# Patient Record
Sex: FEMALE | Race: WHITE | NOT HISPANIC OR LATINO | Employment: UNEMPLOYED | URBAN - METROPOLITAN AREA
[De-identification: names, ages, dates, MRNs, and addresses within clinical notes are randomized per-mention and may not be internally consistent; named-entity substitution may affect disease eponyms.]

---

## 2017-02-02 ENCOUNTER — TRANSCRIBE ORDERS (OUTPATIENT)
Dept: ADMINISTRATIVE | Facility: HOSPITAL | Age: 62
End: 2017-02-02

## 2017-02-02 DIAGNOSIS — Z01.812 ENCOUNTER FOR PRE-OPERATIVE LABORATORY TESTING: ICD-10-CM

## 2017-02-02 DIAGNOSIS — Z85.3 PERSONAL HISTORY OF MALIGNANT NEOPLASM OF BREAST: Primary | ICD-10-CM

## 2017-02-14 ENCOUNTER — APPOINTMENT (OUTPATIENT)
Dept: LAB | Facility: HOSPITAL | Age: 62
End: 2017-02-14
Payer: COMMERCIAL

## 2017-02-14 DIAGNOSIS — Z01.812 ENCOUNTER FOR PRE-OPERATIVE LABORATORY TESTING: ICD-10-CM

## 2017-02-14 LAB — VENIPUNCTURE: NORMAL

## 2017-02-14 PROCEDURE — 36415 COLL VENOUS BLD VENIPUNCTURE: CPT

## 2017-02-17 ENCOUNTER — HOSPITAL ENCOUNTER (OUTPATIENT)
Dept: RADIOLOGY | Facility: HOSPITAL | Age: 62
Discharge: HOME | End: 2017-02-17
Payer: COMMERCIAL

## 2017-02-17 DIAGNOSIS — Z85.3 PERSONAL HISTORY OF MALIGNANT NEOPLASM OF BREAST: ICD-10-CM

## 2017-02-17 PROCEDURE — C8908 MRI W/O FOL W/CONT, BREAST,: HCPCS

## 2017-02-17 PROCEDURE — 0159T HB CAD BREAST MRI: CPT

## 2017-02-17 PROCEDURE — A9585 GADOBUTROL INJECTION: HCPCS

## 2017-02-17 RX ADMIN — GADOBUTROL 9 ML: 604.72 INJECTION INTRAVENOUS at 11:17

## 2017-09-11 ENCOUNTER — TRANSCRIBE ORDERS (OUTPATIENT)
Dept: ADMINISTRATIVE | Facility: HOSPITAL | Age: 62
End: 2017-09-11

## 2017-09-11 DIAGNOSIS — R10.30 PAIN IN THE GROIN, UNSPECIFIED LATERALITY: Primary | ICD-10-CM

## 2017-09-15 ENCOUNTER — HOSPITAL ENCOUNTER (OUTPATIENT)
Dept: RADIOLOGY | Facility: HOSPITAL | Age: 62
Discharge: HOME | End: 2017-09-15
Payer: COMMERCIAL

## 2017-09-15 DIAGNOSIS — R10.30 PAIN IN THE GROIN, UNSPECIFIED LATERALITY: ICD-10-CM

## 2017-09-15 PROCEDURE — 72197 MRI PELVIS W/O & W/DYE: CPT

## 2017-09-15 PROCEDURE — A9585 GADOBUTROL INJECTION: HCPCS

## 2017-09-15 PROCEDURE — 74183 MRI ABD W/O CNTR FLWD CNTR: CPT

## 2017-09-15 RX ADMIN — GADOBUTROL 9 ML: 604.72 INJECTION INTRAVENOUS at 16:22

## 2017-12-15 ENCOUNTER — GENERIC CONVERSION - ENCOUNTER (OUTPATIENT)
Dept: OTHER | Facility: OTHER | Age: 62
End: 2017-12-15

## 2017-12-15 ENCOUNTER — HOSPITAL ENCOUNTER (OUTPATIENT)
Dept: RADIOLOGY | Facility: HOSPITAL | Age: 62
Discharge: HOME | End: 2017-12-15
Payer: COMMERCIAL

## 2017-12-15 ENCOUNTER — TRANSCRIBE ORDERS (OUTPATIENT)
Dept: ADMINISTRATIVE | Facility: HOSPITAL | Age: 62
End: 2017-12-15

## 2017-12-15 DIAGNOSIS — M47.817 FACET ARTHRITIS OF LUMBOSACRAL REGION: Primary | ICD-10-CM

## 2017-12-15 DIAGNOSIS — M47.817 FACET ARTHRITIS OF LUMBOSACRAL REGION: ICD-10-CM

## 2017-12-15 PROCEDURE — 72148 MRI LUMBAR SPINE W/O DYE: CPT

## 2018-01-11 ENCOUNTER — ALLSCRIPTS OFFICE VISIT (OUTPATIENT)
Dept: OTHER | Facility: OTHER | Age: 63
End: 2018-01-11

## 2018-01-11 DIAGNOSIS — M54.50 LOW BACK PAIN: ICD-10-CM

## 2018-01-12 NOTE — PROGRESS NOTES
Assessment    1  Patellar instability of right knee (741 09) (E83 110)    Plan   Chandra Rowe has a right knee with some arthritic change but more importantly patellar instability  I wrote her for more physical therapy as she is doing well with conservative management of this problem  I will see her back in 6 weeks  At this point, the patella appears to be stable but she needs to continue work on strengthening  She does have history of plantar fasciitis which is likely connected with the knee issue  Discussion/Summary  The patient was counseled regarding diagnostic results, instructions for management, prognosis, patient and family education, impressions, risks and benefits of treatment options, importance of compliance with treatment  Chief Complaint    1  Knee Injury    History of Present Illness   Chandra Rowe is a 72-year-old female who returns to see me today for her right knee pain  She unfortunately suffered a right knee patella dislocation about 6 weeks ago just prior to starting physical therapy  She was able to have her reduced although she was in pain overnight  Her pain was sharp and severe and constant but now is much more mild and dull and intermittent and she is certainly doing better with physical therapy  She would like to continue with physical therapy as well and she is having some weakness in the right lower extremity  She has had chemotherapy  An MRI demonstrated no signs of meniscal tear although there was an MCL sprain noted and some patellofemoral chondromalacia  Review of Systems    Constitutional: No fever, no chills, feels well, no tiredness, no recent weight gain or loss  Eyes: No complaints of eyesight problems, no red eyes  ENT: no loss of hearing, no nosebleeds, no sore throat  Cardiovascular: No complaints of chest pain, no palpitations, no leg claudication or lower extremity edema  Respiratory: no compliants of shortness of breath, no wheezing, no cough     Gastrointestinal: no complaints of abdominal pain, no constipation, no nausea or diarrhea, no vomiting, no bloody stools  Genitourinary: no complaints of dysuria, no incontinence  Musculoskeletal: as noted in HPI  Integumentary: no complaints of skin rash or lesion, no itching or dry skin, no skin wounds  Neurological: headache  Endocrine: No complaints of muscle weakness, no feelings of weakness, no frequent urination, no excessive thirst    Psychiatric: No suicidal thoughts, no anxiety, no feelings of depression  Active Problems    1  Abnormal findings on diagnostic imaging of urinary organs (793 5) (R93 4)   2  Arthralgia of right hip (719 45) (M25 551)   3  Bone Bruise (924 9) (T14 8)   4  History of Breast Cancer (V10 3)   5  Knee sprain (844 9) (S83 90XA)   6  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)   7  Pelvic mass (789 30) (R19 00)   8  Postoperative examination (V67 00) (Z09)   9  Right knee pain (719 46) (M25 561)   10  Urinary tract infection (599 0) (N39 0)    Past Medical History    · History of Breast Cancer (V10 3)   · History of Oral contraceptive prescribed (V25 01) (Z30 011)   · History of Summary Of Previous Pregnancies  1  (Total No )   · History of Summary Of Previous Pregnancies Para 0  (Deliveries)   · Denied: History of Taking Female Hormones For Postmenopausal HRT    The active problems and past medical history were reviewed and updated today  Surgical History    · History of Breast Surgery Mastectomy   · History of Chemotherapeutics (V87 41)   · History of Dilation And Curettage   · History of Surgically Induced  - By Dilation And Evacuation    The surgical history was reviewed and updated today         Family History    · Family history of Brain Cancer (V16 8)    · Family history of Lung Cancer (V16 1)   · Family history of Prostate Cancer (V16 42)    · Family history of Carcinoma Of The Uterus (V16 49)    · Family history of Skin Cancer (V16 8)    · Family history of Breast Cancer (V16 3)    · Family history of Lung Cancer (V16 1)    The family history was reviewed and updated today  Social History    · Denied: History of Alcohol Use (History)   · Denied: History of Drug Use   · Never A Smoker  The social history was reviewed and updated today  Current Meds   1  Acidophilus Oral Capsule; Therapy: (Recorded:13Nov2013) to Recorded   2  Calcium + D TABS; Therapy: (Recorded:13Nov2013) to Recorded   3  CoQ-10 CAPS; Therapy: (Recorded:13Nov2013) to Recorded   4  Flax OIL; Therapy: (Recorded:13Nov2013) to Recorded   5  Mag-Caps CAPS; Therapy: (Recorded:13Nov2013) to Recorded   6  Selenimin-200 TABS; Therapy: (Recorded:13Nov2013) to Recorded   7  Vitamin B-6 TABS; 275 mg; Therapy: (Recorded:13Nov2013) to Recorded   8  Vitamin C 500 MG Oral Capsule; 1500 mg; Therapy: (Recorded:13Nov2013) to Recorded   9  Vitamin D CAPS; 400iu with Vitamin A 500iu; Therapy: (Recorded:13Nov2013) to Recorded   10  Vitamin E TABS; Therapy: (Recorded:13Nov2013) to Recorded    The medication list was reviewed and updated today  Allergies    1  Shellfish-derived Products   2  Codeine Derivatives   3  Levaquin TABS   4  Percocet TABS    Physical Exam    Right Knee: Appearance: Normal  Tenderness: undersurface of the patella  ROM: Full  Motor: Normal  Special Test: positive patellar grind, but negative patellofemoral apprehension test, negative Anterior Drawer sign, no laxity on Valgus Stress and no laxity on Varus Stress  Constitutional - General appearance: Normal    Musculoskeletal - Gait and station: Normal  Digits and nails: Normal  Muscle strength/tone: Normal  Lower extremity compartments: Normal    Cardiovascular - Pulses: Normal  Examination of extremities for edema and/or varicosities: Normal    Lymphatic - Palpation of lymph nodes in other areas: Normal  no right femoral node enlargement     Skin - Skin and subcutaneous tissue: Normal    Neurologic - Sensation: Normal  Lower extremity peripheral neuro exam: Normal    Psychiatric - Orientation to person, place, and time: Normal  Mood and affect: Normal    Eyes   Conjunctiva and lids: Normal     Pupils and irises: Normal        Results/Data  I personally reviewed the films/images/results in the office today  My interpretation follows  MRI Review MRI of the right knee demonstrates patellofemoral chondromalacia and a mild MCL sprain but no meniscal tear or ligamentous tear        Signatures   Electronically signed by : KATELYN Lua ; Jan 20 2016  1:47PM EST                       (Author)

## 2018-01-24 VITALS — BODY MASS INDEX: 31.34 KG/M2 | WEIGHT: 195 LBS | HEIGHT: 66 IN

## 2018-02-15 ENCOUNTER — OFFICE VISIT (OUTPATIENT)
Dept: OBGYN CLINIC | Facility: CLINIC | Age: 63
End: 2018-02-15
Payer: COMMERCIAL

## 2018-02-15 DIAGNOSIS — G89.29 CHRONIC BILATERAL LOW BACK PAIN WITH RIGHT-SIDED SCIATICA: Primary | ICD-10-CM

## 2018-02-15 DIAGNOSIS — M54.41 CHRONIC BILATERAL LOW BACK PAIN WITH RIGHT-SIDED SCIATICA: Primary | ICD-10-CM

## 2018-02-15 DIAGNOSIS — M54.6 ACUTE MIDLINE THORACIC BACK PAIN: ICD-10-CM

## 2018-02-15 PROCEDURE — 99213 OFFICE O/P EST LOW 20 MIN: CPT | Performed by: ORTHOPAEDIC SURGERY

## 2018-02-15 RX ORDER — ERGOCALCIFEROL 1.25 MG/1
CAPSULE ORAL
COMMUNITY
End: 2021-10-27 | Stop reason: ALTCHOICE

## 2018-02-15 RX ORDER — MULTIVITAMIN WITH IRON
TABLET ORAL
COMMUNITY

## 2018-02-15 RX ORDER — SELENIUM 50 MCG
TABLET ORAL
COMMUNITY

## 2018-02-15 RX ORDER — CYANOCOBALAMIN (VITAMIN B-12) 500 MCG
LOZENGE ORAL
COMMUNITY

## 2018-02-15 RX ORDER — MULTIVIT-MIN/IRON/FOLIC ACID/K 18-600-40
CAPSULE ORAL
COMMUNITY

## 2018-02-15 RX ORDER — LANOLIN ALCOHOL/MO/W.PET/CERES
CREAM (GRAM) TOPICAL
COMMUNITY
End: 2021-10-27 | Stop reason: SDDI

## 2018-02-15 NOTE — PROGRESS NOTES
Assessment/Plan:  1  Chronic bilateral low back pain with right-sided sciatica  Ambulatory referral to Physical Therapy   2  Acute midline thoracic back pain  Ambulatory referral to Physical Therapy        Kadie Reed has continued low back pain and new development of thoracic back pain  I did offer her again to discuss with pain management possible steroid injection going forward  She does not want to proceed with this and does enjoy a the progression with physical therapy  She thinks she can continue to progress and strengthen her back as it seems to be working  We did give her an additional physical therapy script today  She will follow up as needed  Subjective:   Aneta Pascual is a 58 y o  female who presents   For follow-up for low back pain  She has been doing physical therapy for the last 12  Weeks and has been experiencing significant improvement in her low back pain  She has developed some midline thoracic back pain recently with increased activities and exercises but denies any recent trauma  She does have occasional right-sided radiculopathy but denies any worsening of this symptom  She denies any weakness or numbness in her right lower extremity  At last visit we did discuss referral to pain management for possible epidural an injection  She does not want to proceed with this and would like to continue physical therapy since it is helping so much  Review of Systems      No past medical history on file  No past surgical history on file  No family history on file  Social History     Occupational History    Not on file  Social History Main Topics    Smoking status: Not on file    Smokeless tobacco: Not on file    Alcohol use Not on file    Drug use: Unknown    Sexual activity: Not on file       No current outpatient prescriptions on file      Allergies   Allergen Reactions    Shellfish-Derived Products Anaphylaxis    Hydrocodone-Acetaminophen Vomiting    Levofloxacin Nausea Only and Vomiting       Objective: There were no vitals filed for this visit  Back Exam     Tenderness   The patient is experiencing tenderness in the thoracic and lumbar  Range of Motion   The patient has normal back ROM  Tests   Straight leg raise right: negative  Straight leg raise left: negative    Reflexes   Patellar: normal    Other   Toe Walk: normal  Heel Walk: normal  Sensation: normal  Gait: normal   Erythema: no back redness            Physical Exam   Constitutional: She is oriented to person, place, and time  She appears well-developed and well-nourished  HENT:   Head: Normocephalic and atraumatic  Eyes: Conjunctivae are normal    Neck: Neck supple  Cardiovascular: Intact distal pulses  Pulmonary/Chest: Effort normal    Neurological: She is alert and oriented to person, place, and time  Skin: Skin is warm and dry  Psychiatric: She has a normal mood and affect  Her behavior is normal    Vitals reviewed

## 2018-02-15 NOTE — PROGRESS NOTES
Assessment/Plan:  1  Chronic bilateral low back pain with right-sided sciatica  Ambulatory referral to Physical Therapy   2  Acute midline thoracic back pain  Ambulatory referral to Physical Therapy       ***    Subjective:   Patrick Bailey is a 58 y o  female who presents ***      Review of Systems      History reviewed  No pertinent past medical history  History reviewed  No pertinent surgical history  History reviewed  No pertinent family history  Social History     Occupational History    Not on file  Social History Main Topics    Smoking status: Not on file    Smokeless tobacco: Not on file    Alcohol use Not on file    Drug use: Unknown    Sexual activity: Not on file         Current Outpatient Prescriptions:     Ascorbic Acid (VITAMIN C) 500 MG CAPS, Take by mouth, Disp: , Rfl:     calcium citrate-vitamin D (CITRACAL+D) 315-200 MG-UNIT per tablet, Take by mouth, Disp: , Rfl:     Coenzyme Q10 (COQ-10) 10 MG CAPS, Take by mouth, Disp: , Rfl:     ergocalciferol (VITAMIN D2) 50,000 units, Take by mouth, Disp: , Rfl:     Flax OIL, Take by mouth, Disp: , Rfl:     lactobacillus acidophilus, Take by mouth, Disp: , Rfl:     Magnesium Oxide 140 MG CAPS, Take by mouth, Disp: , Rfl:     pyridoxine (VITAMIN B6) 100 mg tablet, Take by mouth, Disp: , Rfl:     selenium (SELENIMIN-200) 200 mcg, Take by mouth, Disp: , Rfl:     Vitamin E 400 units TABS, Take by mouth, Disp: , Rfl:     Allergies   Allergen Reactions    Shellfish-Derived Products Anaphylaxis    Hydrocodone-Acetaminophen Vomiting    Levofloxacin Nausea Only and Vomiting       Objective: There were no vitals filed for this visit      Ortho Exam    Physical Exam    I have personally reviewed pertinent films in PACS and my interpretation is as follows:  ***

## 2018-09-05 ENCOUNTER — TRANSCRIBE ORDERS (OUTPATIENT)
Dept: ADMINISTRATIVE | Facility: HOSPITAL | Age: 63
End: 2018-09-05

## 2018-09-12 ENCOUNTER — TRANSCRIBE ORDERS (OUTPATIENT)
Dept: ADMINISTRATIVE | Facility: HOSPITAL | Age: 63
End: 2018-09-12

## 2018-09-12 DIAGNOSIS — Z12.39 SCREENING BREAST EXAMINATION: Primary | ICD-10-CM

## 2018-09-21 ENCOUNTER — HOSPITAL ENCOUNTER (OUTPATIENT)
Dept: RADIOLOGY | Facility: HOSPITAL | Age: 63
Discharge: HOME | End: 2018-09-21
Payer: COMMERCIAL

## 2018-09-21 DIAGNOSIS — Z12.39 SCREENING BREAST EXAMINATION: ICD-10-CM

## 2018-09-21 PROCEDURE — 77063 BREAST TOMOSYNTHESIS BI: CPT

## 2018-09-21 PROCEDURE — 77067 SCR MAMMO BI INCL CAD: CPT

## 2018-12-18 ENCOUNTER — TRANSCRIBE ORDERS (OUTPATIENT)
Dept: ADMINISTRATIVE | Facility: HOSPITAL | Age: 63
End: 2018-12-18

## 2018-12-18 DIAGNOSIS — M53.82 WEAKNESS OF NECK: Primary | ICD-10-CM

## 2018-12-30 ENCOUNTER — HOSPITAL ENCOUNTER (OUTPATIENT)
Dept: RADIOLOGY | Facility: HOSPITAL | Age: 63
Discharge: HOME | End: 2018-12-30
Payer: COMMERCIAL

## 2018-12-30 DIAGNOSIS — M53.82 WEAKNESS OF NECK: ICD-10-CM

## 2018-12-30 PROCEDURE — 72146 MRI CHEST SPINE W/O DYE: CPT

## 2018-12-30 PROCEDURE — 72141 MRI NECK SPINE W/O DYE: CPT

## 2019-01-14 ENCOUNTER — APPOINTMENT (OUTPATIENT)
Dept: RADIOLOGY | Facility: CLINIC | Age: 64
End: 2019-01-14
Payer: COMMERCIAL

## 2019-01-14 ENCOUNTER — OFFICE VISIT (OUTPATIENT)
Dept: OBGYN CLINIC | Facility: CLINIC | Age: 64
End: 2019-01-14
Payer: COMMERCIAL

## 2019-01-14 VITALS
HEART RATE: 62 BPM | DIASTOLIC BLOOD PRESSURE: 97 MMHG | BODY MASS INDEX: 31.34 KG/M2 | SYSTOLIC BLOOD PRESSURE: 164 MMHG | HEIGHT: 66 IN | WEIGHT: 195 LBS

## 2019-01-14 DIAGNOSIS — M75.82 ROTATOR CUFF TENDINITIS, LEFT: Primary | ICD-10-CM

## 2019-01-14 DIAGNOSIS — M25.512 LEFT SHOULDER PAIN, UNSPECIFIED CHRONICITY: ICD-10-CM

## 2019-01-14 DIAGNOSIS — M75.52 SUBACROMIAL BURSITIS OF LEFT SHOULDER JOINT: ICD-10-CM

## 2019-01-14 PROCEDURE — 99214 OFFICE O/P EST MOD 30 MIN: CPT | Performed by: ORTHOPAEDIC SURGERY

## 2019-01-14 PROCEDURE — 73030 X-RAY EXAM OF SHOULDER: CPT

## 2019-01-14 NOTE — PROGRESS NOTES
Assessment/Plan:  1  Rotator cuff tendinitis, left  XR shoulder 2+ vw left    Ambulatory referral to Physical Therapy   2  Subacromial bursitis of left shoulder joint  Ambulatory referral to Physical Therapy       Elizabeth Blanco has symptoms consistent with rotator cuff tendinitis and subacromial bursitis  She does not have obvious gross weakness on exam to suggest rotator cuff tear  I would like for her to start with conservative measures of physical therapy at this time  She will decrease any overhead exercises at this time as well  I will see her back after physical therapy for repeat evaluation in 6 weeks  Subjective:   Dutch Beasley is a 61 y o  female who presents for evaluation for left-sided shoulder pain  She denies any specific injury or trauma  She has had discomfort in her left shoulder for about 1 month  She describes it as a intermittent sharp stabbing sensation over the anterior and lateral aspect of her left shoulder  Sometimes the pain radiates posteriorly  She is feeling some discomfort around the scapula as well in some intermittent numbness into that area  She recently had cervical and thoracic MRIs which did not show any significant abnormalities in those regions  She feels like this pain is more localized to her shoulder  It does significantly worsen with any overhead movement or any reaching behind her back  She denies any history of shoulder pain  She denies any numbness down her left arm  Review of Systems   Constitutional: Negative for chills, fever and unexpected weight change  HENT: Negative for hearing loss, nosebleeds and sore throat  Eyes: Negative for pain, redness and visual disturbance  Respiratory: Negative for cough, shortness of breath and wheezing  Cardiovascular: Negative for chest pain, palpitations and leg swelling  Gastrointestinal: Negative for abdominal pain, nausea and vomiting  Endocrine: Negative for polydipsia and polyuria     Genitourinary: Negative for dysuria and hematuria  Musculoskeletal:        See HPI   Skin: Negative for rash and wound  Neurological: Negative for dizziness, numbness and headaches  Psychiatric/Behavioral: Negative for decreased concentration and suicidal ideas  The patient is not nervous/anxious  No past medical history on file  Past Surgical History:   Procedure Laterality Date    HYSTERECTOMY      MASTECTOMY Right     TONSILLECTOMY      TUMOR REMOVAL      mult tumor removals from age of 15 on       Family History   Problem Relation Age of Onset   [employer] Brain cancer Mother    [employer] Lung cancer Father     Prostate cancer Father     Lung cancer Sister     Ovarian cancer Sister     No Known Problems Brother     No Known Problems Maternal Aunt     No Known Problems Maternal Uncle     No Known Problems Paternal Aunt     No Known Problems Paternal Uncle     No Known Problems Maternal Grandmother     No Known Problems Maternal Grandfather     No Known Problems Paternal Grandmother     No Known Problems Paternal Grandfather     ADD / ADHD Neg Hx     Anesthesia problems Neg Hx     Cancer Neg Hx     Clotting disorder Neg Hx     Collagen disease Neg Hx     Diabetes Neg Hx     Dislocations Neg Hx     Learning disabilities Neg Hx     Neurological problems Neg Hx     Osteoporosis Neg Hx     Rheumatologic disease Neg Hx     Scoliosis Neg Hx     Vascular Disease Neg Hx        Social History     Occupational History    Not on file       Social History Main Topics    Smoking status: Never Smoker    Smokeless tobacco: Never Used    Alcohol use No    Drug use: No    Sexual activity: Not on file         Current Outpatient Prescriptions:     Ascorbic Acid (VITAMIN C) 500 MG CAPS, Take by mouth, Disp: , Rfl:     calcium citrate-vitamin D (CITRACAL+D) 315-200 MG-UNIT per tablet, Take by mouth, Disp: , Rfl:     Coenzyme Q10 (COQ-10) 10 MG CAPS, Take by mouth, Disp: , Rfl:     ergocalciferol (VITAMIN D2) 50,000 units, Take by mouth, Disp: , Rfl:     Flax OIL, Take by mouth, Disp: , Rfl:     lactobacillus acidophilus, Take by mouth, Disp: , Rfl:     Magnesium Oxide 140 MG CAPS, Take by mouth, Disp: , Rfl:     pyridoxine (VITAMIN B6) 100 mg tablet, Take by mouth, Disp: , Rfl:     selenium (SELENIMIN-200) 200 mcg, Take by mouth, Disp: , Rfl:     Vitamin E 400 units TABS, Take by mouth, Disp: , Rfl:     Allergies   Allergen Reactions    Shellfish-Derived Products Anaphylaxis    Augmentin [Amoxicillin-Pot Clavulanate]     Hydrocodone-Acetaminophen Vomiting    Levofloxacin Nausea Only and Vomiting       Objective:  Vitals:    01/14/19 1318   BP: 164/97   Pulse: 62       Left Shoulder Exam     Tenderness   Left shoulder tenderness location: Tenderness to palpation over anterior shoulder along biceps and subacromial bursa  Significant tenderness to palpation over lateral left shoulder and humerus  Physical Exam   Constitutional: She is oriented to person, place, and time  She appears well-developed and well-nourished  HENT:   Head: Normocephalic and atraumatic  Eyes: Pupils are equal, round, and reactive to light  Conjunctivae are normal    Neck: Normal range of motion  Neck supple  Cardiovascular: Normal rate and intact distal pulses  Pulmonary/Chest: Effort normal  No respiratory distress  Musculoskeletal:   As noted in HPI   Neurological: She is alert and oriented to person, place, and time  Skin: Skin is warm and dry  Psychiatric: She has a normal mood and affect  Her behavior is normal    Vitals reviewed  I have personally reviewed pertinent films in PACS and my interpretation is as follows: Three-view x-rays of the left shoulder demonstrate no evidence of acute fracture or other significant abnormality

## 2019-02-25 ENCOUNTER — OFFICE VISIT (OUTPATIENT)
Dept: OBGYN CLINIC | Facility: CLINIC | Age: 64
End: 2019-02-25
Payer: COMMERCIAL

## 2019-02-25 VITALS
HEIGHT: 66 IN | WEIGHT: 195 LBS | HEART RATE: 69 BPM | BODY MASS INDEX: 31.34 KG/M2 | SYSTOLIC BLOOD PRESSURE: 154 MMHG | DIASTOLIC BLOOD PRESSURE: 88 MMHG

## 2019-02-25 DIAGNOSIS — M75.82 ROTATOR CUFF TENDINITIS, LEFT: ICD-10-CM

## 2019-02-25 DIAGNOSIS — M75.52 SUBACROMIAL BURSITIS OF LEFT SHOULDER JOINT: Primary | ICD-10-CM

## 2019-02-25 PROCEDURE — 99213 OFFICE O/P EST LOW 20 MIN: CPT | Performed by: ORTHOPAEDIC SURGERY

## 2019-02-25 NOTE — PROGRESS NOTES
Assessment/Plan:  1  Subacromial bursitis of left shoulder joint     2  Rotator cuff tendinitis, left         Ezequiel Arias is doing well regarding her left shoulder and subacromial bursitis  I did offer her cortisone injection today however she feels that she does not needed and would like to continue physical therapy  I do not think she has any weakness on examination and she can certainly continue with conservative measures  If she would have persistent discomfort we could consider an MRI or cortisone injection if needed  She will follow up as needed  Subjective:   Zack Partida is a 61 y o  female who presents for follow-up for left-sided subacromial bursitis and shoulder pain  She has been doing physical therapy for the last 6 weeks and states that the shoulder pain is improving  She does have several weeks left of therapy and feels that she is making adequate recovery and denies any new pain  She still has an intermittent discomfort over the anterior aspect of the shoulder with certain activities but overall feels much improved  She denies any weakness  Review of Systems   Constitutional: Negative for chills, fever and unexpected weight change  HENT: Negative for hearing loss, nosebleeds and sore throat  Eyes: Negative for pain, redness and visual disturbance  Respiratory: Negative for cough, shortness of breath and wheezing  Cardiovascular: Negative for chest pain, palpitations and leg swelling  Gastrointestinal: Negative for abdominal pain, nausea and vomiting  Endocrine: Negative for polydipsia and polyuria  Genitourinary: Negative for dysuria and hematuria  Musculoskeletal:        See HPI   Skin: Negative for rash and wound  Neurological: Negative for dizziness, numbness and headaches  Psychiatric/Behavioral: Negative for decreased concentration and suicidal ideas  The patient is not nervous/anxious  History reviewed  No pertinent past medical history      Past Surgical History:   Procedure Laterality Date    HYSTERECTOMY      MASTECTOMY Right     TONSILLECTOMY      TUMOR REMOVAL      mult tumor removals from age of 15 on       Family History   Problem Relation Age of Onset   [employer] Landry Brain cancer Mother    Regino Landry Lung cancer Father     Prostate cancer Father     Lung cancer Sister     Ovarian cancer Sister     No Known Problems Brother     No Known Problems Maternal Aunt     No Known Problems Maternal Uncle     No Known Problems Paternal Aunt     No Known Problems Paternal Uncle     No Known Problems Maternal Grandmother     No Known Problems Maternal Grandfather     No Known Problems Paternal Grandmother     No Known Problems Paternal Grandfather     ADD / ADHD Neg Hx     Anesthesia problems Neg Hx     Cancer Neg Hx     Clotting disorder Neg Hx     Collagen disease Neg Hx     Diabetes Neg Hx     Dislocations Neg Hx     Learning disabilities Neg Hx     Neurological problems Neg Hx     Osteoporosis Neg Hx     Rheumatologic disease Neg Hx     Scoliosis Neg Hx     Vascular Disease Neg Hx        Social History     Occupational History    Not on file   Tobacco Use    Smoking status: Never Smoker    Smokeless tobacco: Never Used   Substance and Sexual Activity    Alcohol use: No    Drug use: No    Sexual activity: Not on file         Current Outpatient Medications:     Ascorbic Acid (VITAMIN C) 500 MG CAPS, Take by mouth, Disp: , Rfl:     calcium citrate-vitamin D (CITRACAL+D) 315-200 MG-UNIT per tablet, Take by mouth, Disp: , Rfl:     Coenzyme Q10 (COQ-10) 10 MG CAPS, Take by mouth, Disp: , Rfl:     ergocalciferol (VITAMIN D2) 50,000 units, Take by mouth, Disp: , Rfl:     Flax OIL, Take by mouth, Disp: , Rfl:     lactobacillus acidophilus, Take by mouth, Disp: , Rfl:     Magnesium Oxide 140 MG CAPS, Take by mouth, Disp: , Rfl:     pyridoxine (VITAMIN B6) 100 mg tablet, Take by mouth, Disp: , Rfl:     selenium (SELENIMIN-200) 200 mcg, Take by mouth, Disp: , Rfl:     Vitamin E 400 units TABS, Take by mouth, Disp: , Rfl:     Allergies   Allergen Reactions    Shellfish-Derived Products Anaphylaxis    Augmentin [Amoxicillin-Pot Clavulanate]     Hydrocodone-Acetaminophen Vomiting    Levofloxacin Nausea Only and Vomiting       Objective:  Vitals:    02/25/19 1302   BP: 154/88   Pulse: 69       Left Shoulder Exam     Tenderness   Left shoulder tenderness location: Mild subacromial bursal tenderness posteriorly  Range of Motion   Active abduction: normal   Passive abduction: normal   Extension: normal   External rotation: normal   Forward flexion: normal   Internal rotation 0 degrees: normal     Muscle Strength   Abduction: 4/5   Internal rotation: 5/5   External rotation: 5/5   Supraspinatus: 5/5   Subscapularis: 5/5   Biceps: 5/5     Tests   Cerda test: negative  Impingement: negative  Drop arm: negative    Other   Erythema: absent  Sensation: normal  Pulse: present             Physical Exam   Constitutional: She is oriented to person, place, and time  She appears well-developed and well-nourished  HENT:   Head: Normocephalic and atraumatic  Eyes: Conjunctivae are normal    Cardiovascular: Intact distal pulses  Pulmonary/Chest: Effort normal  No respiratory distress  Neurological: She is alert and oriented to person, place, and time  Skin: Skin is warm and dry  Psychiatric: She has a normal mood and affect   Her behavior is normal

## 2019-08-26 ENCOUNTER — OFFICE VISIT (OUTPATIENT)
Dept: OBGYN CLINIC | Facility: CLINIC | Age: 64
End: 2019-08-26
Payer: COMMERCIAL

## 2019-08-26 ENCOUNTER — APPOINTMENT (OUTPATIENT)
Dept: RADIOLOGY | Facility: CLINIC | Age: 64
End: 2019-08-26
Payer: COMMERCIAL

## 2019-08-26 VITALS
WEIGHT: 195 LBS | DIASTOLIC BLOOD PRESSURE: 87 MMHG | BODY MASS INDEX: 31.34 KG/M2 | HEART RATE: 63 BPM | HEIGHT: 66 IN | SYSTOLIC BLOOD PRESSURE: 162 MMHG

## 2019-08-26 DIAGNOSIS — M79.671 RIGHT FOOT PAIN: Primary | ICD-10-CM

## 2019-08-26 DIAGNOSIS — M79.671 RIGHT FOOT PAIN: ICD-10-CM

## 2019-08-26 PROCEDURE — 99214 OFFICE O/P EST MOD 30 MIN: CPT | Performed by: ORTHOPAEDIC SURGERY

## 2019-08-26 PROCEDURE — 73630 X-RAY EXAM OF FOOT: CPT

## 2019-08-27 NOTE — PROGRESS NOTES
Assessment/Plan:  1  Right foot pain  XR foot 3+ vw right       Jolene Fiore does not appear to have a fracture on her x-ray today  She likely has a foot strain and can continue with all activities as tolerated  She will follow up if the pain persists  Subjective:   Marek Chino is a 59 y o  female who presents to the office for evaluation for right-sided foot pain  She states over the weekend she had increased pain over the midfoot in her right foot  She states that it started after she went up a ladder with very small platform  She may have strained something in her foot and is concerned that she possibly suffered a fracture at that time  She reports some swelling but denies any bruising, numbness or tingling  She denies any history of fracture in this area  Her pain is aching and moderate and worsens with movement and walking improves with rest       Review of Systems   Constitutional: Negative for chills, fever and unexpected weight change  HENT: Negative for hearing loss, nosebleeds and sore throat  Eyes: Negative for pain, redness and visual disturbance  Respiratory: Negative for cough, shortness of breath and wheezing  Cardiovascular: Negative for chest pain, palpitations and leg swelling  Gastrointestinal: Negative for abdominal pain, nausea and vomiting  Endocrine: Negative for polydipsia and polyuria  Genitourinary: Negative for dysuria and hematuria  Musculoskeletal:        See HPI   Skin: Negative for rash and wound  Neurological: Negative for dizziness, numbness and headaches  Psychiatric/Behavioral: Negative for decreased concentration and suicidal ideas  The patient is not nervous/anxious  History reviewed  No pertinent past medical history      Past Surgical History:   Procedure Laterality Date    HYSTERECTOMY      MASTECTOMY Right     TONSILLECTOMY      TUMOR REMOVAL      mult tumor removals from age of 15 on       Family History   Problem Relation Age of Onset  Brain cancer Mother     Lung cancer Father     Prostate cancer Father     Lung cancer Sister     Ovarian cancer Sister     No Known Problems Brother     No Known Problems Maternal Aunt     No Known Problems Maternal Uncle     No Known Problems Paternal Aunt     No Known Problems Paternal Uncle     No Known Problems Maternal Grandmother     No Known Problems Maternal Grandfather     No Known Problems Paternal Grandmother     No Known Problems Paternal Grandfather     ADD / ADHD Neg Hx     Anesthesia problems Neg Hx     Cancer Neg Hx     Clotting disorder Neg Hx     Collagen disease Neg Hx     Diabetes Neg Hx     Dislocations Neg Hx     Learning disabilities Neg Hx     Neurological problems Neg Hx     Osteoporosis Neg Hx     Rheumatologic disease Neg Hx     Scoliosis Neg Hx     Vascular Disease Neg Hx        Social History     Occupational History    Not on file   Tobacco Use    Smoking status: Never Smoker    Smokeless tobacco: Never Used   Substance and Sexual Activity    Alcohol use: No    Drug use: No    Sexual activity: Not on file         Current Outpatient Medications:     Ascorbic Acid (VITAMIN C) 500 MG CAPS, Take by mouth, Disp: , Rfl:     calcium citrate-vitamin D (CITRACAL+D) 315-200 MG-UNIT per tablet, Take by mouth, Disp: , Rfl:     Coenzyme Q10 (COQ-10) 10 MG CAPS, Take by mouth, Disp: , Rfl:     ergocalciferol (VITAMIN D2) 50,000 units, Take by mouth, Disp: , Rfl:     Flax OIL, Take by mouth, Disp: , Rfl:     lactobacillus acidophilus, Take by mouth, Disp: , Rfl:     Magnesium Oxide 140 MG CAPS, Take by mouth, Disp: , Rfl:     pyridoxine (VITAMIN B6) 100 mg tablet, Take by mouth, Disp: , Rfl:     selenium (SELENIMIN-200) 200 mcg, Take by mouth, Disp: , Rfl:     Vitamin E 400 units TABS, Take by mouth, Disp: , Rfl:     Allergies   Allergen Reactions    Shellfish-Derived Products Anaphylaxis    Augmentin [Amoxicillin-Pot Clavulanate]     Hydrocodone-Acetaminophen Vomiting    Levofloxacin Nausea Only and Vomiting       Objective:  Vitals:    08/26/19 1259   BP: 162/87   Pulse: 63       Ortho Exam    Physical Exam   Constitutional: She is oriented to person, place, and time  She appears well-developed and well-nourished  HENT:   Head: Normocephalic and atraumatic  Eyes: Pupils are equal, round, and reactive to light  Conjunctivae are normal    Neck: Normal range of motion  Neck supple  Cardiovascular: Normal rate and intact distal pulses  Pulmonary/Chest: Effort normal  No respiratory distress  Musculoskeletal:        Right foot: There is tenderness  Feet:    As noted in HPI   Neurological: She is alert and oriented to person, place, and time  Skin: Skin is warm and dry  Psychiatric: She has a normal mood and affect  Her behavior is normal    Vitals reviewed  I have personally reviewed pertinent films in PACS and my interpretation is as follows: Three-view x-rays of the right foot in the office today demonstrates no evidence of acute fracture

## 2019-10-10 ENCOUNTER — TRANSCRIBE ORDERS (OUTPATIENT)
Dept: ADMINISTRATIVE | Facility: HOSPITAL | Age: 64
End: 2019-10-10

## 2019-10-10 DIAGNOSIS — Z12.31 SCREENING MAMMOGRAM FOR HIGH-RISK PATIENT: Primary | ICD-10-CM

## 2019-11-07 ENCOUNTER — HOSPITAL ENCOUNTER (OUTPATIENT)
Dept: RADIOLOGY | Facility: HOSPITAL | Age: 64
Discharge: HOME | End: 2019-11-07
Attending: FAMILY MEDICINE
Payer: COMMERCIAL

## 2019-11-07 VITALS — BODY MASS INDEX: 31.34 KG/M2 | WEIGHT: 195 LBS | HEIGHT: 66 IN

## 2019-11-07 DIAGNOSIS — Z12.31 SCREENING MAMMOGRAM FOR HIGH-RISK PATIENT: ICD-10-CM

## 2019-11-07 PROCEDURE — 77063 BREAST TOMOSYNTHESIS BI: CPT

## 2019-11-07 PROCEDURE — 77067 SCR MAMMO BI INCL CAD: CPT

## 2019-11-18 ENCOUNTER — TRANSCRIBE ORDERS (OUTPATIENT)
Dept: ADMINISTRATIVE | Facility: HOSPITAL | Age: 64
End: 2019-11-18

## 2020-05-27 ENCOUNTER — TELEPHONE (OUTPATIENT)
Dept: OBGYN CLINIC | Facility: CLINIC | Age: 65
End: 2020-05-27

## 2020-05-28 ENCOUNTER — TELEMEDICINE (OUTPATIENT)
Dept: OBGYN CLINIC | Facility: CLINIC | Age: 65
End: 2020-05-28
Payer: COMMERCIAL

## 2020-05-28 DIAGNOSIS — M25.562 ACUTE PAIN OF LEFT KNEE: Primary | ICD-10-CM

## 2020-05-28 PROCEDURE — 99213 OFFICE O/P EST LOW 20 MIN: CPT | Performed by: ORTHOPAEDIC SURGERY

## 2020-06-07 ENCOUNTER — TELEPHONE (OUTPATIENT)
Dept: OTHER | Facility: OTHER | Age: 65
End: 2020-06-07

## 2020-06-08 ENCOUNTER — TELEPHONE (OUTPATIENT)
Dept: OBGYN CLINIC | Facility: CLINIC | Age: 65
End: 2020-06-08

## 2020-06-08 NOTE — TELEPHONE ENCOUNTER
Patient sees Dr Tino Medeiros    Patient called because she wants to talk to you about the pain in her bad leg after re-injuring it    She has a physical therapy appointment today at 6 and wants to know if the therapist could call to Dr Tino Medeiros to come up with a treatment plan       [phone]

## 2020-06-08 NOTE — TELEPHONE ENCOUNTER
Dr Granger Prom called because she had called the service over the weekend and wants to talk to you about the pain in her bad leg after re-injuring it  She has a physical therapy appointment today at 6 and wants to know if the therapist could call you while she is there to review what she finds to determine if she would need an mri  Jonathan Suazo also needs to limit her exposure due to her  being ill  Please call her [phone] if this would be possible

## 2020-06-09 ENCOUNTER — OFFICE VISIT (OUTPATIENT)
Dept: OBGYN CLINIC | Facility: CLINIC | Age: 65
End: 2020-06-09
Payer: COMMERCIAL

## 2020-06-09 ENCOUNTER — APPOINTMENT (OUTPATIENT)
Dept: RADIOLOGY | Facility: CLINIC | Age: 65
End: 2020-06-09
Payer: COMMERCIAL

## 2020-06-09 VITALS — BODY MASS INDEX: 31.34 KG/M2 | HEIGHT: 66 IN | TEMPERATURE: 97.9 F | WEIGHT: 195 LBS

## 2020-06-09 DIAGNOSIS — M25.562 ACUTE PAIN OF LEFT KNEE: Primary | ICD-10-CM

## 2020-06-09 DIAGNOSIS — M25.562 LEFT KNEE PAIN, UNSPECIFIED CHRONICITY: ICD-10-CM

## 2020-06-09 PROCEDURE — 73562 X-RAY EXAM OF KNEE 3: CPT

## 2020-06-09 PROCEDURE — 99213 OFFICE O/P EST LOW 20 MIN: CPT | Performed by: ORTHOPAEDIC SURGERY

## 2020-06-10 ENCOUNTER — TELEPHONE (OUTPATIENT)
Dept: OBGYN CLINIC | Facility: HOSPITAL | Age: 65
End: 2020-06-10

## 2020-06-10 NOTE — TELEPHONE ENCOUNTER
Patient sees Dr Rivas Ours    Patient called regarding her MRI date, she wants to see if she can come in sooner  I transferred her to [employer], she will cb to set up new follow up appt  Original appt left as is until she finds out if she can change the date  Patient said her insurance does not require pre-auth

## 2020-06-16 ENCOUNTER — OFFICE VISIT (OUTPATIENT)
Dept: OBGYN CLINIC | Facility: CLINIC | Age: 65
End: 2020-06-16
Payer: COMMERCIAL

## 2020-06-16 VITALS
WEIGHT: 195 LBS | HEIGHT: 66 IN | HEART RATE: 68 BPM | TEMPERATURE: 98.1 F | BODY MASS INDEX: 31.34 KG/M2 | DIASTOLIC BLOOD PRESSURE: 92 MMHG | SYSTOLIC BLOOD PRESSURE: 155 MMHG

## 2020-06-16 DIAGNOSIS — M22.42 CHONDROMALACIA, PATELLA, LEFT: ICD-10-CM

## 2020-06-16 DIAGNOSIS — M66.0 BAKER'S CYST, RUPTURED: Primary | ICD-10-CM

## 2020-06-16 PROCEDURE — 99213 OFFICE O/P EST LOW 20 MIN: CPT | Performed by: ORTHOPAEDIC SURGERY

## 2020-10-26 ENCOUNTER — TRANSCRIBE ORDERS (OUTPATIENT)
Dept: ADMINISTRATIVE | Facility: HOSPITAL | Age: 65
End: 2020-10-26

## 2020-10-26 DIAGNOSIS — I80.9 PHLEBITIS: Primary | ICD-10-CM

## 2020-10-26 DIAGNOSIS — I80.9 PHLEBITIS AND THROMBOPHLEBITIS OF UNSPECIFIED SITE: ICD-10-CM

## 2020-10-28 ENCOUNTER — HOSPITAL ENCOUNTER (OUTPATIENT)
Dept: RADIOLOGY | Facility: HOSPITAL | Age: 65
Discharge: HOME | End: 2020-10-28
Attending: FAMILY MEDICINE
Payer: MEDICARE

## 2020-10-28 DIAGNOSIS — I80.9 PHLEBITIS AND THROMBOPHLEBITIS OF UNSPECIFIED SITE: ICD-10-CM

## 2020-10-28 PROCEDURE — 93970 EXTREMITY STUDY: CPT

## 2020-10-29 ENCOUNTER — TELEPHONE (OUTPATIENT)
Dept: VASCULAR SURGERY | Facility: CLINIC | Age: 65
End: 2020-10-29

## 2020-10-29 PROCEDURE — 93970 EXTREMITY STUDY: CPT | Performed by: SURGERY

## 2020-10-30 ENCOUNTER — CONSULT (OUTPATIENT)
Dept: VASCULAR SURGERY | Facility: CLINIC | Age: 65
End: 2020-10-30
Payer: MEDICARE

## 2020-10-30 VITALS
SYSTOLIC BLOOD PRESSURE: 148 MMHG | RESPIRATION RATE: 18 BRPM | HEIGHT: 66 IN | BODY MASS INDEX: 32.62 KG/M2 | WEIGHT: 203 LBS | DIASTOLIC BLOOD PRESSURE: 88 MMHG | HEART RATE: 69 BPM | TEMPERATURE: 97.8 F

## 2020-10-30 DIAGNOSIS — I83.893 VARICOSE VEINS OF BOTH LEGS WITH EDEMA: Primary | ICD-10-CM

## 2020-10-30 PROCEDURE — 99204 OFFICE O/P NEW MOD 45 MIN: CPT | Performed by: PHYSICIAN ASSISTANT

## 2020-10-30 RX ORDER — ACETAMINOPHEN 325 MG/1
650 TABLET ORAL EVERY 6 HOURS PRN
COMMUNITY

## 2020-11-12 ENCOUNTER — HOSPITAL ENCOUNTER (OUTPATIENT)
Dept: RADIOLOGY | Facility: HOSPITAL | Age: 65
Discharge: HOME | End: 2020-11-12
Payer: MEDICARE

## 2020-11-12 DIAGNOSIS — I83.893 VARICOSE VEINS OF BOTH LEGS WITH EDEMA: ICD-10-CM

## 2020-11-12 PROCEDURE — 93970 EXTREMITY STUDY: CPT

## 2020-11-12 PROCEDURE — 93970 EXTREMITY STUDY: CPT | Performed by: SURGERY

## 2021-01-31 ENCOUNTER — HOSPITAL ENCOUNTER (EMERGENCY)
Facility: HOSPITAL | Age: 66
Discharge: HOME | End: 2021-01-31
Attending: EMERGENCY MEDICINE
Payer: MEDICARE

## 2021-01-31 ENCOUNTER — APPOINTMENT (EMERGENCY)
Dept: RADIOLOGY | Facility: HOSPITAL | Age: 66
End: 2021-01-31
Payer: MEDICARE

## 2021-01-31 VITALS
DIASTOLIC BLOOD PRESSURE: 104 MMHG | WEIGHT: 198 LBS | BODY MASS INDEX: 31.96 KG/M2 | TEMPERATURE: 97.2 F | SYSTOLIC BLOOD PRESSURE: 205 MMHG | OXYGEN SATURATION: 99 % | HEART RATE: 68 BPM | RESPIRATION RATE: 22 BRPM

## 2021-01-31 DIAGNOSIS — I49.3 PVC (PREMATURE VENTRICULAR CONTRACTION): ICD-10-CM

## 2021-01-31 DIAGNOSIS — R00.2 HEART PALPITATIONS: ICD-10-CM

## 2021-01-31 DIAGNOSIS — R07.9 CHEST PAIN: Primary | ICD-10-CM

## 2021-01-31 LAB
ALBUMIN SERPL BCP-MCNC: 3.4 G/DL (ref 3.5–5)
ALP SERPL-CCNC: 79 U/L (ref 46–116)
ALT SERPL W P-5'-P-CCNC: 24 U/L (ref 12–78)
ANION GAP SERPL CALCULATED.3IONS-SCNC: 7 MMOL/L (ref 4–13)
APTT PPP: 31 SECONDS (ref 23–37)
AST SERPL W P-5'-P-CCNC: 16 U/L (ref 5–45)
BASOPHILS # BLD AUTO: 0.02 THOUSANDS/ΜL (ref 0–0.1)
BASOPHILS NFR BLD AUTO: 0 % (ref 0–1)
BILIRUB SERPL-MCNC: 0.4 MG/DL (ref 0.2–1)
BUN SERPL-MCNC: 13 MG/DL (ref 5–25)
CALCIUM ALBUM COR SERPL-MCNC: 9.2 MG/DL (ref 8.3–10.1)
CALCIUM SERPL-MCNC: 8.7 MG/DL (ref 8.3–10.1)
CHLORIDE SERPL-SCNC: 104 MMOL/L (ref 100–108)
CO2 SERPL-SCNC: 29 MMOL/L (ref 21–32)
CREAT SERPL-MCNC: 0.79 MG/DL (ref 0.6–1.3)
D DIMER PPP FEU-MCNC: 1.2 UG/ML FEU
EOSINOPHIL # BLD AUTO: 0.04 THOUSAND/ΜL (ref 0–0.61)
EOSINOPHIL NFR BLD AUTO: 1 % (ref 0–6)
ERYTHROCYTE [DISTWIDTH] IN BLOOD BY AUTOMATED COUNT: 12.9 % (ref 11.6–15.1)
GFR SERPL CREATININE-BSD FRML MDRD: 79 ML/MIN/1.73SQ M
GLUCOSE SERPL-MCNC: 89 MG/DL (ref 65–140)
HCT VFR BLD AUTO: 42.8 % (ref 34.8–46.1)
HGB BLD-MCNC: 13.4 G/DL (ref 11.5–15.4)
IMM GRANULOCYTES # BLD AUTO: 0.02 THOUSAND/UL (ref 0–0.2)
IMM GRANULOCYTES NFR BLD AUTO: 0 % (ref 0–2)
INR PPP: 0.91 (ref 0.84–1.19)
LIPASE SERPL-CCNC: 47 U/L (ref 73–393)
LYMPHOCYTES # BLD AUTO: 1.56 THOUSANDS/ΜL (ref 0.6–4.47)
LYMPHOCYTES NFR BLD AUTO: 25 % (ref 14–44)
MAGNESIUM SERPL-MCNC: 2 MG/DL (ref 1.6–2.6)
MCH RBC QN AUTO: 29.5 PG (ref 26.8–34.3)
MCHC RBC AUTO-ENTMCNC: 31.3 G/DL (ref 31.4–37.4)
MCV RBC AUTO: 94 FL (ref 82–98)
MONOCYTES # BLD AUTO: 0.4 THOUSAND/ΜL (ref 0.17–1.22)
MONOCYTES NFR BLD AUTO: 6 % (ref 4–12)
NEUTROPHILS # BLD AUTO: 4.18 THOUSANDS/ΜL (ref 1.85–7.62)
NEUTS SEG NFR BLD AUTO: 68 % (ref 43–75)
NRBC BLD AUTO-RTO: 0 /100 WBCS
PLATELET # BLD AUTO: 204 THOUSANDS/UL (ref 149–390)
PMV BLD AUTO: 10.6 FL (ref 8.9–12.7)
POTASSIUM SERPL-SCNC: 3.9 MMOL/L (ref 3.5–5.3)
PROT SERPL-MCNC: 7.6 G/DL (ref 6.4–8.2)
PROTHROMBIN TIME: 12.2 SECONDS (ref 11.6–14.5)
RBC # BLD AUTO: 4.55 MILLION/UL (ref 3.81–5.12)
SODIUM SERPL-SCNC: 140 MMOL/L (ref 136–145)
TROPONIN I SERPL-MCNC: <0.02 NG/ML
TROPONIN I SERPL-MCNC: <0.02 NG/ML
TSH SERPL DL<=0.05 MIU/L-ACNC: 6.41 UIU/ML (ref 0.36–3.74)
WBC # BLD AUTO: 6.22 THOUSAND/UL (ref 4.31–10.16)

## 2021-01-31 PROCEDURE — 85379 FIBRIN DEGRADATION QUANT: CPT | Performed by: EMERGENCY MEDICINE

## 2021-01-31 PROCEDURE — 36415 COLL VENOUS BLD VENIPUNCTURE: CPT | Performed by: EMERGENCY MEDICINE

## 2021-01-31 PROCEDURE — 85025 COMPLETE CBC W/AUTO DIFF WBC: CPT | Performed by: EMERGENCY MEDICINE

## 2021-01-31 PROCEDURE — 71045 X-RAY EXAM CHEST 1 VIEW: CPT

## 2021-01-31 PROCEDURE — 83735 ASSAY OF MAGNESIUM: CPT | Performed by: EMERGENCY MEDICINE

## 2021-01-31 PROCEDURE — 99285 EMERGENCY DEPT VISIT HI MDM: CPT | Performed by: EMERGENCY MEDICINE

## 2021-01-31 PROCEDURE — 71275 CT ANGIOGRAPHY CHEST: CPT

## 2021-01-31 PROCEDURE — 84484 ASSAY OF TROPONIN QUANT: CPT | Performed by: EMERGENCY MEDICINE

## 2021-01-31 PROCEDURE — 96360 HYDRATION IV INFUSION INIT: CPT

## 2021-01-31 PROCEDURE — 99285 EMERGENCY DEPT VISIT HI MDM: CPT

## 2021-01-31 PROCEDURE — 84443 ASSAY THYROID STIM HORMONE: CPT | Performed by: EMERGENCY MEDICINE

## 2021-01-31 PROCEDURE — 93005 ELECTROCARDIOGRAM TRACING: CPT

## 2021-01-31 PROCEDURE — 85730 THROMBOPLASTIN TIME PARTIAL: CPT | Performed by: EMERGENCY MEDICINE

## 2021-01-31 PROCEDURE — 83690 ASSAY OF LIPASE: CPT | Performed by: EMERGENCY MEDICINE

## 2021-01-31 PROCEDURE — G1004 CDSM NDSC: HCPCS

## 2021-01-31 PROCEDURE — 85610 PROTHROMBIN TIME: CPT | Performed by: EMERGENCY MEDICINE

## 2021-01-31 PROCEDURE — 80053 COMPREHEN METABOLIC PANEL: CPT | Performed by: EMERGENCY MEDICINE

## 2021-01-31 RX ADMIN — SODIUM CHLORIDE 1000 ML: 0.9 INJECTION, SOLUTION INTRAVENOUS at 15:04

## 2021-01-31 RX ADMIN — IOHEXOL 100 ML: 350 INJECTION, SOLUTION INTRAVENOUS at 14:55

## 2021-01-31 NOTE — ED PROVIDER NOTES
History  Chief Complaint   Patient presents with    Palpitations     States started 4 hours ago with palpitations and chest pressure   Denies SOB, nausea felt " woozy "    Chest Pain     Patient presents for evaluation of palpitations and substernal chest pressure  Patient states she initially felt that when she laid down the go bed last night but resolved  Started again this morning  Has never had these symptoms before  Denies any shortness of breath  Did states she fell nausea at 1 point felt a little woozy symptom resolved  Patient states she has been exercising regularly for last 6 months actually did 30 minutes on the treadmill yesterday without any chest pain palpitations or shortness of breath  Patient denies any medical history other and breast cancer she also has some macular degeneration states vitamins but no other medications  She did start taking Zyrtec last few days but without the D  States her brother has a history PVCs  History provided by:  Patient   used: No    Palpitations  Associated symptoms: chest pain and nausea    Associated symptoms: no back pain, no cough, no dizziness, no numbness, no shortness of breath, no vomiting and no weakness    Chest Pain  Associated symptoms: nausea and palpitations    Associated symptoms: no abdominal pain, no back pain, no cough, no dizziness, no fever, no headache, no numbness, no shortness of breath, not vomiting and no weakness        Prior to Admission Medications   Prescriptions Last Dose Informant Patient Reported? Taking?    Ascorbic Acid (VITAMIN C) 500 MG CAPS  Self Yes No   Sig: Take by mouth   Coenzyme Q10 (COQ-10) 10 MG CAPS  Self Yes No   Sig: Take by mouth   Cyanocobalamin (VITAMIN B 12 PO)  Self Yes No   Sig: Take by mouth   Flax OIL  Self Yes No   Sig: Take by mouth   Iron Combinations (IRON COMPLEX PO)  Self Yes No   Sig: Take by mouth   Magnesium Oxide 140 MG CAPS  Self Yes No   Sig: Take by mouth   Vitamin E 400 units TABS  Self Yes No   Sig: Take by mouth   acetaminophen (TYLENOL) 325 mg tablet  Self Yes No   Sig: Take 650 mg by mouth every 6 (six) hours as needed for mild pain   calcium citrate-vitamin D (CITRACAL+D) 315-200 MG-UNIT per tablet  Self Yes No   Sig: Take by mouth   ergocalciferol (VITAMIN D2) 50,000 units  Self Yes No   Sig: Take by mouth   lactobacillus acidophilus  Self Yes No   Sig: Take by mouth   pyridoxine (VITAMIN B6) 100 mg tablet  Self Yes No   Sig: Take by mouth   selenium (SELENIMIN-200) 200 mcg  Self Yes No   Sig: Take by mouth      Facility-Administered Medications: None       Past Medical History:   Diagnosis Date    BRCA1 negative     BRCA2 negative     Breast cancer (HCC)     History of chemotherapy        Past Surgical History:   Procedure Laterality Date    HYSTERECTOMY      MASTECTOMY Right     1995    MASTECTOMY Right     OOPHORECTOMY      TONSILLECTOMY      TUMOR REMOVAL      mult tumor removals from age of 15 on       Family History   Problem Relation Age of Onset    Brain cancer Mother     Lung cancer Father     Prostate cancer Father     Lung cancer Sister     Ovarian cancer Sister     No Known Problems Brother     No Known Problems Maternal Aunt     No Known Problems Maternal Uncle     No Known Problems Paternal Aunt     No Known Problems Paternal Uncle     No Known Problems Maternal Grandmother     No Known Problems Maternal Grandfather     No Known Problems Paternal Grandmother     No Known Problems Paternal Grandfather     ADD / ADHD Neg Hx     Anesthesia problems Neg Hx     Cancer Neg Hx     Clotting disorder Neg Hx     Collagen disease Neg Hx     Diabetes Neg Hx     Dislocations Neg Hx     Learning disabilities Neg Hx     Neurological problems Neg Hx     Osteoporosis Neg Hx     Rheumatologic disease Neg Hx     Scoliosis Neg Hx     Vascular Disease Neg Hx      I have reviewed and agree with the history as documented      E-Cigarette/Vaping  E-Cigarette Use Never User      E-Cigarette/Vaping Substances     Social History     Tobacco Use    Smoking status: Never Smoker    Smokeless tobacco: Never Used   Substance Use Topics    Alcohol use: No    Drug use: No       Review of Systems   Constitutional: Negative for chills and fever  HENT: Negative for congestion and sore throat  Respiratory: Negative for cough and shortness of breath  Cardiovascular: Positive for chest pain and palpitations  Negative for leg swelling  Gastrointestinal: Positive for nausea  Negative for abdominal pain and vomiting  Genitourinary: Negative for difficulty urinating and dysuria  Musculoskeletal: Negative for back pain and neck pain  Skin: Negative for rash  Neurological: Positive for light-headedness  Negative for dizziness, weakness, numbness and headaches  All other systems reviewed and are negative  Physical Exam  Physical Exam  Vitals signs and nursing note reviewed  Constitutional:       General: She is not in acute distress  HENT:      Head: Atraumatic  Mouth/Throat:      Mouth: Mucous membranes are moist       Pharynx: Oropharynx is clear  No oropharyngeal exudate  Eyes:      Extraocular Movements: Extraocular movements intact  Pupils: Pupils are equal, round, and reactive to light  Neck:      Musculoskeletal: Normal range of motion and neck supple  Cardiovascular:      Rate and Rhythm: Normal rate  Rhythm irregular  Pulses: Normal pulses  Comments: Slightly irregular heartbeat with some extra beats correlates with PVC seen on cardiac monitor  Pulmonary:      Effort: Pulmonary effort is normal  No respiratory distress  Breath sounds: Normal breath sounds  No wheezing, rhonchi or rales  Abdominal:      General: Abdomen is flat  Bowel sounds are normal  There is no distension  Palpations: Abdomen is soft  Tenderness: There is no abdominal tenderness  There is no guarding or rebound  Musculoskeletal: Normal range of motion  Right lower leg: No edema  Left lower leg: No edema  Skin:     Capillary Refill: Capillary refill takes less than 2 seconds  Findings: No rash  Neurological:      General: No focal deficit present  Mental Status: She is alert and oriented to person, place, and time  Cranial Nerves: No cranial nerve deficit  Sensory: No sensory deficit  Motor: No weakness  Vital Signs  ED Triage Vitals [01/31/21 1217]   Temperature Pulse Respirations Blood Pressure SpO2   (!) 97 2 °F (36 2 °C) 67 20 (!) 172/104 99 %      Temp Source Heart Rate Source Patient Position - Orthostatic VS BP Location FiO2 (%)   Tympanic Monitor Lying Left arm --      Pain Score       5           Vitals:    01/31/21 1515 01/31/21 1530 01/31/21 1545 01/31/21 1600   BP: (!) 200/94  (!) 205/104    Pulse: 74 74 68 68   Patient Position - Orthostatic VS:             Visual Acuity      ED Medications  Medications   sodium chloride 0 9 % bolus 1,000 mL (0 mL Intravenous Stopped 1/31/21 1602)   iohexol (OMNIPAQUE) 350 MG/ML injection (MULTI-DOSE) 100 mL (100 mL Intravenous Given 1/31/21 1455)       Diagnostic Studies  Results Reviewed     Procedure Component Value Units Date/Time    Troponin I [[id number]]  (Normal) Collected: 01/31/21 1513    Lab Status: Final result Specimen: Blood from Arm, Left Updated: 01/31/21 1538     Troponin I <0 02 ng/mL     TSH [[id number]]  (Abnormal) Collected: 01/31/21 1252    Lab Status: Final result Specimen: Blood from Arm, Left Updated: 01/31/21 1325     TSH 3RD GENERATON 6 410 uIU/mL     Narrative:      Patients undergoing fluorescein dye angiography may retain small amounts of fluorescein in the body for 48-72 hours post procedure  Samples containing fluorescein can produce falsely depressed TSH values  If the patient had this procedure,a specimen should be resubmitted post fluorescein clearance        Magnesium [[id number]]  (Normal) Collected: 01/31/21 1252    Lab Status: Final result Specimen: Blood from Arm, Left Updated: 01/31/21 1325     Magnesium 2 0 mg/dL     Lipase [[id number]]  (Abnormal) Collected: 01/31/21 1252    Lab Status: Final result Specimen: Blood from Arm, Left Updated: 01/31/21 1325     Lipase 47 u/L     D-Dimer [[id number]]  (Abnormal) Collected: 01/31/21 1252    Lab Status: Final result Specimen: Blood from Arm, Left Updated: 01/31/21 1325     D-Dimer, Quant 1 20 ug/ml FEU     Troponin I [[id number]]  (Normal) Collected: 01/31/21 1252    Lab Status: Final result Specimen: Blood from Arm, Left Updated: 01/31/21 1321     Troponin I <0 02 ng/mL     Comprehensive metabolic panel [[id number]]  (Abnormal) Collected: 01/31/21 1252    Lab Status: Final result Specimen: Blood from Arm, Left Updated: 01/31/21 1316     Sodium 140 mmol/L      Potassium 3 9 mmol/L      Chloride 104 mmol/L      CO2 29 mmol/L      ANION GAP 7 mmol/L      BUN 13 mg/dL      Creatinine 0 79 mg/dL      Glucose 89 mg/dL      Calcium 8 7 mg/dL      Corrected Calcium 9 2 mg/dL      AST 16 U/L      ALT 24 U/L      Alkaline Phosphatase 79 U/L      Total Protein 7 6 g/dL      Albumin 3 4 g/dL      Total Bilirubin 0 40 mg/dL      eGFR 79 ml/min/1 73sq m     Narrative:      [place] guidelines for Chronic Kidney Disease (CKD):     Stage 1 with normal or high GFR (GFR > 90 mL/min/1 73 square meters)    Stage 2 Mild CKD (GFR = 60-89 mL/min/1 73 square meters)    Stage 3A Moderate CKD (GFR = 45-59 mL/min/1 73 square meters)    Stage 3B Moderate CKD (GFR = 30-44 mL/min/1 73 square meters)    Stage 4 Severe CKD (GFR = 15-29 mL/min/1 73 square meters)    Stage 5 End Stage CKD (GFR <15 mL/min/1 73 square meters)  Note: GFR calculation is accurate only with a steady state creatinine    Protime-INR [[id number]]  (Normal) Collected: 01/31/21 1252    Lab Status: Final result Specimen: Blood from Arm, Left Updated: 01/31/21 1313     Protime 12 2 seconds      INR 0 91    APTT [[id number]]  (Normal) Collected: 01/31/21 1252    Lab Status: Final result Specimen: Blood from Arm, Left Updated: 01/31/21 1313     PTT 31 seconds     CBC and differential [[id number]]  (Abnormal) Collected: 01/31/21 1252    Lab Status: Final result Specimen: Blood from Arm, Left Updated: 01/31/21 1300     WBC 6 22 Thousand/uL      RBC 4 55 Million/uL      Hemoglobin 13 4 g/dL      Hematocrit 42 8 %      MCV 94 fL      MCH 29 5 pg      MCHC 31 3 g/dL      RDW 12 9 %      MPV 10 6 fL      Platelets 694 Thousands/uL      nRBC 0 /100 WBCs      Neutrophils Relative 68 %      Immat GRANS % 0 %      Lymphocytes Relative 25 %      Monocytes Relative 6 %      Eosinophils Relative 1 %      Basophils Relative 0 %      Neutrophils Absolute 4 18 Thousands/µL      Immature Grans Absolute 0 02 Thousand/uL      Lymphocytes Absolute 1 56 Thousands/µL      Monocytes Absolute 0 40 Thousand/µL      Eosinophils Absolute 0 04 Thousand/µL      Basophils Absolute 0 02 Thousands/µL                  CTA ED chest PE study   Final Result by Darron Chang DO (01/31 1506)      Normal CT pulmonary angiogram                   Workstation performed: [id number]         XR chest 1 view portable    (Results Pending)              Procedures  Procedures         ED Course  ED Course as of Jan 31 1603   Sun Jan 31, 2021   1339 Discussed lab results with patient  Advised elevated D-dimer so at the proceed with the CT a this time  1340 Discussed TSH results which showed a high TSH indicating an underactive thyroid  Advised the patient that this needs to be followed up with further thyroid studies including T3-T4  This should be followed up with her primary doctor to have these tests as an outpatient                  HEART Risk Score      Most Recent Value   Heart Score Risk Calculator   History  0 Filed at: 01/31/2021 1522   ECG  0 Filed at: 01/31/2021 1522   Age  2 Filed at: 01/31/2021 1522   Risk Factors  1 Filed at: 01/31/2021 1522   Troponin  0 Filed at: 01/31/2021 1522   HEART Score  3 Filed at: 01/31/2021 1522                          Wells' Criteria for PE      Most Recent Value   Wells' Criteria for PE   Clinical signs and symptoms of DVT  0 Filed at: 01/31/2021 1335   PE is primary diagnosis or equally likely  3 Filed at: 01/31/2021 1335   HR >100  0 Filed at: 01/31/2021 1335   Immobilization at least 3 days or Surgery in the previous 4 weeks  0 Filed at: 01/31/2021 1335   Previous, objectively diagnosed PE or DVT  0 Filed at: 01/31/2021 1335   Hemoptysis  0 Filed at: 01/31/2021 1335   Malignancy with treatment within 6 months or palliative  0 Filed at: 01/31/2021 1335   Wells' Criteria Total  3 Filed at: 01/31/2021 1335                MDM  Number of Diagnoses or Management Options  Chest pain:   Heart palpitations:   PVC (premature ventricular contraction):   Diagnosis management comments: Pulse ox 99% room air indicating adequate oxygenation  CXR: NAD as read by me      Discussed test results with patient  CTA negative for PE  Second troponin is negative  PVCs are becoming less frequent in the ER  Blood pressure is elevated  She will need to follow this up as an outpatient determine if she needs to be on medication long-term  Would like to follow-up with Dr Shiloh Grant for Cardiology  Will give the office a call this week to make an appointment  Patient will return if symptoms worsen or change  She will also follow-up with the primary doctor for evaluation of the elevated TSH           Amount and/or Complexity of Data Reviewed  Clinical lab tests: ordered and reviewed  Tests in the radiology section of CPT®: ordered and reviewed  Decide to obtain previous medical records or to obtain history from someone other than the patient: yes  Review and summarize past medical records: yes    Patient Progress  Patient progress: stable      Disposition  Final diagnoses:   Chest pain   Heart palpitations   PVC (premature ventricular contraction)     Time reflects when diagnosis was documented in both MDM as applicable and the Disposition within this note     Time User Action Codes Description Comment    1/31/2021  3:48 PM Katrina MILLER Add [R07 9] Chest pain     1/31/2021  3:48 PM Lily Musa Add [R00 2] Heart palpitations     1/31/2021  3:48 PM Katrin Morris Add [I49 3] PVC (premature ventricular contraction)       ED Disposition     ED Disposition Condition Date/Time Comment    Discharge Stable Sun Jan 31, 2021  3:48 PM Reema Dilling discharge to home/self care  Follow-up Information     Follow up With Specialties Details Why Contact Info    Kirstin Garza MD Cardiology In 1 week  [facility] [address]  [phone]            Discharge Medication List as of 1/31/2021  3:48 PM      CONTINUE these medications which have NOT CHANGED    Details   acetaminophen (TYLENOL) 325 mg tablet Take 650 mg by mouth every 6 (six) hours as needed for mild pain, Historical Med      Ascorbic Acid (VITAMIN C) 500 MG CAPS Take by mouth, Historical Med      calcium citrate-vitamin D (CITRACAL+D) 315-200 MG-UNIT per tablet Take by mouth, Historical Med      Coenzyme Q10 (COQ-10) 10 MG CAPS Take by mouth, Historical Med      Cyanocobalamin (VITAMIN B 12 PO) Take by mouth, Historical Med      ergocalciferol (VITAMIN D2) 50,000 units Take by mouth, Historical Med      Flax OIL Take by mouth, Historical Med      Iron Combinations (IRON COMPLEX PO) Take by mouth, Historical Med      lactobacillus acidophilus Take by mouth, Historical Med      Magnesium Oxide 140 MG CAPS Take by mouth, Historical Med      pyridoxine (VITAMIN B6) 100 mg tablet Take by mouth, Historical Med      selenium (SELENIMIN-200) 200 mcg Take by mouth, Historical Med      Vitamin E 400 units TABS Take by mouth, Historical Med           No discharge procedures on file      PDMP Review     None          ED Provider  Electronically Signed by           Gerda Thomas,   01/31/21 2946

## 2021-01-31 NOTE — ED PROCEDURE NOTE
PROCEDURE  ECG 12 Lead Documentation Only    Date/Time: 1/31/2021 12:22 PM  Performed by: Jonny Waddell DO  Authorized by: Jonny Waddell DO     ECG reviewed by me, the ED Provider: yes    Patient location:  ED  Interpretation:     Interpretation: abnormal    Rate:     ECG rate:  68    ECG rate assessment: normal    Rhythm:     Rhythm: sinus rhythm    Ectopy:     Ectopy: PVCs    ST segments:     ST segments:  Normal  T waves:     T waves: normal    Other findings:     Other findings: [address]  01/31/21 1222

## 2021-02-01 LAB
ATRIAL RATE: 68 BPM
P AXIS: 39 DEGREES
PR INTERVAL: 180 MS
QRS AXIS: -6 DEGREES
QRSD INTERVAL: 90 MS
QT INTERVAL: 394 MS
QTC INTERVAL: 418 MS
T WAVE AXIS: 21 DEGREES
VENTRICULAR RATE: 68 BPM

## 2021-02-01 PROCEDURE — 93010 ELECTROCARDIOGRAM REPORT: CPT | Performed by: INTERNAL MEDICINE

## 2021-02-04 ENCOUNTER — TELEPHONE (OUTPATIENT)
Dept: CARDIOLOGY CLINIC | Facility: CLINIC | Age: 66
End: 2021-02-04

## 2021-02-04 ENCOUNTER — CONSULT (OUTPATIENT)
Dept: CARDIOLOGY CLINIC | Facility: CLINIC | Age: 66
End: 2021-02-04
Payer: MEDICARE

## 2021-02-04 VITALS
SYSTOLIC BLOOD PRESSURE: 160 MMHG | DIASTOLIC BLOOD PRESSURE: 82 MMHG | WEIGHT: 205 LBS | HEART RATE: 69 BPM | RESPIRATION RATE: 18 BRPM | OXYGEN SATURATION: 100 % | BODY MASS INDEX: 32.95 KG/M2 | TEMPERATURE: 99.2 F | HEIGHT: 66 IN

## 2021-02-04 DIAGNOSIS — R00.2 INTERMITTENT PALPITATIONS: ICD-10-CM

## 2021-02-04 DIAGNOSIS — I49.3 PVCS (PREMATURE VENTRICULAR CONTRACTIONS): ICD-10-CM

## 2021-02-04 DIAGNOSIS — E03.9 ACQUIRED HYPOTHYROIDISM: ICD-10-CM

## 2021-02-04 DIAGNOSIS — R07.89 ATYPICAL CHEST PAIN: ICD-10-CM

## 2021-02-04 DIAGNOSIS — E66.9 CLASS 1 OBESITY: ICD-10-CM

## 2021-02-04 DIAGNOSIS — I10 UNCONTROLLED HYPERTENSION: Primary | ICD-10-CM

## 2021-02-04 PROBLEM — E66.811 CLASS 1 OBESITY: Status: ACTIVE | Noted: 2021-02-04

## 2021-02-04 PROCEDURE — 99204 OFFICE O/P NEW MOD 45 MIN: CPT | Performed by: INTERNAL MEDICINE

## 2021-02-04 PROCEDURE — 93000 ELECTROCARDIOGRAM COMPLETE: CPT | Performed by: INTERNAL MEDICINE

## 2021-02-04 RX ORDER — CETIRIZINE HYDROCHLORIDE 10 MG/1
10 TABLET ORAL AS NEEDED
COMMUNITY
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
## 2021-02-04 NOTE — PATIENT INSTRUCTIONS
1    As soon as possible, do home blood pressures for four consecutive days  2   Take blood pressures after resting for at least 15 minutes while seated in a chair or at a table with arm supported on arm rest or table  Do 3 readings, one after the other, both morning and evening for 4 consecutive days  3   Write down each and every reading with date and time and get list of readings on the blood pressure data sheet given to you by Dr Michael Hannon to the office of Dr Michael Hannon for his review  You may drop-off, fax, e-mail, or send by regular mail  4   Our office will contact you with further instructions and the results of this review  5   We have ordered a treadmill stress test, echocardiogram, 24 hour Holter monitor, repeat TSH level, free T4, free T3, lipid panel tibia done on the day of your testing  6  We will contact you as soon as we have the bulk of the tests available

## 2021-02-08 ENCOUNTER — HOSPITAL ENCOUNTER (OUTPATIENT)
Dept: NON INVASIVE DIAGNOSTICS | Facility: HOSPITAL | Age: 66
Discharge: HOME | End: 2021-02-08
Attending: INTERNAL MEDICINE
Payer: MEDICARE

## 2021-02-08 DIAGNOSIS — R07.89 ATYPICAL CHEST PAIN: ICD-10-CM

## 2021-02-08 DIAGNOSIS — I49.3 PVCS (PREMATURE VENTRICULAR CONTRACTIONS): ICD-10-CM

## 2021-02-08 DIAGNOSIS — I10 UNCONTROLLED HYPERTENSION: ICD-10-CM

## 2021-02-08 DIAGNOSIS — R00.2 INTERMITTENT PALPITATIONS: ICD-10-CM

## 2021-02-08 PROCEDURE — 93226 XTRNL ECG REC<48 HR SCAN A/R: CPT

## 2021-02-08 PROCEDURE — 93306 TTE W/DOPPLER COMPLETE: CPT

## 2021-02-08 PROCEDURE — 93306 TTE W/DOPPLER COMPLETE: CPT | Performed by: INTERNAL MEDICINE

## 2021-02-08 PROCEDURE — 93225 XTRNL ECG REC<48 HRS REC: CPT

## 2021-02-10 ENCOUNTER — APPOINTMENT (OUTPATIENT)
Dept: NON INVASIVE DIAGNOSTICS | Facility: HOSPITAL | Age: 66
End: 2021-02-10
Attending: INTERNAL MEDICINE
Payer: MEDICARE

## 2021-02-10 ENCOUNTER — HOSPITAL ENCOUNTER (OUTPATIENT)
Dept: NON INVASIVE DIAGNOSTICS | Facility: HOSPITAL | Age: 66
Discharge: HOME | End: 2021-02-10
Attending: INTERNAL MEDICINE
Payer: MEDICARE

## 2021-02-10 DIAGNOSIS — R00.2 INTERMITTENT PALPITATIONS: ICD-10-CM

## 2021-02-10 DIAGNOSIS — R07.89 ATYPICAL CHEST PAIN: ICD-10-CM

## 2021-02-10 DIAGNOSIS — I49.3 PVCS (PREMATURE VENTRICULAR CONTRACTIONS): ICD-10-CM

## 2021-02-10 LAB
CHEST PAIN STATEMENT: NORMAL
MAX DIASTOLIC BP: 84 MMHG
MAX HEART RATE: 137 BPM
MAX PREDICTED HEART RATE: 155 BPM
MAX. SYSTOLIC BP: 190 MMHG
PROTOCOL NAME: NORMAL
REASON FOR TERMINATION: NORMAL
TARGET HR FORMULA: NORMAL
TEST INDICATION: NORMAL
TIME IN EXERCISE PHASE: NORMAL

## 2021-02-10 PROCEDURE — 93018 CV STRESS TEST I&R ONLY: CPT | Performed by: INTERNAL MEDICINE

## 2021-02-10 PROCEDURE — 93016 CV STRESS TEST SUPVJ ONLY: CPT | Performed by: INTERNAL MEDICINE

## 2021-02-10 PROCEDURE — 93017 CV STRESS TEST TRACING ONLY: CPT

## 2021-02-11 ENCOUNTER — TELEPHONE (OUTPATIENT)
Dept: CARDIOLOGY CLINIC | Facility: CLINIC | Age: 66
End: 2021-02-11

## 2021-02-11 NOTE — TELEPHONE ENCOUNTER
----- Message from Rohit Bernabe MD sent at 2/10/2021  4:35 PM EST -----  Notify patient that stress test from 02/10/2021 was normal except for the extra heartbeats that we had otherwise noticed before and mildly elevated blood pressure  Her recent echocardiogram did not show anything serious  We are still waiting for the heart monitor report blood test results and home blood pressure readings  Encourage her to get her blood work done and to do her home blood pressures as I instructed
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
## 2021-02-12 ENCOUNTER — TRANSCRIBE ORDERS (OUTPATIENT)
Dept: ADMINISTRATIVE | Facility: HOSPITAL | Age: 66
End: 2021-02-12

## 2021-02-12 ENCOUNTER — LAB (OUTPATIENT)
Dept: LAB | Facility: HOSPITAL | Age: 66
End: 2021-02-12
Attending: INTERNAL MEDICINE
Payer: MEDICARE

## 2021-02-12 DIAGNOSIS — E66.9 CLASS 1 OBESITY: ICD-10-CM

## 2021-02-12 DIAGNOSIS — I49.3 PVCS (PREMATURE VENTRICULAR CONTRACTIONS): ICD-10-CM

## 2021-02-12 DIAGNOSIS — E03.9 ACQUIRED HYPOTHYROIDISM: ICD-10-CM

## 2021-02-12 DIAGNOSIS — R00.2 INTERMITTENT PALPITATIONS: ICD-10-CM

## 2021-02-12 DIAGNOSIS — I10 UNCONTROLLED HYPERTENSION: ICD-10-CM

## 2021-02-12 LAB
CHOLEST SERPL-MCNC: 203 MG/DL (ref 50–200)
HDLC SERPL-MCNC: 75 MG/DL
LDLC SERPL CALC-MCNC: 120 MG/DL (ref 0–100)
NONHDLC SERPL-MCNC: 128 MG/DL
T3FREE SERPL-MCNC: 2.76 PG/ML (ref 2.3–4.2)
T4 FREE SERPL-MCNC: 1 NG/DL (ref 0.76–1.46)
TRIGL SERPL-MCNC: 38 MG/DL
TSH SERPL DL<=0.05 MIU/L-ACNC: 4.06 UIU/ML (ref 0.36–3.74)

## 2021-02-12 PROCEDURE — 36415 COLL VENOUS BLD VENIPUNCTURE: CPT

## 2021-02-12 PROCEDURE — 84443 ASSAY THYROID STIM HORMONE: CPT

## 2021-02-12 PROCEDURE — 84481 FREE ASSAY (FT-3): CPT

## 2021-02-12 PROCEDURE — 80061 LIPID PANEL: CPT

## 2021-02-12 PROCEDURE — 84439 ASSAY OF FREE THYROXINE: CPT

## 2021-02-17 ENCOUNTER — TELEPHONE (OUTPATIENT)
Dept: CARDIOLOGY CLINIC | Facility: CLINIC | Age: 66
End: 2021-02-17

## 2021-02-17 NOTE — TELEPHONE ENCOUNTER
S/W Francoise Jha at [facility] cath lab who states she will track down the report and get it taken care of

## 2021-02-17 NOTE — TELEPHONE ENCOUNTER
----- Message from Otto Rdz MD sent at 2/17/2021 12:35 PM EST -----  We have everything back on this patient except for her Holter monitor  Please call cath lab and ask them to have one of the doctors read it as soon as possible  and scan the actual report into epic as soon as possible so I can look  at it    I need to call this patient back with her results and need the Holter monitor as the only outstanding test

## 2021-02-19 ENCOUNTER — DOCUMENTATION (OUTPATIENT)
Dept: CARDIOLOGY CLINIC | Facility: CLINIC | Age: 66
End: 2021-02-19

## 2021-02-19 ENCOUNTER — TELEPHONE (OUTPATIENT)
Dept: CARDIOLOGY CLINIC | Facility: CLINIC | Age: 66
End: 2021-02-19

## 2021-02-19 NOTE — PROGRESS NOTES
Average blood pressure between 2/5/21 and 02/08/2021 was calculated by Dr Roby Kelly to be 139/85, mildly elevated  We are still awaiting the formal report of the Holter monitor before arranging for further follow-up

## 2021-02-19 NOTE — TELEPHONE ENCOUNTER
I spoke with Sasha Encarnacion @ [facility] lab who states that the holdup is due to an issue at the [address]  She is trying to rectify the situation and will send the report ASAP

## 2021-02-19 NOTE — TELEPHONE ENCOUNTER
----- Message from Javier Krueger MD sent at 2/19/2021  1:31 PM EST -----  Regarding: Still awaiting Holter report  I need the results of the 02/08/2021 Holter monitor report on this patient as soon as possible  At a minimum, the cath lab needs to scan in the report pages to epic, so I can review these prior to contacting the patient  Please call the cath lab at [facility] and let them know this  This is the 2nd time I have requested this in the past week  Please express to the cath Lab folks that I am getting quite frustrated regarding the long delays in these Holter monitor report reports being read or made available to review in epic

## 2021-02-20 PROCEDURE — 93227 XTRNL ECG REC<48 HR R&I: CPT | Performed by: INTERNAL MEDICINE

## 2021-02-23 ENCOUNTER — TELEPHONE (OUTPATIENT)
Dept: CARDIOLOGY CLINIC | Facility: CLINIC | Age: 66
End: 2021-02-23

## 2021-02-23 NOTE — TELEPHONE ENCOUNTER
----- Message from Rashad Saini MD sent at 2/23/2021 10:06 AM EST -----  Please contact the patient to schedule a follow-up visit with me to discuss the results of all of her studies  This would be done much better in person than over the phone  If we can get her in sometime in March, that would be ideal   Let her know that we have everything back now

## 2021-03-03 ENCOUNTER — IMMUNIZATIONS (OUTPATIENT)
Dept: FAMILY MEDICINE CLINIC | Facility: HOSPITAL | Age: 66
End: 2021-03-03

## 2021-03-03 DIAGNOSIS — Z23 ENCOUNTER FOR IMMUNIZATION: Primary | ICD-10-CM

## 2021-03-03 PROCEDURE — 0011A SARS-COV-2 / COVID-19 MRNA VACCINE (MODERNA) 100 MCG: CPT

## 2021-03-03 PROCEDURE — 91301 SARS-COV-2 / COVID-19 MRNA VACCINE (MODERNA) 100 MCG: CPT

## 2021-03-19 ENCOUNTER — OFFICE VISIT (OUTPATIENT)
Dept: VASCULAR SURGERY | Facility: CLINIC | Age: 66
End: 2021-03-19
Payer: MEDICARE

## 2021-03-19 VITALS
TEMPERATURE: 97.1 F | HEIGHT: 66 IN | HEART RATE: 60 BPM | BODY MASS INDEX: 31.82 KG/M2 | WEIGHT: 198 LBS | DIASTOLIC BLOOD PRESSURE: 94 MMHG | SYSTOLIC BLOOD PRESSURE: 140 MMHG

## 2021-03-19 DIAGNOSIS — I83.893 VARICOSE VEINS OF BOTH LEGS WITH EDEMA: Primary | ICD-10-CM

## 2021-03-19 PROCEDURE — 99213 OFFICE O/P EST LOW 20 MIN: CPT | Performed by: SURGERY

## 2021-03-19 NOTE — PROGRESS NOTES
Assessment/Plan:    No problem-specific Assessment & Plan notes found for this encounter  {Assess/PlanSmartLinks:46256}      Subjective:      Patient ID: Mago Hunt is a 72 y o  female  Patient w/ longstanding symptomatic BLE VV w/ pain and swelling that has been progressively worsening  Presents today to review reflux study done 11/12  HPI    {Common ambulatory SmartLinks:21361}    Review of Systems   Constitutional: Negative  HENT: Negative  Eyes: Negative  Respiratory: Negative  Cardiovascular: Positive for leg swelling  Painful veins   Gastrointestinal: Negative  Endocrine: Negative  Genitourinary: Negative  Musculoskeletal: Negative  Skin: Negative  Allergic/Immunologic: Negative  Neurological: Negative  Hematological: Negative  Psychiatric/Behavioral: Negative  Objective: There were no vitals taken for this visit           Physical Exam

## 2021-03-19 NOTE — PROGRESS NOTES
[facility]  03/19/21     Assessment/Plan:    Varicose veins of both legs with edema  Symptoms predominate at the left ankle  These varicosities would be best served with sclerotherapy  I have given her the name and contact information for providers that perform sclerotherapy  Patient instructed to continue with compression stocking use, moderate exercise and leg elevation while at rest        Diagnoses and all orders for this visit:    Varicose veins of both legs with edema          Subjective:      Patient ID: Charles Macario is a 72 y o  female  Patient with bilateral lower extremity venous insufficiency marked by multiple varicosities at the lateral thighs and ankles bilaterally  Patient has been wearing compression stocking therapy since October of 2020  Venous valvular insufficiency study performed 11/12/2020 reveals incompetence of the great saphenous veins bilaterally  Patient tells me the compression stocking therapy has provided benefit  I encouraged her to continue with stocking use, moderate exercise and leg elevation while at rest   Patient mostly complains of symptoms of the varicosities at her ankle which I explained her would require sclerotherapy  I have informed her Dr Black Gaitan and our Leylay[facility] Julien 3 perform sclerotherapy the [facility]  She was also given the contact info for Dr Jeromy Morillo here in [place]  Imaging:  Venous valvular insufficiency study performed 11/12/2020 reveals bilateral great saphenous vein incompetency      The following portions of the patient's history were reviewed and updated as appropriate: allergies, current medications, past family history, past medical history, past social history, past surgical history and problem list     Review of Systems      Objective:    /94 (BP Location: Right arm, Patient Position: Sitting)   Pulse 60   Temp (!) 97 1 °F (36 2 °C) (Tympanic)   Ht 5' 6" (1 676 m)   Wt 89 8 kg (198 lb)   BMI 31 96 kg/m² Physical Exam  Vitals signs and nursing note reviewed  Constitutional:       Appearance: She is well-developed  HENT:      Head: Normocephalic and atraumatic  Eyes:      Conjunctiva/sclera: Conjunctivae normal       Pupils: Pupils are equal, round, and reactive to light  Neck:      Musculoskeletal: Normal range of motion and neck supple  Vascular: No JVD  Pulmonary:      Effort: No respiratory distress  Breath sounds: No stridor  No wheezing or rales  Chest:      Chest wall: No tenderness  Abdominal:      General: There is no distension  Palpations: There is no mass  Tenderness: There is no abdominal tenderness  There is no guarding or rebound  Musculoskeletal: Normal range of motion  General: No tenderness or deformity  Skin:     General: Skin is warm and dry  Findings: No erythema or rash  Comments: Multiple varicosities at the ankles bilaterally left greater than right  Some varicosities present at the thighs  No lipodermatosclerosis or stasis dermatitis   Neurological:      Mental Status: She is alert and oriented to person, place, and time  Psychiatric:         Behavior: Behavior normal          Thought Content:  Thought content normal            Pulse Exam  L brachial  R brachial    L radial  R radial    L inguinal  R inguinal    L popliteal  R popliteal    L posterior tibial  R posterior tibial    L peroneal  R peroneal    L dorsalis pedis  R dorsalis pedis

## 2021-03-24 ENCOUNTER — TRANSCRIBE ORDERS (OUTPATIENT)
Dept: ADMINISTRATIVE | Facility: HOSPITAL | Age: 66
End: 2021-03-24

## 2021-03-24 DIAGNOSIS — Z12.31 ENCOUNTER FOR SCREENING MAMMOGRAM FOR MALIGNANT NEOPLASM OF BREAST: Primary | ICD-10-CM

## 2021-03-25 ENCOUNTER — OFFICE VISIT (OUTPATIENT)
Dept: CARDIOLOGY CLINIC | Facility: CLINIC | Age: 66
End: 2021-03-25
Payer: MEDICARE

## 2021-03-25 VITALS
TEMPERATURE: 98.4 F | BODY MASS INDEX: 32.3 KG/M2 | SYSTOLIC BLOOD PRESSURE: 128 MMHG | HEART RATE: 68 BPM | HEIGHT: 66 IN | DIASTOLIC BLOOD PRESSURE: 76 MMHG | OXYGEN SATURATION: 99 % | WEIGHT: 201 LBS

## 2021-03-25 DIAGNOSIS — I49.3 PVCS (PREMATURE VENTRICULAR CONTRACTIONS): ICD-10-CM

## 2021-03-25 DIAGNOSIS — I10 UNCONTROLLED HYPERTENSION: Primary | ICD-10-CM

## 2021-03-25 DIAGNOSIS — E66.9 CLASS 1 OBESITY: ICD-10-CM

## 2021-03-25 DIAGNOSIS — E78.5 DYSLIPIDEMIA: ICD-10-CM

## 2021-03-25 DIAGNOSIS — E03.9 ACQUIRED HYPOTHYROIDISM: ICD-10-CM

## 2021-03-25 PROCEDURE — 99215 OFFICE O/P EST HI 40 MIN: CPT | Performed by: INTERNAL MEDICINE

## 2021-03-25 NOTE — PROGRESS NOTES
Office Cardiology Progress Note  Soila Rodriguez 72 y o  female MRN: [id number]  03/25/21  12:51 PM      ASSESSMENT:    1  Symptomatic frequent PVCs with palpitations, mainly at rest, likely present for 3-4 months  Recent Holter monitor on 02/08/2021 showed 10 8% PVC density, no complex ventricular dysrhythmias, and rare atrial ectopy  2  Recently uncontrolled essential hypertension, diagnosed on 01/31/2021 and reconfirmed on office visit today with average home blood pressure reading of 139/85 between 2/5 and 02/08/2021   3  Mild acquired hypothyroidism with mildly elevated TSH level as of 01/31/2021, which could be a contributing factor to her obesity and dyslipidemia  4  Chronic obesity, class 1 , with 4 pound weight loss in approximately two months  5   Suppressed atypical chest discomfort described as substernal pressure associated with palpitation, nonexertional with CAD  likely excluded with normal stress test on  02/10/2021   6  History of treated remote right breast cancer, treated with combination of radical right mastectomy and lymphadenectomy plus chemotherapy  7  Chronic bilateral varicose veins and superficial venous insufficiency of both lower extremities  Plan       Patient Instructions     1  Recommend repeating home blood pressures in approximately one month for four consecutive days, three readings at a time, morning and evening and recording all of those readings on the blood pressure data sheet we have previously provided  Get those back to the office of Dr Sindy Cardona for his review  2  We will assist you in scheduling a coronary artery calcium score CT scan at [facility] in order to better determine whether you would benefit from cholesterol-lowering medication  3  Recommend discussing advisability of low-dose thyroid supplementation with Dr Magdalena James, who will receive a copy of my notes  4  Continue current home regimen of supplements and exercise        HPI     Patient's last name is pronounced "Donovan begum"  This 72 y o  female  denies new cardiopulmonary and medical symptoms  She still experiences occasional left lateral decubitus position palpitations when trying to get to sleep at night but has had no severe episodes similar to that prompting her original emergency department visit  The patient started to increase the intensity of her treadmill exercise about two months ago and is currently walking on the treadmill for 25-30 minutes at a pace of 2 6 miles/hour with a slight incline  The patient's home blood pressure between 2/5 and 02/08/2021 averaged 139/85, which is mildly elevated  The patient believes that her increased exercise should help lower her blood pressure and prefers to try that modality before considering any medication  The patient is treadmill stress test was negative except for resting and recovery phase PVCs  Her 24 hour Holter monitor showed frequent PVCs, 10 8% of all beats with no runs or malignant forms and very infrequent atrial ectopy  No symptoms were recorded  Lab work is detailed below, and she has a borderline increase in 10 year ASCVD risk of 5 8%, which is approximately 50% greater than optimum  The patient was seen by Dr Anabell Sheffield regarding her lower extremity venous insufficiency and varicose veins  Continuation of compression stocking therapy was recommended, along with moderate exercise in leg elevation at rest   Consideration of future sclerotherapy for her ankle varicose veins was mentioned but is not currently being pursued by the patient  I advised the patient to have coronary artery calcium scoring to better define the need for statin therapy  We also discussed management of her mild hypertension, and agreed to a plan to recheck her blood pressures for four consecutive days in approximately one more month, after she has had additional time to increase her exercise program and lose some additional weight      Because of her mild hypothyroidism, and its possible role in worsening her dyslipidemia, I recommended she discuss with Dr Dileep Sánchez whether it might be advisable to try her on low-dose levothyroxine  I will leave that to his discretion  The patient is also being seen in follow-up of the below listed diagnoses  Encounter Diagnoses   Name Primary?     Uncontrolled hypertension Yes    Dyslipidemia     PVCs (premature ventricular contractions)     Class 1 obesity     Acquired hypothyroidism         Review of Systems    All other systems negative, except as noted in history of present illness    Historical Information   Past Medical History:   Diagnosis Date    BRCA1 negative     BRCA2 negative     Breast cancer ([facility] [address] )     History of chemotherapy      Past Surgical History:   Procedure Laterality Date    HYSTERECTOMY      MASTECTOMY Right     1995    MASTECTOMY Right     OOPHORECTOMY      TONSILLECTOMY      TUMOR REMOVAL      mult tumor removals from age of 15 on     Social History     Substance and Sexual Activity   Alcohol Use No     Social History     Substance and Sexual Activity   Drug Use No     Social History     Tobacco Use   Smoking Status Never Smoker   Smokeless Tobacco Never Used       Family History:  Family History   Problem Relation Age of Onset    Brain cancer Mother    Osker Ok Lung cancer Father     Prostate cancer Father     Lung cancer Sister     Ovarian cancer Sister     No Known Problems Brother     No Known Problems Maternal Aunt     No Known Problems Maternal Uncle     No Known Problems Paternal Aunt     No Known Problems Paternal Uncle     No Known Problems Maternal Grandmother     No Known Problems Maternal Grandfather     No Known Problems Paternal Grandmother     No Known Problems Paternal Grandfather     ADD / ADHD Neg Hx     Anesthesia problems Neg Hx     Cancer Neg Hx     Clotting disorder Neg Hx     Collagen disease Neg Hx     Diabetes Neg Hx     Dislocations Neg Hx     Learning disabilities Neg Hx     Neurological problems Neg Hx     Osteoporosis Neg Hx     Rheumatologic disease Neg Hx     Scoliosis Neg Hx     Vascular Disease Neg Hx          Meds/Allergies     Prior to Admission medications    Medication Sig Start Date End Date Taking?  Authorizing Provider   acetaminophen (TYLENOL) 325 mg tablet Take 650 mg by mouth every 6 (six) hours as needed for mild pain   Yes Historical Provider, MD   Ascorbic Acid (VITAMIN C) 500 MG CAPS Take by mouth   Yes Historical Provider, MD   calcium citrate-vitamin D (CITRACAL+D) 315-200 MG-UNIT per tablet Take by mouth   Yes Historical Provider, MD   cetirizine (ZyrTEC) 10 mg tablet Take 10 mg by mouth as needed   Yes Historical Provider, MD   Cholecalciferol (Vitamin D3) 125 MCG (5000 UT) TABS Take 5,000 Units by mouth daily   Yes Historical Provider, MD   Coenzyme Q10 (COQ-10) 10 MG CAPS Take by mouth   Yes Historical Provider, MD   Cyanocobalamin (VITAMIN B 12 PO) Take by mouth   Yes Historical Provider, MD   ergocalciferol (VITAMIN D2) 50,000 units Take by mouth   Yes Historical Provider, MD   Flax OIL Take by mouth   Yes Historical Provider, MD   Iron Combinations (IRON COMPLEX PO) Take by mouth   Yes Historical Provider, MD   lactobacillus acidophilus Take by mouth   Yes Historical Provider, MD   Magnesium Oxide 140 MG CAPS Take by mouth   Yes Historical Provider, MD   patient supplied medication neprinol 2x daily   Yes Historical Provider, MD   pyridoxine (VITAMIN B6) 100 mg tablet Take by mouth   Yes Historical Provider, MD   selenium (SELENIMIN-200) 200 mcg Take by mouth   Yes Historical Provider, MD   Vitamin E 400 units TABS Take by mouth   Yes Historical Provider, MD       Allergies   Allergen Reactions    Shellfish-Derived Products Anaphylaxis    Augmentin [Amoxicillin-Pot Clavulanate]     Codeine Other (See Comments)     " I can't function "    Hydrocodone-Acetaminophen Vomiting    Levofloxacin Nausea Only and Vomiting Vitals:    03/25/21 0921 03/25/21 0933   BP: 124/76 128/76   BP Location: Left arm Left arm   Patient Position: Sitting Sitting   Cuff Size: Large Large   Pulse: 68    Temp: 98 4 °F (36 9 °C)    SpO2: 99%    Weight: 91 2 kg (201 lb)    Height: 5' 6" (1 676 m)        Body mass index is 32 44 kg/m²  4 pound weight loss in approximately two months    Physical Exam:    General Appearance:  Alert, cooperative, no distress, appears stated age and is mildly obese  Head:  Normocephalic, without obvious abnormality, atraumatic   Eyes:  PERRL, conjunctiva/corneas clear, EOM's intact,   both eyes   Ears:  Normal TM's and external ear canals, both ears   Nose: Nares normal, septum midline, mucosa normal, no drainage or sinus tenderness   Throat: Lips, mucosa, and tongue normal; teeth and gums normal   Neck: Supple, symmetrical, trachea midline, no adenopathy, thyroid: not enlarged, symmetric, no tenderness/mass/nodules, no carotid bruit or JVD   Back:   Symmetric, no curvature, ROM normal, no CVA tenderness   Lungs:   Clear to auscultation bilaterally, respirations unlabored   Chest Wall:  No tenderness or deformity   Heart:  Regular rate and rhythm, S1, S2 normal, no murmur, rub or gallop   Abdomen:   Soft, non-tender, bowel sounds active all four quadrants,  no masses, no organomegaly and with mild to moderate obesity noted     Extremities: Extremities normal, atraumatic, no cyanosis or edema   Pulses: 2+ and symmetric   Skin: Skin showed normal color, texture, turgor and no rashes or lesions   Lymph nodes: Cervical, supraclavicular, and axillary nodes normal   Neurologic: Normal         Cardiographics    ECG  02/10/2021:    Normal sinus rhythm at 73 bpm   Frequent unifocal RV extrasystoles  Nonspecific T abnormality in V3 with otherwise normal ECG pattern    24 hour Holter monitor 02/08/2021:    Frequent PVCs, 10 8% of total beats, including ventricular bigeminy and trigeminy with some couplets  Infrequent episodes of supraventricular ectopy  No symptoms    Treadmill stress test 02/10/2021:    Normal exercise stress test except for resting hypertension and isolated PVCs pre and post exercise    IMAGING:    No Chest XR results available for this patient  Transthoracic echocardiogram 02/08/2021:    LVEF of 50-55% with borderline LVH and normal diastolic filling  Normal RV  Mild LAE   Mild mitral leaflet thickening with 1+ MR  1+ AI/PI  1-2+TR with normal RV systolic pressure      LAB REVIEW:      Lab Results   Component Value Date    SODIUM 140 01/31/2021    K 3 9 01/31/2021     01/31/2021    CO2 29 01/31/2021    ANIONGAP 12 12/05/2013    BUN 13 01/31/2021    CREATININE 0 79 01/31/2021    GLUCOSE 101 12/05/2013    CALCIUM 8 7 01/31/2021    CORRECTEDCA 9 2 01/31/2021    AST 16 01/31/2021    ALT 24 01/31/2021    ALKPHOS 79 01/31/2021    PROT 7 5 11/13/2013    BILITOT 0 14 (L) 11/13/2013    EGFR 79 01/31/2021     Lab Results   Component Value Date    CHOLESTEROL 203 (H) 02/12/2021     Lab Results   Component Value Date    HDL 75 02/12/2021       Lab Results   Component Value Date    LDLCALC 120 (H) 02/12/2021     No components found for: [facility]  Lab Results   Component Value Date    TRIG 38 02/12/2021     Additional laboratory data 01/31/2021:    Normal CBC, PT/ PTT, troponin x 2, magnesium, lipase  TSH 02/12/2021:  4 062 with normal free T4 and T3   D-dimer:  1 20    5 8% 10 year ASCVD risk    This entire visit was restricted to face-to-face counseling to the patient regarding recent test results, diagnosis, probable causes, further workup and testing and treatment  A total of 47 minutes was spent in face-to-face discussion with the patient          Deysi Nickerson MD

## 2021-03-25 NOTE — PATIENT INSTRUCTIONS
1  Recommend repeating home blood pressures in approximately one month for four consecutive days, three readings at a time, morning and evening and recording all of those readings on the blood pressure data sheet we have previously provided  Get those back to the office of Dr Jacqueline Simons for his review  2  We will assist you in scheduling a coronary artery calcium score CT scan at [facility] in order to better determine whether you would benefit from cholesterol-lowering medication  3  Recommend discussing advisability of low-dose thyroid supplementation with Dr Xiomara Umaña, who will receive a copy of my notes  4  Continue current home regimen of supplements and exercise

## 2021-03-31 ENCOUNTER — TELEPHONE (OUTPATIENT)
Dept: CARDIOLOGY CLINIC | Facility: CLINIC | Age: 66
End: 2021-03-31

## 2021-03-31 NOTE — TELEPHONE ENCOUNTER
Patient called asking if it is ok for her to get her cardiac MRI after having her 2nd vaccine shot on 4/1  Does she need to wait 30-90 days after her vaccine to get the cardiac MRI? Please call patient on her cell  To notify her [phone] to leave a message if she is not avail to  the call

## 2021-03-31 NOTE — TELEPHONE ENCOUNTER
The patient is scheduled for a coronary artery calcium score CT scan, not a cardiac MRI  She does not have to wait at all after her vaccine to get the coronary calcium score test   The vaccine has no effect on that at all  Notify her on her cell phone

## 2021-04-01 ENCOUNTER — IMMUNIZATIONS (OUTPATIENT)
Dept: FAMILY MEDICINE CLINIC | Facility: HOSPITAL | Age: 66
End: 2021-04-01

## 2021-04-01 DIAGNOSIS — Z23 ENCOUNTER FOR IMMUNIZATION: Primary | ICD-10-CM

## 2021-04-01 PROCEDURE — 91301 SARS-COV-2 / COVID-19 MRNA VACCINE (MODERNA) 100 MCG: CPT

## 2021-04-01 PROCEDURE — 0012A SARS-COV-2 / COVID-19 MRNA VACCINE (MODERNA) 100 MCG: CPT

## 2021-04-14 ENCOUNTER — APPOINTMENT (OUTPATIENT)
Dept: RADIOLOGY | Facility: CLINIC | Age: 66
End: 2021-04-14
Payer: MEDICARE

## 2021-04-14 ENCOUNTER — OFFICE VISIT (OUTPATIENT)
Dept: OBGYN CLINIC | Facility: CLINIC | Age: 66
End: 2021-04-14
Payer: MEDICARE

## 2021-04-14 VITALS
WEIGHT: 200 LBS | DIASTOLIC BLOOD PRESSURE: 88 MMHG | HEIGHT: 66 IN | BODY MASS INDEX: 32.14 KG/M2 | SYSTOLIC BLOOD PRESSURE: 157 MMHG | HEART RATE: 71 BPM

## 2021-04-14 DIAGNOSIS — M25.552 PAIN IN LEFT HIP: ICD-10-CM

## 2021-04-14 DIAGNOSIS — M53.3 PAIN OF LEFT SACROILIAC JOINT: Primary | ICD-10-CM

## 2021-04-14 DIAGNOSIS — M54.50 LOW BACK PAIN, UNSPECIFIED BACK PAIN LATERALITY, UNSPECIFIED CHRONICITY, UNSPECIFIED WHETHER SCIATICA PRESENT: ICD-10-CM

## 2021-04-14 PROCEDURE — 99214 OFFICE O/P EST MOD 30 MIN: CPT | Performed by: ORTHOPAEDIC SURGERY

## 2021-04-14 PROCEDURE — 72100 X-RAY EXAM L-S SPINE 2/3 VWS: CPT

## 2021-04-14 PROCEDURE — 73502 X-RAY EXAM HIP UNI 2-3 VIEWS: CPT

## 2021-04-14 NOTE — PROGRESS NOTES
Assessment/Plan:  1  Pain of left sacroiliac joint  XR spine lumbar 2 or 3 views injury    Ambulatory referral to Physical Therapy   2  Pain in left hip  XR hip/pelv 2-3 vws left if performed         Valdez Swift has left hip pain which is localized to her left SI joint  I discussed with her that physical therapy is the most appropriate treatment at this time  I also recommended possibility of  A fluoroscopic guided SI joint injection  She does not want to proceed with the injection at this time will try therapy first   We could consider the injection the future if the pain persists  She can take Tylenol but should not take any NSAIDs  We will avoid oral steroids as well at this time  Follow-up after therapy if the pain persists  Subjective:   Aura Stanley is a 72 y o  female who presents  To the office for the acute onset of left-sided low back and hip pain  She denies any injury or trauma  The pain began around 2 weeks ago  She is unsure if related to her second COVID vaccine or not  She has been going to physical therapy for 2 sessions and was advised to come to our office for evaluation  She does have a history low back pain with disc bulging several years ago  She has been compliant with her home directed exercise program   She states that she has been having sharp pinpoint discomfort in the left lumbar and hip region  Worsens when she is trying to cross her leg put on shoes  Does not radiate down her leg or cause any numbness  She denies any traumatic injury in this area  Review of Systems   Constitutional: Negative for chills, fever and unexpected weight change  HENT: Negative for hearing loss, nosebleeds and sore throat  Eyes: Negative for pain, redness and visual disturbance  Respiratory: Negative for cough, shortness of breath and wheezing  Cardiovascular: Negative for chest pain, palpitations and leg swelling  Gastrointestinal: Negative for abdominal pain, nausea and vomiting  Endocrine: Negative for polydipsia and polyuria  Genitourinary: Negative for dysuria and hematuria  Musculoskeletal:        See HPI   Skin: Negative for rash and wound  Neurological: Negative for dizziness, numbness and headaches  Psychiatric/Behavioral: Negative for decreased concentration and suicidal ideas  The patient is not nervous/anxious            Past Medical History:   Diagnosis Date    BRCA1 negative     BRCA2 negative     Breast cancer ([facility] [address]     History of chemotherapy        Past Surgical History:   Procedure Laterality Date    HYSTERECTOMY      MASTECTOMY Right     1995    MASTECTOMY Right     OOPHORECTOMY      TONSILLECTOMY      TUMOR REMOVAL      mult tumor removals from age of 15 on       Family History   Problem Relation Age of Onset   Hazel Daniels Brain cancer Mother    Hazel Daniels Lung cancer Father     Prostate cancer Father     Lung cancer Sister     Ovarian cancer Sister     No Known Problems Brother     No Known Problems Maternal Aunt     No Known Problems Maternal Uncle     No Known Problems Paternal Aunt     No Known Problems Paternal Uncle     No Known Problems Maternal Grandmother     No Known Problems Maternal Grandfather     No Known Problems Paternal Grandmother     No Known Problems Paternal Grandfather     ADD / ADHD Neg Hx     Anesthesia problems Neg Hx     Cancer Neg Hx     Clotting disorder Neg Hx     Collagen disease Neg Hx     Diabetes Neg Hx     Dislocations Neg Hx     Learning disabilities Neg Hx     Neurological problems Neg Hx     Osteoporosis Neg Hx     Rheumatologic disease Neg Hx     Scoliosis Neg Hx     Vascular Disease Neg Hx        Social History     Occupational History    Not on file   Tobacco Use    Smoking status: Never Smoker    Smokeless tobacco: Never Used   Substance and Sexual Activity    Alcohol use: No    Drug use: No    Sexual activity: Not on file         Current Outpatient Medications:     acetaminophen (TYLENOL) 325 mg tablet, Take 650 mg by mouth every 6 (six) hours as needed for mild pain, Disp: , Rfl:     Ascorbic Acid (VITAMIN C) 500 MG CAPS, Take by mouth, Disp: , Rfl:     calcium citrate-vitamin D (CITRACAL+D) 315-200 MG-UNIT per tablet, Take by mouth, Disp: , Rfl:     cetirizine (ZyrTEC) 10 mg tablet, Take 10 mg by mouth as needed, Disp: , Rfl:     Cholecalciferol (Vitamin D3) 125 MCG (5000 UT) TABS, Take 5,000 Units by mouth daily, Disp: , Rfl:     Coenzyme Q10 (COQ-10) 10 MG CAPS, Take by mouth, Disp: , Rfl:     Cyanocobalamin (VITAMIN B 12 PO), Take by mouth, Disp: , Rfl:     ergocalciferol (VITAMIN D2) 50,000 units, Take by mouth, Disp: , Rfl:     Flax OIL, Take by mouth, Disp: , Rfl:     Iron Combinations (IRON COMPLEX PO), Take by mouth, Disp: , Rfl:     lactobacillus acidophilus, Take by mouth, Disp: , Rfl:     Magnesium Oxide 140 MG CAPS, Take by mouth, Disp: , Rfl:     patient supplied medication, neprinol 2x daily, Disp: , Rfl:     pyridoxine (VITAMIN B6) 100 mg tablet, Take by mouth, Disp: , Rfl:     selenium (SELENIMIN-200) 200 mcg, Take by mouth, Disp: , Rfl:     Vitamin E 400 units TABS, Take by mouth, Disp: , Rfl:     Allergies   Allergen Reactions    Shellfish-Derived Products - Food Allergy Anaphylaxis    Augmentin [Amoxicillin-Pot Clavulanate]     Codeine Other (See Comments)     " I can't function "    Hydrocodone-Acetaminophen Vomiting    Levofloxacin Nausea Only and Vomiting       Objective:  Vitals:    04/14/21 1305   BP: 157/88   Pulse: 71       Left Hip Exam     Tenderness   The patient is experiencing tenderness in the posterior  Range of Motion   External rotation: normal   Internal rotation: normal     Tests   RADHA: positive    Other   Erythema: absent  Sensation: normal  Pulse: present      Back Exam     Tenderness   Back tenderness location:   Pinpoint tenderness to the left SI joint      Range of Motion   Extension: normal   Flexion: normal     Muscle Strength   Right Quadriceps:  5/5   Left Quadriceps:  5/5   Right Hamstrings:  5/5   Left Hamstrings:  5/5     Tests   Straight leg raise right: negative  Straight leg raise left: negative    Other   Sensation: normal  Gait: normal             Physical Exam  Vitals signs reviewed  Constitutional:       Appearance: She is well-developed  HENT:      Head: Normocephalic and atraumatic  Eyes:      Conjunctiva/sclera: Conjunctivae normal       Pupils: Pupils are equal, round, and reactive to light  Neck:      Musculoskeletal: Normal range of motion and neck supple  Cardiovascular:      Rate and Rhythm: Normal rate  Pulmonary:      Effort: Pulmonary effort is normal  No respiratory distress  Skin:     General: Skin is warm and dry  Neurological:      Mental Status: She is alert and oriented to person, place, and time  Psychiatric:         Behavior: Behavior normal          I have personally reviewed pertinent films in PACS and my interpretation is as follows: Three-view x-rays of the lumbar spine demonstrate degenerative changes without significant change previous imaging  Three-view x-rays left hip demonstrate no acute abnormality

## 2021-05-06 ENCOUNTER — HOSPITAL ENCOUNTER (OUTPATIENT)
Dept: RADIOLOGY | Facility: HOSPITAL | Age: 66
Discharge: HOME | End: 2021-05-06
Attending: FAMILY MEDICINE
Payer: MEDICARE

## 2021-05-06 VITALS — HEIGHT: 66 IN | WEIGHT: 198 LBS | BODY MASS INDEX: 31.82 KG/M2

## 2021-05-06 DIAGNOSIS — Z12.31 ENCOUNTER FOR SCREENING MAMMOGRAM FOR MALIGNANT NEOPLASM OF BREAST: ICD-10-CM

## 2021-05-06 PROCEDURE — 77067 SCR MAMMO BI INCL CAD: CPT

## 2021-05-06 PROCEDURE — 77063 BREAST TOMOSYNTHESIS BI: CPT

## 2021-06-24 ENCOUNTER — OFFICE VISIT (OUTPATIENT)
Dept: OTOLARYNGOLOGY | Facility: CLINIC | Age: 66
End: 2021-06-24
Payer: MEDICARE

## 2021-06-24 VITALS — TEMPERATURE: 98.3 F | HEIGHT: 66 IN | BODY MASS INDEX: 31.82 KG/M2 | WEIGHT: 198 LBS

## 2021-06-24 DIAGNOSIS — H61.23 BILATERAL IMPACTED CERUMEN: ICD-10-CM

## 2021-06-24 DIAGNOSIS — K11.20 SIALADENITIS: Primary | ICD-10-CM

## 2021-06-24 PROCEDURE — 69210 REMOVE IMPACTED EAR WAX UNI: CPT | Performed by: OTOLARYNGOLOGY

## 2021-06-24 PROCEDURE — 99204 OFFICE O/P NEW MOD 45 MIN: CPT | Performed by: OTOLARYNGOLOGY

## 2021-06-24 RX ORDER — EPINEPHRINE 0.3 MG/.3ML
INJECTION SUBCUTANEOUS AS NEEDED
COMMUNITY
Start: 2021-03-30 | End: 2021-10-27 | Stop reason: SDUPTHER

## 2021-06-24 NOTE — PROGRESS NOTES
[facility]  [facility]  [facility] Otolaryngology      Otolaryngology -- Head and Neck Surgery New Patient Visit    Lazaro Garrido is a 72 y o  who presents with a chief complaint of neck problem    Pertinent elements of the history include:  She has seen many physicians for her current problem  Symptoms began in 2019 with neck discomfort, treated with 2 rounds of abx  Concern for "blocked salivary gland" at the time  Saw ENT -- recommended sialogogues  Went to [facility], had an ultrasound that was negative  She describes right neck swelling and irritation along the mandiublar symphysis  Dental eval and panorex was negative  Had an MR neck in [address] [place] [address] in 2019 -- normal study  A repeat MR was performed -- also negative  Review of systems: Pertinent review of systems documented in the HPI  10 point ROS documented in a separate note, as necessary  Results reviewed; images from any scan have been personally reviewed: The past medical, surgical, social and family history have been reviewed as documented in today's record      Past Medical History:   Diagnosis Date    BRCA1 negative     BRCA2 negative     Breast cancer ([facility] [address] )     History of chemotherapy        Past Surgical History:   Procedure Laterality Date    HYSTERECTOMY      MASTECTOMY Right     1995    MASTECTOMY Right     OOPHORECTOMY      TONSILLECTOMY      TUMOR REMOVAL      mult tumor removals from age of 15 on       Family History   Problem Relation Age of Onset   Arcelia Roualvina Brain cancer Mother     Lung cancer Father     Prostate cancer Father     Lung cancer Sister     Ovarian cancer Sister     No Known Problems Brother     No Known Problems Maternal Aunt     No Known Problems Maternal Uncle     No Known Problems Paternal Aunt     No Known Problems Paternal Uncle     No Known Problems Maternal Grandmother     No Known Problems Maternal Grandfather     Breast cancer Paternal Grandmother         unkown age   Clifm Alexys No Known Problems Paternal Grandfather     ADD / ADHD Neg Hx     Anesthesia problems Neg Hx     Cancer Neg Hx     Clotting disorder Neg Hx     Collagen disease Neg Hx     Diabetes Neg Hx     Dislocations Neg Hx     Learning disabilities Neg Hx     Neurological problems Neg Hx     Osteoporosis Neg Hx     Rheumatologic disease Neg Hx     Scoliosis Neg Hx     Vascular Disease Neg Hx        Social History     Socioeconomic History    Marital status: /Civil Union     Spouse name: Not on file    Number of children: Not on file    Years of education: Not on file    Highest education level: Not on file   Occupational History    Not on file   Tobacco Use    Smoking status: Never Smoker    Smokeless tobacco: Never Used   Vaping Use    Vaping Use: Never used   Substance and Sexual Activity    Alcohol use: No    Drug use: No    Sexual activity: Not on file   Other Topics Concern    Not on file   Social History Narrative    Not on file     Social Determinants of Health     Financial Resource Strain:     Difficulty of Paying Living Expenses:    Food Insecurity:     Worried About Running Out of Food in the Last Year:     Ran Out of Food in the Last Year:    Transportation Needs:     Lack of Transportation (Medical):      Lack of Transportation (Non-Medical):    Physical Activity:     Days of Exercise per Week:     Minutes of Exercise per Session:    Stress:     Feeling of Stress :    Social Connections:     Frequency of Communication with Friends and Family:     Frequency of Social Gatherings with Friends and Family:     Attends [employer] Services:     Active Member of Clubs or Organizations:     Attends Club or Organization Meetings:     Marital Status:    Intimate Partner Violence:     Fear of Current or Ex-Partner:     Emotionally Abused:     Physically Abused:     Sexually Abused:        Current Outpatient Medications on File Prior to Visit   Medication Sig Dispense Refill    acetaminophen (TYLENOL) 325 mg tablet Take 650 mg by mouth every 6 (six) hours as needed for mild pain      Ascorbic Acid (VITAMIN C) 500 MG CAPS Take by mouth      cetirizine (ZyrTEC) 10 mg tablet Take 10 mg by mouth as needed      Cholecalciferol (Vitamin D3) 125 MCG (5000 UT) TABS Take 5,000 Units by mouth daily      Coenzyme Q10 (COQ-10) 10 MG CAPS Take by mouth      Cyanocobalamin (VITAMIN B 12 PO) Take by mouth      EPINEPHrine (EPIPEN) 0 3 mg/0 3 mL SOAJ as needed      Flax OIL Take by mouth      Iron Combinations (IRON COMPLEX PO) Take by mouth      lactobacillus acidophilus Take by mouth      Magnesium Oxide 140 MG CAPS Take by mouth      patient supplied medication neprinol 2x daily      pyridoxine (VITAMIN B6) 100 mg tablet Take by mouth      selenium (SELENIMIN-200) 200 mcg Take by mouth      Vitamin E 400 units TABS Take by mouth      calcium citrate-vitamin D (CITRACAL+D) 315-200 MG-UNIT per tablet Take by mouth (Patient not taking: Reported on 6/24/2021)      ergocalciferol (VITAMIN D2) 50,000 units Take by mouth (Patient not taking: Reported on 6/24/2021)       No current facility-administered medications on file prior to visit  Physical exam:   Temp 98 3 °F (36 8 °C) (Temporal)   Ht 5' 6" (1 676 m)   Wt 89 8 kg (198 lb)   BMI 31 96 kg/m²     Constitutional:  Well developed, well nourished and groomed, in no acute distress  Eyes:  Extra-ocular movements intact, pupils equally round and reactive to light and accommodation, the lids and conjunctivae are normal in appearance  Head: Atraumatic, normocephalic, no visible scalp lesions, bony palpation unremarkable without stepoffs, parotid and submandibular salivary glands non-tender to palpation and without masses bilaterally  Ears:  Auricles normal in appearance bilaterally, mastoid prominence non-tender, external auditory canals clear bilaterally  Tympanic membranes intact  Normal appearing ossicles  Bilateral cerumen impaction cleared     Nose/Sinuses:  External appearance unremarkable, no maxillary or frontal sinus tenderness to palpation bilaterally  Anterior rhinoscopy reveals: normal mucosa     Oral Cavity:  Moist mucus membranes, gums and dentition unremarkable, no oral mucosal masses or lesions, floor of mouth soft, tongue mobile without masses or lesions  Oropharynx:  Base of tongue soft and without masses, tonsils bilaterally unremarkable, soft palate mucosa unremarkable  Neck:  No visible or palpable cervical lesions or lymphadenopathy, thyroid gland is normal in size and symmetry and without masses, normal laryngeal elevation with swallowing  Tender right submandibular gland     Cardiovascular:  Normal rate and rhythm, no palpable thrills, no jugulovenous distension observed  Respiratory:  Normal respiratory effort without evidence of retractions or use of accessory muscles  Integument:  Normal appearing without observed masses or lesions  Neurologic:  Cranial nerves II-XII intact bilaterally  Psychiatric:  Alert and oriented to time, place and person, normal affect  Procedures  Bilateral cerumen impaction cleared with the operating microscope and right angled pick and suction  Assessment:   1  Sialadenitis  CT soft tissue neck with contrast   2  Bilateral impacted cerumen         Orders  Orders Placed This Encounter   Procedures    CT soft tissue neck with contrast     Standing Status:   Future     Standing Expiration Date:   6/24/2025     Scheduling Instructions:      Nothing to eat 3 hours prior to this test   We encourage you to drink clear liquid up until the time of your study  Please bring your insurance cards, a form of photo ID and a list of your medications with you  Arrive 15 minutes prior to your appointment time to register  On the day of your test, please bring any prior CT or MRI studies of this area with you that were not performed at a [facility]  To schedule this appointment, please contact Central Scheduling at [phone]  Order Specific Question:   What is the patient's sedation requirement? Answer:   No Sedation     Order Specific Question:   Contrast information:     Answer:   IV     Order Specific Question:   Did the patient ever have a reaction to x-ray dye? If yes, please verify the type of allergy and order the contrast allergy prep  Answer:   No     Order Specific Question:   Release to patient through Mychart     Answer:   Immediate     Order Specific Question:   Reason for Exam (FREE TEXT)     Answer:   right submandibular sialadenitis         Discussion/Plan:    1  Status post bilateral cerumen disimpaction  2  Additionally she has chronic years long right-sided recurrent submandibular salivary swelling and long-term tenderness to palpation  No mass was appreciated  The gland was tender to palpation and also slightly firmer than the opposite submandibular gland  To rule out a stone or other stricture I ordered a CT neck with contrast   She will follow-up after the study

## 2021-06-25 ENCOUNTER — TELEPHONE (OUTPATIENT)
Dept: OTOLARYNGOLOGY | Facility: CLINIC | Age: 66
End: 2021-06-25

## 2021-06-25 NOTE — TELEPHONE ENCOUNTER
Patient has scheduled her CT for 7/12, however patient needs to have her BUN/Creatnine done prior to that  Patient would like to go "later on this afternoon" to have this done (if possible)  Secondly, the patient asks if Dr Rishabh Palomares could please prescribe 1 pill of "valium or something of that nature" for the patient to have the CT done  Patient complained of slight claustrophobia  Please send to the [employer] on file  Please advise

## 2021-07-05 ENCOUNTER — APPOINTMENT (OUTPATIENT)
Dept: LAB | Facility: HOSPITAL | Age: 66
End: 2021-07-05
Attending: OTOLARYNGOLOGY
Payer: MEDICARE

## 2021-07-05 DIAGNOSIS — Z92.89 HX OF CT SCAN: ICD-10-CM

## 2021-07-05 LAB
BUN SERPL-MCNC: 18 MG/DL (ref 5–25)
CREAT SERPL-MCNC: 0.7 MG/DL (ref 0.6–1.3)
GFR SERPL CREATININE-BSD FRML MDRD: 91 ML/MIN/1.73SQ M

## 2021-07-05 PROCEDURE — 36415 COLL VENOUS BLD VENIPUNCTURE: CPT

## 2021-07-05 PROCEDURE — 82565 ASSAY OF CREATININE: CPT

## 2021-07-05 PROCEDURE — 84520 ASSAY OF UREA NITROGEN: CPT

## 2021-07-12 ENCOUNTER — HOSPITAL ENCOUNTER (OUTPATIENT)
Dept: RADIOLOGY | Facility: HOSPITAL | Age: 66
Discharge: HOME | End: 2021-07-12
Attending: OTOLARYNGOLOGY
Payer: MEDICARE

## 2021-07-12 DIAGNOSIS — K11.20 SIALADENITIS: ICD-10-CM

## 2021-07-12 PROCEDURE — 70491 CT SOFT TISSUE NECK W/DYE: CPT

## 2021-07-12 RX ADMIN — IOHEXOL 85 ML: 350 INJECTION, SOLUTION INTRAVENOUS at 09:57

## 2021-07-23 ENCOUNTER — TELEPHONE (OUTPATIENT)
Dept: CARDIOLOGY CLINIC | Facility: CLINIC | Age: 66
End: 2021-07-23

## 2021-07-23 NOTE — TELEPHONE ENCOUNTER
Requesting an updated order for CT coronary calcium score  Original order expires on 7/25/2021  I made patient aware that I was sending request to Dr Candy Bullock and would make aware when order was in chart

## 2021-07-26 DIAGNOSIS — E78.5 DYSLIPIDEMIA: Primary | ICD-10-CM

## 2021-07-26 NOTE — TELEPHONE ENCOUNTER
I called patient made aware of order availability as well as expiration date   I will mail to patient per patient request

## 2021-07-26 NOTE — TELEPHONE ENCOUNTER
Placed new  order on 7/26 with 3 month expiration  Patient can  requisition slip if she wishes or we can mail it to her

## 2021-08-05 ENCOUNTER — OFFICE VISIT (OUTPATIENT)
Dept: OTOLARYNGOLOGY | Facility: CLINIC | Age: 66
End: 2021-08-05
Payer: MEDICARE

## 2021-08-05 VITALS — WEIGHT: 198 LBS | HEIGHT: 66 IN | TEMPERATURE: 98.5 F | BODY MASS INDEX: 31.82 KG/M2

## 2021-08-05 DIAGNOSIS — K11.20 SIALADENITIS: Primary | ICD-10-CM

## 2021-08-05 PROCEDURE — 99213 OFFICE O/P EST LOW 20 MIN: CPT | Performed by: OTOLARYNGOLOGY

## 2021-08-05 RX ORDER — PREDNISONE 20 MG/1
TABLET ORAL
COMMUNITY
Start: 2021-07-30 | End: 2021-10-27 | Stop reason: ALTCHOICE

## 2021-08-05 NOTE — PROGRESS NOTES
Otolaryngology-- Head and Neck Surgery Follow up visit      CC: CT neck review        Pertinent interval elements of the history:  She returns to review her CT neck  Review of any relevant imaging:  CT NECK WITH CONTRAST     INDICATION:   K11 20: Sialoadenitis, unspecified  Right submandibular sialadenitis  Right-sided neck pain      COMPARISON:  None      TECHNIQUE:  Axial, sagittal, and coronal 2D reformatted images were created from the axial source data and submitted for interpretation      Radiation dose length product (DLP) for this visit:  602 mGy-cm   This examination, like all CT scans performed in the [facility], was performed utilizing techniques to minimize radiation dose exposure, including the use of iterative   reconstruction and automated exposure control      IV Contrast:  85 mL of iohexol (OMNIPAQUE)     IMAGE QUALITY:  Diagnostic      FINDINGS:     VISUALIZED BRAIN PARENCHYMA:  No acute intracranial pathology of the visualized brain parenchyma      VISUALIZED ORBITS AND PARANASAL SINUSES:  Normal      NASAL CAVITY AND NASOPHARYNX:  Normal      SUPRAHYOID NECK:  Normal oral cavity, tongue base, tonsillar fossa and epiglottis      INFRAHYOID NECK:  Aryepiglottic folds and piriform sinuses are normal   Normal glottis and subglottic airway      THYROID GLAND:  Unremarkable      PAROTID AND SUBMANDIBULAR GLANDS:  Asymmetric fatty atrophy of the left parotid gland is noted  There is no parotid or submandibular mass or stone identified  No evidence of ductal dilatation      LYMPH NODES:  No pathologic or enlarged adenopathy      VASCULAR STRUCTURES:  Normal enhancement of the cervical vasculature      THORACIC INLET:  Lung apices and upper mediastinum are unremarkable      BONY STRUCTURES: No acute fracture or destructive osseous lesion      IMPRESSION:     No evidence of sialoadenitis  No evidence of sialolith  Left-sided parotid fatty atrophy noted    No evidence of mass, recurrent submandibular swelling  We discussed the risk and benefits of surgery but she would prefer to manage her symptoms with glandular massage and hydration  Follow up PRN

## 2021-08-30 ENCOUNTER — HOSPITAL ENCOUNTER (OUTPATIENT)
Dept: CT IMAGING | Facility: HOSPITAL | Age: 66
Discharge: HOME | End: 2021-08-30
Payer: COMMERCIAL

## 2021-08-30 DIAGNOSIS — E78.5 DYSLIPIDEMIA: ICD-10-CM

## 2021-08-30 PROCEDURE — 75571 CT HRT W/O DYE W/CA TEST: CPT

## 2021-08-30 PROCEDURE — G1004 CDSM NDSC: HCPCS

## 2021-09-07 ENCOUNTER — TELEPHONE (OUTPATIENT)
Dept: CARDIOLOGY CLINIC | Facility: CLINIC | Age: 66
End: 2021-09-07

## 2021-09-07 NOTE — RESULT ENCOUNTER NOTE
Notify patient that    her coronary artery calcium score was perfect at 0, meaning there is no urgency about starting on any cholesterol medication  This means her risk is low for heart attack or stroke over the next 3-5 years  Continued efforts to lose more weight are still suggested

## 2021-09-07 NOTE — TELEPHONE ENCOUNTER
----- Message from Lorin Dill MD sent at 9/7/2021 11:43 AM EDT -----  Notify patient that    her coronary artery calcium score was perfect at 0, meaning there is no urgency about starting on any cholesterol medication  This means her risk is low for heart attack or stroke over the next 3-5 years  Continued efforts to lose more weight are still suggested

## 2021-10-27 ENCOUNTER — OFFICE VISIT (OUTPATIENT)
Dept: CARDIOLOGY CLINIC | Facility: CLINIC | Age: 66
End: 2021-10-27
Payer: MEDICARE

## 2021-10-27 VITALS
DIASTOLIC BLOOD PRESSURE: 88 MMHG | BODY MASS INDEX: 32.62 KG/M2 | HEIGHT: 66 IN | HEART RATE: 63 BPM | WEIGHT: 203 LBS | TEMPERATURE: 97.6 F | OXYGEN SATURATION: 98 % | SYSTOLIC BLOOD PRESSURE: 130 MMHG

## 2021-10-27 DIAGNOSIS — R00.2 INTERMITTENT PALPITATIONS: ICD-10-CM

## 2021-10-27 DIAGNOSIS — I83.893 VARICOSE VEINS OF BOTH LEGS WITH EDEMA: ICD-10-CM

## 2021-10-27 DIAGNOSIS — R07.89 ATYPICAL CHEST PAIN: ICD-10-CM

## 2021-10-27 DIAGNOSIS — E66.9 CLASS 1 OBESITY: ICD-10-CM

## 2021-10-27 DIAGNOSIS — T78.02XS ANAPHYLACTIC SHOCK DUE TO CRUSTACEANS, SEQUELA: ICD-10-CM

## 2021-10-27 DIAGNOSIS — E78.5 DYSLIPIDEMIA: ICD-10-CM

## 2021-10-27 DIAGNOSIS — I10 UNCONTROLLED HYPERTENSION: ICD-10-CM

## 2021-10-27 DIAGNOSIS — I49.3 PVCS (PREMATURE VENTRICULAR CONTRACTIONS): Primary | ICD-10-CM

## 2021-10-27 PROBLEM — T78.02XA: Status: ACTIVE | Noted: 2021-10-27

## 2021-10-27 PROCEDURE — 93000 ELECTROCARDIOGRAM COMPLETE: CPT | Performed by: INTERNAL MEDICINE

## 2021-10-27 PROCEDURE — 99214 OFFICE O/P EST MOD 30 MIN: CPT | Performed by: INTERNAL MEDICINE

## 2021-10-27 RX ORDER — EPINEPHRINE 0.3 MG/.3ML
0.3 INJECTION SUBCUTANEOUS AS NEEDED
Qty: 1 ML | Refills: 5
Start: 2021-10-27

## 2021-10-27 RX ORDER — TETRACYCLINE HCL 500 MG
500 CAPSULE ORAL 2 TIMES DAILY
COMMUNITY

## 2021-11-16 ENCOUNTER — OFFICE VISIT (OUTPATIENT)
Dept: OBGYN CLINIC | Facility: CLINIC | Age: 66
End: 2021-11-16
Payer: MEDICARE

## 2021-11-16 VITALS — WEIGHT: 203 LBS | BODY MASS INDEX: 32.77 KG/M2

## 2021-11-16 DIAGNOSIS — Z13.820 SCREENING FOR OSTEOPOROSIS: ICD-10-CM

## 2021-11-16 DIAGNOSIS — Z78.0 MENOPAUSE: ICD-10-CM

## 2021-11-16 DIAGNOSIS — Z01.419 ENCNTR FOR GYN EXAM (GENERAL) (ROUTINE) W/O ABN FINDINGS: Primary | ICD-10-CM

## 2021-11-16 DIAGNOSIS — Z12.31 BREAST CANCER SCREENING BY MAMMOGRAM: ICD-10-CM

## 2021-11-16 PROCEDURE — G0101 CA SCREEN;PELVIC/BREAST EXAM: HCPCS | Performed by: NURSE PRACTITIONER

## 2022-06-09 ENCOUNTER — OFFICE VISIT (OUTPATIENT)
Dept: CARDIOLOGY CLINIC | Facility: CLINIC | Age: 67
End: 2022-06-09
Payer: MEDICARE

## 2022-06-09 VITALS
BODY MASS INDEX: 34.39 KG/M2 | HEART RATE: 69 BPM | DIASTOLIC BLOOD PRESSURE: 80 MMHG | WEIGHT: 214 LBS | TEMPERATURE: 97.6 F | OXYGEN SATURATION: 98 % | HEIGHT: 66 IN | SYSTOLIC BLOOD PRESSURE: 132 MMHG

## 2022-06-09 DIAGNOSIS — R07.89 ATYPICAL CHEST PAIN: ICD-10-CM

## 2022-06-09 DIAGNOSIS — I49.3 PVCS (PREMATURE VENTRICULAR CONTRACTIONS): ICD-10-CM

## 2022-06-09 DIAGNOSIS — E78.5 DYSLIPIDEMIA: ICD-10-CM

## 2022-06-09 DIAGNOSIS — R00.2 INTERMITTENT PALPITATIONS: Primary | ICD-10-CM

## 2022-06-09 DIAGNOSIS — E03.9 ACQUIRED HYPOTHYROIDISM: ICD-10-CM

## 2022-06-09 DIAGNOSIS — I83.893 VARICOSE VEINS OF BOTH LEGS WITH EDEMA: ICD-10-CM

## 2022-06-09 DIAGNOSIS — I10 UNCONTROLLED HYPERTENSION: ICD-10-CM

## 2022-06-09 DIAGNOSIS — R42 INTERMITTENT LIGHTHEADEDNESS: ICD-10-CM

## 2022-06-09 PROCEDURE — 99215 OFFICE O/P EST HI 40 MIN: CPT | Performed by: INTERNAL MEDICINE

## 2022-06-09 NOTE — PATIENT INSTRUCTIONS
Please contact your gastroenterologist and have them request a formal cardiac clearance by calling our office at [phone] or by faxing  a   request to [phone]  We will contact you with the results of the recent blood pressure readings after Dr Xavi Sales calculate the average overall reading and let you know if we believe any specific medication is required for control of the blood pressure  There is no cardiac contraindication to proposed colonoscopy on 06/13/2022  We have requested an updated 24 hour Holter monitor to make sure there are no life-threatening forms of ventricular rhythm disturbances  Continue on current medication  We have requested a TSH level to recheck thyroid function, and have reprinted previously ordered lipid panel and chemistry panel and provided these to you to get the blood work done in the near future  Cardiology follow-up approximately six months with EKG

## 2022-06-10 PROCEDURE — 93000 ELECTROCARDIOGRAM COMPLETE: CPT | Performed by: INTERNAL MEDICINE

## 2022-06-10 NOTE — PROGRESS NOTES
Office Cardiology Progress Note  Abhay Living 77 y o  female MRN: [id number]  06/09/22  10:23 PM      ASSESSMENT:    1  Recent exacerbation of symptomatic frequent PVCs with palpitations, with documented ventricular trigeminy on current EKG, mainly at rest, beginning on 06/04/2022, with chronic PVCs likely present for  approximately 1-1/2 years  Previous Holter monitor on 02/08/2021 showed 10 8% PVC density, no complex ventricular dysrhythmias, and rare atrial ectopy  2   History of uncontrolled essential hypertension, diagnosed on 01/31/2021 and reconfirmed on last office visit  with average home blood pressure reading of 139/85 between 2/5 and 02/08/2021  Today's office blood pressure is 132/80  Recent average blood pressure at home between 6/4 and 06/08/2022 was mildly 140/80 with target of 129/79 or less  3  Mild acquired hypothyroidism with mildly elevated TSH level as of 01/31/2021, which could be a contributing factor to her obesity and dyslipidemia  Therapy deemed unnecessary by primary care physician  4  Chronic obesity, class 1 , with 1 6  pound weight gain in approximately 7-1/2  months and 3 6 pound weight gain in approximately 1 2 years  5   Suppressed atypical chest discomfort described as substernal pressure associated with palpitation, nonexertional with CAD  likely excluded with normal stress test on  02/10/2021   6  History of treated remote right breast cancer, treated with combination of radical right mastectomy and lymphadenectomy plus chemotherapy  7  Chronic bilateral varicose veins and superficial venous insufficiency of both lower extremities, controlled with support hosiery, but recently increased discomfort in lower extremities  8  Low cardiac risk for colonoscopy on 06/13/2022, but anesthesiology will need to be warned about patient's frequent PVCs  Plan       Patient Instructions     1   Please contact your gastroenterologist and have them request a formal cardiac clearance by calling our office at [phone] or by faxing  a   request to [phone]     2  We will contact you with the results of the recent blood pressure readings after Dr Binu Jack calculate the average overall reading and let you know if we believe any specific medication is required for control of the blood pressure  3  There is no cardiac contraindication to proposed colonoscopy on 06/13/2022   4  We have requested an updated 24 hour Holter monitor to make sure there are no life-threatening forms of ventricular rhythm disturbances  5  Continue on current medication  6  We have requested a TSH level to recheck thyroid function, and have reprinted previously ordered lipid panel and chemistry panel and provided these to you to get the blood work done in the near future  7  Cardiology follow-up approximately six months with EKG  HPI    This 77 y o  female  is seen with a number of new concerns, having missed her six month follow-up about six weeks ago  The patient is currently scheduled for colonoscopy by [facility] on 06/13/2022 and wonders whether she has a stable enough cardiac status to undergo that procedure  Beginning on 06/04/2022 the patient became aware of palpitations with irregular heart action, which has persisted ever since then  She also became alarmed by higher than usual blood pressure readings with peak systolic blood pressure of 790 and diastolic blood pressure 790 on 06/04/2022  Dr Binu Jack calculated her average blood pressure at home between 6/4/22 and 06/08/2022 with final result of 140/80  The patient claims that she still does exercise twice a day to consisting of 20 minutes of stretches, weights, or bands every morning and 20 minutes of bed exercises or walking later in the day  The patient remains independent with activities of daily living      The patient complains of increased discomfort in her lower extremities related to her known chronic bilateral varicose veins, for which she was evaluated more than one year ago by vascular surgery  The patient is being seen in follow-up of the below listed diagnoses  Encounter Diagnoses   Name Primary?     Intermittent palpitations Yes    PVCs (premature ventricular contractions)     Uncontrolled hypertension     Atypical chest pain     Acquired hypothyroidism     Intermittent lightheadedness     Varicose veins of both legs with edema     Dyslipidemia         Review of Systems    All other systems negative, except as noted in history of present illness    Historical Information   Past Medical History:   Diagnosis Date    BRCA1 negative     BRCA2 negative     Breast cancer ([facility] [address]     History of chemotherapy      Past Surgical History:   Procedure Laterality Date    HYSTERECTOMY      MASTECTOMY Right     1995    MASTECTOMY Right     OOPHORECTOMY      TONSILLECTOMY      TUMOR REMOVAL      mult tumor removals from age of 15 on     Social History     Substance and Sexual Activity   Alcohol Use No     Social History     Substance and Sexual Activity   Drug Use No     Social History     Tobacco Use   Smoking Status Never Smoker   Smokeless Tobacco Never Used       Family History:  Family History   Problem Relation Age of Onset    Brain cancer Mother    Julieann Frankel Lung cancer Father     Prostate cancer Father     Lung cancer Sister     Ovarian cancer Sister     No Known Problems Brother     No Known Problems Maternal Aunt     No Known Problems Maternal Uncle     No Known Problems Paternal Aunt     No Known Problems Paternal Uncle     No Known Problems Maternal Grandmother     No Known Problems Maternal Grandfather     Breast cancer Paternal Grandmother         unkown age   Julieann Frankel No Known Problems Paternal Grandfather     ADD / ADHD Neg Hx     Anesthesia problems Neg Hx     Cancer Neg Hx     Clotting disorder Neg Hx     Collagen disease Neg Hx     Diabetes Neg Hx     Dislocations Neg Hx     Learning disabilities Neg Hx     Neurological problems Neg Hx     Osteoporosis Neg Hx     Rheumatologic disease Neg Hx     Scoliosis Neg Hx     Vascular Disease Neg Hx          Meds/Allergies     Prior to Admission medications    Medication Sig Start Date End Date Taking?  Authorizing Provider   acetaminophen (TYLENOL) 325 mg tablet Take 650 mg by mouth every 6 (six) hours as needed for mild pain   Yes Historical Provider, MD   Apple Cider Vinegar 500 MG TABS Take 500 mg by mouth 2 (two) times a day   Yes Historical Provider, MD   Ascorbic Acid (VITAMIN C) 500 MG CAPS Take by mouth   Yes Historical Provider, MD   cetirizine (ZyrTEC) 10 mg tablet Take 10 mg by mouth as needed   Yes Historical Provider, MD   Cholecalciferol (Vitamin D3) 125 MCG (5000 UT) TABS Take 5,000 Units by mouth daily   Yes Historical Provider, MD   Coenzyme Q10 (COQ-10) 10 MG CAPS Take by mouth   Yes Historical Provider, MD   Cyanocobalamin (VITAMIN B 12 PO) Take by mouth   Yes Historical Provider, MD   diazepam (VALIUM) 5 mg tablet TAKE 1 TABLET BY MOUTH EVERY 6 HOURS IF NEEDED FOR ANXIETY 6/25/21  Yes Jesus Carter MD   EPINEPHrine (EPIPEN) 0 3 mg/0 3 mL SOAJ Inject 0 3 mL (0 3 mg total) into a muscle as needed for anaphylaxis 10/27/21  Yes Jessy Lindo MD   Flax OIL Take by mouth   Yes Historical Provider, MD   Iron Combinations (IRON COMPLEX PO) Take by mouth   Yes Historical Provider, MD   lactobacillus acidophilus Take by mouth   Yes Historical Provider, MD   Magnesium Oxide 140 MG CAPS Take by mouth   Yes Historical Provider, MD   patient supplied medication Take 2 tablets by mouth daily in the early morning Super Beets nitric oxide    Yes Historical Provider, MD   pyridoxine (VITAMIN B6) 100 mg tablet Take by mouth   Yes Historical Provider, MD   selenium 200 mcg Take by mouth   Yes Historical Provider, MD   SUPER B COMPLEX/C PO Take by mouth   Yes Historical Provider, MD   Vitamin E 400 units TABS Take by mouth Yes Historical Provider, MD       Allergies   Allergen Reactions    Shellfish-Derived Products - Food Allergy Anaphylaxis    Augmentin [Amoxicillin-Pot Clavulanate]     Codeine Other (See Comments)     " I can't function "    Hydrocodone-Acetaminophen Vomiting    Levofloxacin Nausea Only and Vomiting         Vitals:    06/09/22 0757   BP: 132/80   BP Location: Left arm   Patient Position: Sitting   Cuff Size: Large   Pulse: 69   Temp: 97 6 °F (36 4 °C)   SpO2: 98%   Weight: 97 1 kg (214 lb)   Height: 5' 6" (1 676 m)       Body mass index is 34 54 kg/m²  1 6 pound weight gain in approximately 7-1/2 months and 3 6 pound weight gain in approximately 1 2 years    Physical Exam:    General Appearance:  Alert, cooperative, no distress, appears stated age and is moderately obese  Head:  Normocephalic, without obvious abnormality, atraumatic   Eyes:  PERRL, conjunctiva/corneas clear, EOM's intact,   both eyes   Ears:  Normal TM's and external ear canals, both ears   Nose: Nares normal, septum midline, mucosa normal, no drainage or sinus tenderness   Throat: Lips, mucosa, and tongue normal; teeth and gums normal   Neck: Supple, symmetrical, trachea midline, no adenopathy, thyroid: not enlarged, symmetric, no tenderness/mass/nodules, no carotid bruit or JVD   Back:   Symmetric, no curvature, ROM normal, no CVA tenderness   Lungs:   Clear to auscultation bilaterally, respirations unlabored   Chest Wall:  No tenderness or deformity   Heart:  Trigeminal cardiac rhythm, S1, S2 normal, no murmur, rub or gallop   Abdomen:   Soft, non-tender, bowel sounds active all four quadrants,  no masses, no organomegaly  Moderate abdominal obesity noted     Extremities: Extremities normal, atraumatic, no cyanosis or edema   Pulses: 2+ and symmetric   Skin: Skin showed normal color, texture, turgor and no rashes or lesions   Lymph nodes: Cervical, supraclavicular, and axillary nodes normal   Neurologic: Normal Cardiographics    ECG 06/09/22:    Normal sinus rhythm at 69 bpm   Ventricular trigeminy with unifocal PVCs   Minimal voltage criteria for LVH   Poor R-wave progression and delayed precordial R/S transition  Abnormal ECG with new ventricular trigeminy compared to 10/27/2021 and no other changes    IMAGING:    No Chest XR results available for this patient  LAB REVIEW:      Lab Results   Component Value Date    SODIUM 140 01/31/2021    K 3 9 01/31/2021     01/31/2021    CO2 29 01/31/2021    ANIONGAP 12 12/05/2013    BUN 18 07/05/2021    CREATININE 0 70 07/05/2021    GLUCOSE 101 12/05/2013    CALCIUM 8 7 01/31/2021    CORRECTEDCA 9 2 01/31/2021    AST 16 01/31/2021    ALT 24 01/31/2021    ALKPHOS 79 01/31/2021    PROT 7 5 11/13/2013    BILITOT 0 14 (L) 11/13/2013    EGFR 91 07/05/2021     Lab Results   Component Value Date    CHOLESTEROL 203 (H) 02/12/2021     Lab Results   Component Value Date    HDL 75 02/12/2021     No results found for: LDLCHOLEST  Lab Results   Component Value Date    LDLCALC 120 (H) 02/12/2021     No components found for: [facility]  Lab Results   Component Value Date    TRIG 38 02/12/2021     Because of patient's alarmed state regarding recent onset of palpitation and PVC as well as uncontrolled hypertension, multiple questions regarding suitability for colonoscopy on 06/13/2022, opinion regarding need for vascular surgery to reassess her chronic varicose veins, this visit consumed 48 minutes of face-to-face time with patient            Antoni Casillas MD

## 2022-06-21 ENCOUNTER — LAB (OUTPATIENT)
Dept: LAB | Facility: CLINIC | Age: 67
End: 2022-06-21
Payer: MEDICARE

## 2022-06-21 DIAGNOSIS — E66.9 CLASS 1 OBESITY: ICD-10-CM

## 2022-06-21 DIAGNOSIS — I10 UNCONTROLLED HYPERTENSION: ICD-10-CM

## 2022-06-21 DIAGNOSIS — E03.9 ACQUIRED HYPOTHYROIDISM: ICD-10-CM

## 2022-06-21 DIAGNOSIS — E78.5 DYSLIPIDEMIA: ICD-10-CM

## 2022-06-21 LAB
ALBUMIN SERPL BCP-MCNC: 3.3 G/DL (ref 3.5–5)
ALP SERPL-CCNC: 65 U/L (ref 46–116)
ALT SERPL W P-5'-P-CCNC: 27 U/L (ref 12–78)
ANION GAP SERPL CALCULATED.3IONS-SCNC: 3 MMOL/L (ref 4–13)
AST SERPL W P-5'-P-CCNC: 18 U/L (ref 5–45)
BILIRUB SERPL-MCNC: 0.5 MG/DL (ref 0.2–1)
BUN SERPL-MCNC: 17 MG/DL (ref 5–25)
CALCIUM ALBUM COR SERPL-MCNC: 9 MG/DL (ref 8.3–10.1)
CALCIUM SERPL-MCNC: 8.4 MG/DL (ref 8.3–10.1)
CHLORIDE SERPL-SCNC: 106 MMOL/L (ref 100–108)
CHOLEST SERPL-MCNC: 218 MG/DL
CO2 SERPL-SCNC: 31 MMOL/L (ref 21–32)
CREAT SERPL-MCNC: 0.69 MG/DL (ref 0.6–1.3)
GFR SERPL CREATININE-BSD FRML MDRD: 91 ML/MIN/1.73SQ M
GLUCOSE P FAST SERPL-MCNC: 82 MG/DL (ref 65–99)
HDLC SERPL-MCNC: 64 MG/DL
LDLC SERPL CALC-MCNC: 145 MG/DL (ref 0–100)
NONHDLC SERPL-MCNC: 154 MG/DL
POTASSIUM SERPL-SCNC: 4 MMOL/L (ref 3.5–5.3)
PROT SERPL-MCNC: 7.4 G/DL (ref 6.4–8.2)
SODIUM SERPL-SCNC: 140 MMOL/L (ref 136–145)
TRIGL SERPL-MCNC: 46 MG/DL
TSH SERPL DL<=0.05 MIU/L-ACNC: 5.92 UIU/ML (ref 0.45–4.5)

## 2022-06-21 PROCEDURE — 80061 LIPID PANEL: CPT

## 2022-06-21 PROCEDURE — 84443 ASSAY THYROID STIM HORMONE: CPT

## 2022-06-21 PROCEDURE — 80053 COMPREHEN METABOLIC PANEL: CPT

## 2022-06-21 PROCEDURE — 36415 COLL VENOUS BLD VENIPUNCTURE: CPT

## 2022-06-22 ENCOUNTER — TELEPHONE (OUTPATIENT)
Dept: CARDIOLOGY CLINIC | Facility: CLINIC | Age: 67
End: 2022-06-22

## 2022-06-22 NOTE — TELEPHONE ENCOUNTER
----- Message from Rosetta Arellano MD sent at 6/21/2022  8:53 PM EDT -----  Notify patient that I received blood work from 06/21/2022 which did show some abnormalities that I would like to review with the patient in conjunction with her recent average blood pressure, in order to devise a treatment plan  See if we can schedule her for an appointment within the next 2-4 weeks for a conference  Her current 10 year ASCVD risk is 7 2%, which is borderline

## 2022-06-22 NOTE — RESULT ENCOUNTER NOTE
Notify patient that I received blood work from 06/21/2022 which did show some abnormalities that I would like to review with the patient in conjunction with her recent average blood pressure, in order to devise a treatment plan  See if we can schedule her for an appointment within the next 2-4 weeks for a conference  Her current 10 year ASCVD risk is 7 2%, which is borderline

## 2022-06-22 NOTE — TELEPHONE ENCOUNTER
Pt made aware of lab results and to schedule a follow up to discuss  Pt agreed to come in   assisted w/scheduling

## 2022-06-24 ENCOUNTER — HOSPITAL ENCOUNTER (OUTPATIENT)
Dept: NON INVASIVE DIAGNOSTICS | Facility: HOSPITAL | Age: 67
Discharge: HOME | End: 2022-06-24
Attending: INTERNAL MEDICINE
Payer: MEDICARE

## 2022-06-24 DIAGNOSIS — R42 INTERMITTENT LIGHTHEADEDNESS: ICD-10-CM

## 2022-06-24 DIAGNOSIS — I49.3 PVCS (PREMATURE VENTRICULAR CONTRACTIONS): ICD-10-CM

## 2022-06-24 DIAGNOSIS — R00.2 INTERMITTENT PALPITATIONS: ICD-10-CM

## 2022-06-24 PROCEDURE — 93226 XTRNL ECG REC<48 HR SCAN A/R: CPT

## 2022-06-24 PROCEDURE — 93225 XTRNL ECG REC<48 HRS REC: CPT

## 2022-06-28 PROCEDURE — 93227 XTRNL ECG REC<48 HR R&I: CPT | Performed by: INTERNAL MEDICINE

## 2022-07-05 ENCOUNTER — OFFICE VISIT (OUTPATIENT)
Dept: CARDIOLOGY CLINIC | Facility: CLINIC | Age: 67
End: 2022-07-05
Payer: MEDICARE

## 2022-07-05 VITALS
OXYGEN SATURATION: 99 % | TEMPERATURE: 98.6 F | HEIGHT: 66 IN | HEART RATE: 69 BPM | DIASTOLIC BLOOD PRESSURE: 86 MMHG | SYSTOLIC BLOOD PRESSURE: 142 MMHG | BODY MASS INDEX: 33.11 KG/M2 | WEIGHT: 206 LBS

## 2022-07-05 DIAGNOSIS — I10 UNCONTROLLED HYPERTENSION: ICD-10-CM

## 2022-07-05 DIAGNOSIS — R07.89 ATYPICAL CHEST PAIN: ICD-10-CM

## 2022-07-05 DIAGNOSIS — I49.3 PVCS (PREMATURE VENTRICULAR CONTRACTIONS): Primary | ICD-10-CM

## 2022-07-05 DIAGNOSIS — E03.9 ACQUIRED HYPOTHYROIDISM: ICD-10-CM

## 2022-07-05 DIAGNOSIS — I83.893 VARICOSE VEINS OF BOTH LEGS WITH EDEMA: ICD-10-CM

## 2022-07-05 DIAGNOSIS — E78.5 DYSLIPIDEMIA: ICD-10-CM

## 2022-07-05 DIAGNOSIS — R00.2 INTERMITTENT PALPITATIONS: ICD-10-CM

## 2022-07-05 DIAGNOSIS — E66.9 CLASS 1 OBESITY: ICD-10-CM

## 2022-07-05 PROCEDURE — 99214 OFFICE O/P EST MOD 30 MIN: CPT | Performed by: INTERNAL MEDICINE

## 2022-07-05 NOTE — PROGRESS NOTES
Office Cardiology Progress Note  Lazaro Garrido 77 y o  female MRN: [id number]  07/05/22  6:36 PM      ASSESSMENT:    1   Recent exacerbation of symptomatic frequent PVCs with palpitations, with documented ventricular trigeminy on recent EKG and on current exam, mainly at rest, beginning on 06/04/2022, with chronic PVCs likely present for  approximately 1-1/2+ years   Previous Holter monitor on 02/08/2021 showed 10 8% PVC density, no complex ventricular dysrhythmias, and rare atrial ectopy  Similar findings noted on 06/24/2022 Holter monitor  2   History of uncontrolled essential hypertension, diagnosed on 01/31/2021 and reconfirmed on last office visit  with average home blood pressure reading of 139/85 between 2/5 and 02/08/2021  Today's office blood pressure is 132/80  Recent average blood pressure at home between 6/4 and 06/08/2022 was mildly elevated at 140/80 with target of 129/79 or less  3  Mild acquired hypothyroidism with mildly elevated TSH level as of 01/31/2021, and again on 06/21/2022 which could be a contributing factor to her obesity and dyslipidemia and hypertension  Therapy deemed unnecessary by primary care physician, but I believe it may be helpful  4  Chronic dyslipidemia, worsened  5  Chronic obesity, class 1 , with 3 6  pound weight gain in approximately 8-1/2  months and 5 6 pound weight gain in approximately 1 3 years  6   Suppressed atypical chest discomfort described as substernal pressure associated with palpitation, nonexertional with CAD  likely excluded with normal stress test on  02/10/2021   7  History of treated remote right breast cancer, treated with combination of radical right mastectomy and lymphadenectomy plus chemotherapy  8  Chronic bilateral varicose veins and superficial venous insufficiency of both lower extremities, controlled with support hosiery, but recently increased discomfort in lower extremities    9  Low cardiac risk for colonoscopy on 06/13/2022, but anesthesiology will need to be warned about patient's frequent PVCs  it is not clear to me that patient ever had colonoscopy  Plan       Patient Instructions   1  Will send a letter to the primary care physicians requesting that the patient be started on low-dose levothyroxine because of additional risk factors of dyslipidemia, class 1 obesity and hypertension  2  We will follow up with patient regarding weight loss, blood pressure level, lipid status as originally planned in December, 2022  3  Continue current supplements, which the patient takes to optimize her immune system  4  Obtain home blood pressures for four consecutive days in approximately three months and again  in the week before next appointment in December, 2022   5   We will obtain a venous duplex reflux study to check bilateral lower extremity venous function prior to 08/19/2022 vascular surgical evaluation  6  Cardiology follow-up as previously suggested in December, 2022 with EKG  HPI    This 77 y o  female  denies new cardiopulmonary and medical symptoms  She is being seen to review recent symptoms and results of 24 hour Holter monitor  The patient's recent Holter monitor dated 06/24/2022 showed no significant changes from 02/08/2021 with 10 2% PVC density, no complex ventricular dysrhythmias and infrequent uncomplicated atrial dysrhythmias  There was occasional palpitation and chest heaviness correlating with PVCs  Patient's recent lipid panel on 06/21/2022 showed worsening dyslipidemia  The patient's recent TSH was also more elevated at 5 920 on 06/21/2022  The patient also expresses concern about the delay in her vascular surgery evaluation now scheduled for 08/19/2022  She would like to get a venous duplex reflux study done in advance of that  evaluation  The patient is also being seen in follow-up of the below listed diagnoses  Encounter Diagnoses   Name Primary?     PVCs (premature ventricular contractions) Yes    Varicose veins of both legs with edema     Uncontrolled hypertension     Atypical chest pain     Intermittent palpitations     Class 1 obesity     Dyslipidemia     Acquired hypothyroidism         Review of Systems    All other systems negative, except as noted in history of present illness    Historical Information   Past Medical History:   Diagnosis Date    BRCA1 negative     BRCA2 negative     [facility] ([facility] [address] [facility]     History of chemotherapy      Past Surgical History:   Procedure Laterality Date    HYSTERECTOMY      MASTECTOMY Right     1995    MASTECTOMY Right     OOPHORECTOMY      TONSILLECTOMY      TUMOR REMOVAL      mult tumor removals from age of 15 on     Social History     Substance and Sexual Activity   Alcohol Use No     Social History     Substance and Sexual Activity   Drug Use No     Social History     Tobacco Use   Smoking Status Never Smoker   Smokeless Tobacco Never Used       Family History:  Family History   Problem Relation Age of Onset   Robin Saeed Brain cancer Mother    Robin Saeed Lung cancer Father     Prostate cancer Father     Lung cancer Sister     Ovarian cancer Sister     No Known Problems Brother     No Known Problems Maternal Aunt     No Known Problems Maternal Uncle     No Known Problems Paternal Aunt     No Known Problems Paternal Uncle     No Known Problems Maternal Grandmother     No Known Problems Maternal Grandfather     Breast cancer Paternal [de-identified]         unkown age   Robin Saeed No Known Problems Paternal Grandfather     ADD / ADHD Neg Hx     Anesthesia problems Neg Hx     Cancer Neg Hx     Clotting disorder Neg Hx     Collagen disease Neg Hx     Diabetes Neg Hx     Dislocations Neg Hx     Learning disabilities Neg Hx     Neurological problems Neg Hx     Osteoporosis Neg Hx     Rheumatologic disease Neg Hx     Scoliosis Neg Hx     Vascular Disease Neg Hx          Meds/Allergies     Prior to Admission medications    Medication Sig Start Date End Date Taking?  Authorizing Provider   acetaminophen (TYLENOL) 325 mg tablet Take 650 mg by mouth every 6 (six) hours as needed for mild pain   Yes Historical Provider, MD   Apple Cider Vinegar 500 MG TABS Take 500 mg by mouth 2 (two) times a day   Yes Historical Provider, MD   Ascorbic Acid (VITAMIN C) 500 MG CAPS Take by mouth   Yes Historical Provider, MD   cetirizine (ZyrTEC) 10 mg tablet Take 10 mg by mouth as needed   Yes Historical Provider, MD   Cholecalciferol (Vitamin D3) 125 MCG (5000 UT) TABS Take 5,000 Units by mouth daily   Yes Historical Provider, MD   Coenzyme Q10 (COQ-10) 10 MG CAPS Take by mouth   Yes Historical Provider, MD   Cyanocobalamin (VITAMIN B 12 PO) Take by mouth   Yes Historical Provider, MD   diazepam (VALIUM) 5 mg tablet TAKE 1 TABLET BY MOUTH EVERY 6 HOURS IF NEEDED FOR ANXIETY 6/25/21  Yes Brianda Henao MD   EPINEPHrine (EPIPEN) 0 3 mg/0 3 mL SOAJ Inject 0 3 mL (0 3 mg total) into a muscle as needed for anaphylaxis 10/27/21  Yes Elma Bhakta MD   Flax OIL Take by mouth   Yes Historical Provider, MD   Iron Combinations (IRON COMPLEX PO) Take by mouth   Yes Historical Provider, MD   lactobacillus acidophilus Take by mouth   Yes Historical Provider, MD   Magnesium Oxide 140 MG CAPS Take by mouth   Yes Historical Provider, MD   patient supplied medication Take 2 tablets by mouth daily in the early morning Super Beets nitric oxide    Yes Historical Provider, MD   pyridoxine (VITAMIN B6) 100 mg tablet Take by mouth   Yes Historical Provider, MD   selenium 200 mcg Take by mouth   Yes Historical Provider, MD   SUPER B COMPLEX/C PO Take by mouth   Yes Historical Provider, MD   Vitamin E 400 units TABS Take by mouth   Yes Historical Provider, MD       Allergies   Allergen Reactions    Shellfish-Derived Products - Food Allergy Anaphylaxis    Augmentin [Amoxicillin-Pot Clavulanate]     Codeine Other (See Comments)     " I can't function "    Hydrocodone-Acetaminophen Vomiting  Levofloxacin Nausea Only and Vomiting         Vitals:    07/05/22 1509   BP: 142/86   BP Location: Left arm   Patient Position: Sitting   Cuff Size: Standard   Pulse: 69   Temp: 98 6 °F (37 °C)   SpO2: 99%   Weight: 93 4 kg (206 lb)   Height: 5' 6" (1 676 m)       Body mass index is 33 25 kg/m²  2 pound weight gain in approximately one month  Physical Exam:    General Appearance:  Alert, cooperative, no distress, appears stated age and is mildly to moderately obese  Head:  Normocephalic, without obvious abnormality, atraumatic   Eyes:  PERRL, conjunctiva/corneas clear, EOM's intact,   both eyes   Ears:  Normal TM's and external ear canals, both ears   Nose: Nares normal, septum midline, mucosa normal, no drainage or sinus tenderness   Throat: Lips, mucosa, and tongue normal; teeth and gums normal   Neck: Supple, symmetrical, trachea midline, no adenopathy, thyroid: not enlarged, symmetric, no tenderness/mass/nodules, no carotid bruit or JVD   Back:   Symmetric, no curvature, ROM normal, no CVA tenderness   Lungs:   Clear to auscultation bilaterally, respirations unlabored   Chest Wall:  No tenderness or deformity   Heart:  Frequent extrasystoles with trigeminal cardiac rhythm, S1, S2 normal, no murmur, rub or gallop   Abdomen:   Soft, non-tender, bowel sounds active all four quadrants,  no masses, no organomegaly  Moderate abdominal obesity  Extremities: Extremities normal, atraumatic, no cyanosis or edema with bilateral varicose veins noted  Pulses: 2+ and symmetric   Skin: Skin showed normal color, texture, turgor and no rashes or lesions   Lymph nodes: Cervical, supraclavicular, and axillary nodes normal   Neurologic: Normal         Cardiographics    ECG 07/05/22:    No ECG done today  24 hour Holter monitor 06/24/2022:    Frequent PVCs, 10 2% of all beats including frequent couplets, bigeminy, trigeminy with no episodes of VT    Infrequent PACs with no runs, atrial fibrillation, flutter, or SVT  No significant bradycardia or tachycardia  Chest heaviness correlating with ventricular trigeminy  Study results are similar to those obtained in February, 2021  IMAGING:    No Chest XR results available for this patient            LAB REVIEW:      Lab Results   Component Value Date    SODIUM 140 06/21/2022    K 4 0 06/21/2022     06/21/2022    CO2 31 06/21/2022    ANIONGAP 12 12/05/2013    BUN 17 06/21/2022    CREATININE 0 69 06/21/2022    GLUCOSE 101 12/05/2013    GLUF 82 06/21/2022    CALCIUM 8 4 06/21/2022    CORRECTEDCA 9 0 06/21/2022    AST 18 06/21/2022    ALT 27 06/21/2022    ALKPHOS 65 06/21/2022    PROT 7 5 11/13/2013    BILITOT 0 14 (L) 11/13/2013    EGFR 91 06/21/2022     Lab Results   Component Value Date    CHOLESTEROL 218 (H) 06/21/2022    CHOLESTEROL 203 (H) 02/12/2021     Lab Results   Component Value Date    HDL 64 06/21/2022    HDL 75 02/12/2021       Lab Results   Component Value Date    LDLCALC 145 (H) 06/21/2022    LDLCALC 120 (H) 02/12/2021     No components found for: [facility]  Lab Results   Component Value Date    TRIG 46 06/21/2022    TRIG 38 02/12/2021     7 4% 10 year ASCVD risk    TSH 06/21/2022:  5 920, mildly elevated          Willow Loredo MD

## 2022-07-05 NOTE — LETTER
Dear Dr Nixon Foss and Zohreh Banks,    I am requesting that you consider starting Mihir Elizabeth on low-dose levothyroxine, probably a dose of 25 micrograms daily in view of her documented mild hypothyroidism, associated uncontrolled dyslipidemia, progressive obesity, and essential hypertension  She will be in touch with your practice regarding this  Let me know if you have any concerns or questions regarding my suggestion  Best regards,    Symone Acosta MD

## 2022-07-05 NOTE — PATIENT INSTRUCTIONS
Will send a letter to the primary care physicians requesting that the patient be started on low-dose levothyroxine because of additional risk factors of dyslipidemia, class 1 obesity and hypertension  We will follow up with patient regarding weight loss, blood pressure level, lipid status as originally planned in December, 2022  Continue current supplements, which the patient takes to optimize her immune system  Obtain home blood pressures for four consecutive days in approximately three months and again  in the week before next appointment in December, 2022  We will obtain a venous duplex reflux study to check bilateral lower extremity venous function prior to 08/19/2022 vascular surgical evaluation  Cardiology follow-up as previously suggested in December, 2022 with EKG

## 2022-07-28 ENCOUNTER — HOSPITAL ENCOUNTER (OUTPATIENT)
Dept: RADIOLOGY | Facility: HOSPITAL | Age: 67
Discharge: HOME | End: 2022-07-28
Payer: MEDICARE

## 2022-07-28 VITALS — HEIGHT: 66 IN | BODY MASS INDEX: 33.11 KG/M2 | WEIGHT: 206 LBS

## 2022-07-28 DIAGNOSIS — Z12.31 BREAST CANCER SCREENING BY MAMMOGRAM: ICD-10-CM

## 2022-07-28 PROCEDURE — 77063 BREAST TOMOSYNTHESIS BI: CPT

## 2022-07-28 PROCEDURE — 77067 SCR MAMMO BI INCL CAD: CPT

## 2022-08-02 ENCOUNTER — HOSPITAL ENCOUNTER (OUTPATIENT)
Dept: RADIOLOGY | Facility: HOSPITAL | Age: 67
Discharge: HOME | End: 2022-08-02
Attending: INTERNAL MEDICINE
Payer: MEDICARE

## 2022-08-02 DIAGNOSIS — I83.893 VARICOSE VEINS OF BOTH LEGS WITH EDEMA: ICD-10-CM

## 2022-08-02 PROCEDURE — 93970 EXTREMITY STUDY: CPT

## 2022-08-02 PROCEDURE — 93970 EXTREMITY STUDY: CPT | Performed by: SURGERY

## 2022-08-03 ENCOUNTER — TELEPHONE (OUTPATIENT)
Dept: CARDIOLOGY CLINIC | Facility: CLINIC | Age: 67
End: 2022-08-03

## 2022-08-03 NOTE — TELEPHONE ENCOUNTER
----- Message from Aileen Hou MD sent at 8/3/2022  8:18 AM EDT -----  Notify patient that venous duplex scan did show venous reflux in the superficial veins of both lower extremities, left more than right  She should follow-up as scheduled with vascular surgery on 08/19/2022 for their guidance in management  The current study does show a little worsening of the venous reflux in the left leg when compared to the prior study dated 11/12/2020

## 2022-08-03 NOTE — TELEPHONE ENCOUNTER
edith patient, made aware of results and recommendations and was under the impression that these were the results from last ov with

## 2022-08-03 NOTE — RESULT ENCOUNTER NOTE
Notify patient that venous duplex scan did show venous reflux in the superficial veins of both lower extremities, left more than right  She should follow-up as scheduled with vascular surgery on 08/19/2022 for their guidance in management  The current study does show a little worsening of the venous reflux in the left leg when compared to the prior study dated 11/12/2020

## 2022-08-16 ENCOUNTER — TELEPHONE (OUTPATIENT)
Dept: CARDIOLOGY CLINIC | Facility: CLINIC | Age: 67
End: 2022-08-16

## 2022-08-16 DIAGNOSIS — E03.9 ACQUIRED HYPOTHYROIDISM: Primary | ICD-10-CM

## 2022-08-16 DIAGNOSIS — I10 UNCONTROLLED HYPERTENSION: ICD-10-CM

## 2022-08-16 DIAGNOSIS — E78.5 DYSLIPIDEMIA: ICD-10-CM

## 2022-08-16 NOTE — TELEPHONE ENCOUNTER
Notify patient that I did place an order for [facility] lab to do lipids, CMP, and TSH on or after 12/01/2022  Mail her a requisition slip, if she wishes

## 2022-08-16 NOTE — TELEPHONE ENCOUNTER
----- Message from Kenzie Sow sent at 8/16/2022  3:47 PM EDT -----  Regarding: Blood work prior to [email or web address] visit  Patient is due to see Dr Hans Hernandez again in December  She believes she needs blood work prior to that visit, but does not have an order  I do not see an active order in her chart  Can you double check to see if she needs lab work and if so, we can print the order and send it to her  Call back is [phone]  Thanks

## 2022-08-31 ENCOUNTER — APPOINTMENT (OUTPATIENT)
Dept: RADIOLOGY | Facility: CLINIC | Age: 67
End: 2022-08-31
Payer: MEDICARE

## 2022-08-31 ENCOUNTER — OFFICE VISIT (OUTPATIENT)
Dept: URGENT CARE | Facility: CLINIC | Age: 67
End: 2022-08-31
Payer: MEDICARE

## 2022-08-31 VITALS
HEART RATE: 77 BPM | DIASTOLIC BLOOD PRESSURE: 91 MMHG | WEIGHT: 207 LBS | RESPIRATION RATE: 18 BRPM | BODY MASS INDEX: 33.41 KG/M2 | OXYGEN SATURATION: 97 % | SYSTOLIC BLOOD PRESSURE: 161 MMHG | TEMPERATURE: 96.7 F

## 2022-08-31 DIAGNOSIS — S69.91XA INJURY OF RIGHT HAND, INITIAL ENCOUNTER: ICD-10-CM

## 2022-08-31 DIAGNOSIS — S69.91XA INJURY OF RIGHT HAND, INITIAL ENCOUNTER: Primary | ICD-10-CM

## 2022-08-31 PROCEDURE — 73140 X-RAY EXAM OF FINGER(S): CPT

## 2022-08-31 PROCEDURE — 99213 OFFICE O/P EST LOW 20 MIN: CPT | Performed by: FAMILY MEDICINE

## 2022-08-31 RX ORDER — LEVOTHYROXINE SODIUM 0.03 MG/1
25 TABLET ORAL EVERY MORNING
COMMUNITY
Start: 2022-07-30

## 2022-08-31 RX ORDER — LEVOTHYROXINE SODIUM 0.03 MG/1
25 TABLET ORAL DAILY
COMMUNITY
Start: 2022-07-06

## 2022-08-31 NOTE — PROGRESS NOTES
[address] [facility]        NAME: Yoselyn Carbajal is a 79 y o  female  : 1955    MRN: [id number]  DATE: 2022  TIME: 4:09 PM    Assessment and Plan   Injury of right hand, initial encounter [S69 [id number]]  1  Injury of right hand, initial encounter  XR finger right fifth digit-pinkie     No fractures involving PIP on x-ray; official read pending  Supportive therapy encouraged including icing and OTC pain relievers  No splinting so as not to cause stiffness  Patient Instructions     Follow up with PCP in 3-5 days  Proceed to  ER if symptoms worsen  Chief Complaint     Chief Complaint   Patient presents with    Hand Injury     C/O jamming right pinky finger , x a few weeks ago, x last night hit against something         History of Present Illness       66-year-old right-hand-dominant female with pertinent history of right upper extremity lymphedema secondary to axillary lymph node dissection, presents today with right pinky finger injury  Initially injured it about 2-3 weeks ago which eventually resolved with icing and rest   However, yesterday she hit her right finger against hard object which re-injured it causing persistent pain  Reports tenderness on the lateral aspect of the PIP and stiffness  Had swelling which is improved  Review of Systems   Review of Systems   Constitutional: Negative for chills and fever  HENT: Negative for congestion, rhinorrhea and sore throat  Respiratory: Negative for cough, shortness of breath and wheezing  Cardiovascular: Negative for chest pain  Gastrointestinal: Negative for abdominal pain and nausea  Musculoskeletal: Positive for arthralgias and joint swelling  Skin: Negative for rash  Neurological: Negative for dizziness and headaches           Current Medications       Current Outpatient Medications:     levothyroxine 25 mcg tablet, Take 25 mcg by mouth daily, Disp: , Rfl:     acetaminophen (TYLENOL) 325 mg tablet, Take 650 mg by mouth every 6 (six) hours as needed for mild pain, Disp: , Rfl:     Apple Cider Vinegar 500 MG TABS, Take 500 mg by mouth 2 (two) times a day, Disp: , Rfl:     Ascorbic Acid (VITAMIN C) 500 MG CAPS, Take by mouth, Disp: , Rfl:     cetirizine (ZyrTEC) 10 mg tablet, Take 10 mg by mouth as needed, Disp: , Rfl:     Cholecalciferol (Vitamin D3) 125 MCG (5000 UT) TABS, Take 5,000 Units by mouth daily, Disp: , Rfl:     Coenzyme Q10 (COQ-10) 10 MG CAPS, Take by mouth, Disp: , Rfl:     Cyanocobalamin (VITAMIN B 12 PO), Take by mouth, Disp: , Rfl:     diazepam (VALIUM) 5 mg tablet, TAKE 1 TABLET BY MOUTH EVERY 6 HOURS IF NEEDED FOR ANXIETY, Disp: 1 tablet, Rfl: 0    EPINEPHrine (EPIPEN) 0 3 mg/0 3 mL SOAJ, Inject 0 3 mL (0 3 mg total) into a muscle as needed for anaphylaxis, Disp: 1 mL, Rfl: 5    Flax OIL, Take by mouth, Disp: , Rfl:     Iron Combinations (IRON COMPLEX PO), Take by mouth, Disp: , Rfl:     lactobacillus acidophilus, Take by mouth, Disp: , Rfl:     levothyroxine 25 mcg tablet, Take 25 mcg by mouth every morning, Disp: , Rfl:     Magnesium Oxide 140 MG CAPS, Take by mouth, Disp: , Rfl:     patient supplied medication, Take 2 tablets by mouth daily in the early morning Super Beets nitric oxide , Disp: , Rfl:     pyridoxine (VITAMIN B6) 100 mg tablet, Take by mouth, Disp: , Rfl:     selenium 200 mcg, Take by mouth, Disp: , Rfl:     SUPER B COMPLEX/C PO, Take by mouth, Disp: , Rfl:     Vitamin E 400 units TABS, Take by mouth, Disp: , Rfl:     Current Allergies     Allergies as of 08/31/2022 - Reviewed 08/31/2022   Allergen Reaction Noted    Shellfish-derived products - food allergy Anaphylaxis 11/13/2013    Augmentin [amoxicillin-pot clavulanate]  01/14/2019    Codeine Other (See Comments) 01/31/2021    Hydrocodone-acetaminophen Vomiting 11/13/2013    Levofloxacin Nausea Only and Vomiting 12/03/2013            The following portions of the patient's history were reviewed and updated as appropriate: allergies, current medications, past family history, past medical history, past social history, past surgical history and problem list      Past Medical History:   Diagnosis Date    BRCA1 negative     BRCA2 negative     [facility] cancer ([facility] [address] [facility]     History of chemotherapy        Past Surgical History:   Procedure Laterality Date    HYSTERECTOMY      MASTECTOMY Right     1995    MASTECTOMY Right     OOPHORECTOMY      TONSILLECTOMY      TUMOR REMOVAL      mult tumor removals from age of 15 on       Family History   Problem Relation Age of Onset   Jullie Liming Brain cancer Mother     Lung cancer Father     Prostate cancer Father     Lung cancer Sister     Ovarian cancer Sister     No Known Problems Maternal Grandmother     No Known Problems Maternal Grandfather     Breast cancer Paternal Grandmother         unkown age   Jullie Liming No Known Problems Paternal Grandfather     No Known Problems Brother     No Known Problems Maternal Aunt     No Known Problems Maternal Uncle     No Known Problems Paternal Aunt     No Known Problems Paternal Uncle     ADD / ADHD Neg Hx     Anesthesia problems Neg Hx     Cancer Neg Hx     Clotting disorder Neg Hx     Collagen disease Neg Hx     Diabetes Neg Hx     Dislocations Neg Hx     Learning disabilities Neg Hx     Neurological problems Neg Hx     Osteoporosis Neg Hx     Rheumatologic disease Neg Hx     Scoliosis Neg Hx     Vascular Disease Neg Hx          Medications have been verified  Objective   /91   Pulse 77   Temp (!) 96 7 °F (35 9 °C)   Resp 18   Wt 93 9 kg (207 lb)   SpO2 97%   BMI 33 41 kg/m²   No LMP recorded  Patient is postmenopausal        Physical Exam     Physical Exam  Vitals and nursing note reviewed  Constitutional:       General: She is in acute distress  Appearance: Normal appearance  She is obese  She is not ill-appearing, toxic-appearing or diaphoretic  HENT:      Head: Normocephalic and atraumatic     Eyes: General:         Right eye: No discharge  Left eye: No discharge  Conjunctiva/sclera: Conjunctivae normal    Pulmonary:      Effort: Pulmonary effort is normal    Musculoskeletal:         General: Tenderness (Dorsal aspect of right 5th PIP), deformity ([place] nodules of the right 5th DIP) and signs of injury present  Skin:     General: Skin is warm  Findings: No erythema  Neurological:      General: No focal deficit present  Mental Status: She is alert and oriented to person, place, and time  Psychiatric:         Mood and Affect: Mood normal          Behavior: Behavior normal          Thought Content:  Thought content normal          Judgment: Judgment normal

## 2022-09-15 ENCOUNTER — HOSPITAL ENCOUNTER (OUTPATIENT)
Dept: RADIOLOGY | Facility: HOSPITAL | Age: 67
Discharge: HOME | End: 2022-09-15
Payer: MEDICARE

## 2022-09-15 DIAGNOSIS — Z13.820 SCREENING FOR OSTEOPOROSIS: ICD-10-CM

## 2022-09-15 DIAGNOSIS — Z78.0 MENOPAUSE: ICD-10-CM

## 2022-09-15 PROCEDURE — 77080 DXA BONE DENSITY AXIAL: CPT

## 2022-10-04 ENCOUNTER — OFFICE VISIT (OUTPATIENT)
Dept: VASCULAR SURGERY | Facility: CLINIC | Age: 67
End: 2022-10-04
Payer: MEDICARE

## 2022-10-04 VITALS
DIASTOLIC BLOOD PRESSURE: 74 MMHG | HEIGHT: 66 IN | BODY MASS INDEX: 32.78 KG/M2 | HEART RATE: 60 BPM | SYSTOLIC BLOOD PRESSURE: 126 MMHG | WEIGHT: 204 LBS

## 2022-10-04 DIAGNOSIS — I87.2 VENOUS INSUFFICIENCY (CHRONIC) (PERIPHERAL): Primary | ICD-10-CM

## 2022-10-04 PROCEDURE — 99212 OFFICE O/P EST SF 10 MIN: CPT | Performed by: SURGERY

## 2022-10-04 NOTE — ASSESSMENT & PLAN NOTE
ANNI 7/6/22  Patient with bilateral lower extremity venous insufficiency marked by multiple varicosities at the lateral thighs and ankles bilaterally  Patient has been wearing compression stocking therapy since October of 2020  Venous valvular insufficiency study performed  reveals incompetence of the great saphenous veins bilaterally, without deep venous insufficiency  On the left there is incompetence of the small saphenous vein  Patient tells me the compression stocking therapy has provided benefit    I encouraged her to continue with stocking use, moderate exercise and leg elevation while at rest

## 2022-10-04 NOTE — PROGRESS NOTES
Assessment/Plan:    Venous insufficiency (chronic) (peripheral)  [facility] 7/6/22  Patient with bilateral lower extremity venous insufficiency marked by multiple varicosities at the lateral thighs and ankles bilaterally  Patient has been wearing compression stocking therapy since October of 2020  Venous valvular insufficiency study performed  reveals incompetence of the great saphenous veins bilaterally, without deep venous insufficiency  On the left there is incompetence of the small saphenous vein  Patient tells me the compression stocking therapy has provided benefit  I encouraged her to continue with stocking use, moderate exercise and leg elevation while at rest           Diagnoses and all orders for this visit:    Venous insufficiency (chronic) (peripheral)          Subjective:      Patient ID: Herlinda Lan is a 79 y o  female  Pt is here to rev [facility] 7/6/22  Pt c/o stephen aching VV on thigh to calfs  Pt wears compression socks  Pt is here to review [facility] 7/6/22  Patient with bilateral lower extremity venous insufficiency marked by multiple varicosities at the lateral thighs and ankles bilaterally  Patient has been wearing compression stocking therapy since October of 2020  Venous valvular insufficiency study performed  reveals incompetence of the great saphenous veins bilaterally, without deep venous insufficiency  On the left there is incompetence of the small saphenous vein  Patient tells me the compression stocking therapy has provided benefit  I encouraged her to continue with stocking use, moderate exercise and leg elevation while at rest          The following portions of the patient's history were reviewed and updated as appropriate: allergies, current medications, past family history, past medical history, past social history, past surgical history and problem list     Review of Systems   Constitutional: Negative  HENT: Negative  Eyes: Negative  Respiratory: Negative  Cardiovascular: Negative  Gastrointestinal: Negative  Endocrine: Negative  Genitourinary: Negative  Musculoskeletal: Negative  Skin: Negative  Allergic/Immunologic: Negative  Neurological: Negative  Hematological: Negative  Psychiatric/Behavioral: Negative  Objective:      /74 (BP Location: Left arm, Patient Position: Sitting, Cuff Size: Large)   Pulse 60   Ht 5' 6" (1 676 m)   Wt 92 5 kg (204 lb)   BMI 32 93 kg/m²          Physical Exam  Vitals and nursing note reviewed  Constitutional:       Appearance: She is well-developed  HENT:      Head: Normocephalic and atraumatic  Eyes:      Conjunctiva/sclera: Conjunctivae normal       Pupils: Pupils are equal, round, and reactive to light  Neck:      Vascular: No JVD  Cardiovascular:      Heart sounds: Normal heart sounds  Pulmonary:      Effort: No respiratory distress  Breath sounds: No stridor  No wheezing or rales  Chest:      Chest wall: No tenderness  Abdominal:      General: There is no distension  Palpations: There is no mass  Tenderness: There is no abdominal tenderness  There is no guarding or rebound  Musculoskeletal:         General: No tenderness or deformity  Normal range of motion  Cervical back: Normal range of motion and neck supple  Skin:     General: Skin is warm and dry  Findings: No erythema or rash  Comments: Soft and supple, no lipodermatosclerosis, no significant hemosiderin deposition   Neurological:      Mental Status: She is alert and oriented to person, place, and time  Psychiatric:         Behavior: Behavior normal          Thought Content:  Thought content normal

## 2022-10-04 NOTE — LETTER
October 4, 2022     Laurita Nunez DO    Patient: Yoselyn Carbajal   YOB: 1955   Date of Visit: 10/4/2022       Dear Dr Reagan Kwan:    Thank you for referring Patrick Bailey to me for evaluation  Below are the relevant portions of my assessment and plan of care  ANNI 7/6/22  Patient with bilateral lower extremity venous insufficiency marked by multiple varicosities at the lateral thighs and ankles bilaterally  Patient has been wearing compression stocking therapy since October of 2020  Venous valvular insufficiency study performed  reveals incompetence of the great saphenous veins bilaterally, without deep venous insufficiency  On the left there is incompetence of the small saphenous vein  Patient tells me the compression stocking therapy has provided benefit  I encouraged her to continue with stocking use, moderate exercise and leg elevation while at rest        If you have questions, please do not hesitate to call me  I look forward to following Candance Breaker along with you           Sincerely,        Renetta Flanagan MD        CC: No Recipients

## 2022-11-02 ENCOUNTER — OFFICE VISIT (OUTPATIENT)
Dept: OBGYN CLINIC | Facility: CLINIC | Age: 67
End: 2022-11-02

## 2022-11-02 ENCOUNTER — APPOINTMENT (OUTPATIENT)
Dept: RADIOLOGY | Facility: CLINIC | Age: 67
End: 2022-11-02

## 2022-11-02 VITALS
SYSTOLIC BLOOD PRESSURE: 148 MMHG | HEIGHT: 66 IN | BODY MASS INDEX: 32.78 KG/M2 | DIASTOLIC BLOOD PRESSURE: 80 MMHG | WEIGHT: 204 LBS | HEART RATE: 80 BPM

## 2022-11-02 DIAGNOSIS — M79.671 PAIN IN RIGHT FOOT: ICD-10-CM

## 2022-11-02 DIAGNOSIS — M72.2 PLANTAR FASCIITIS: Primary | ICD-10-CM

## 2022-11-02 NOTE — PROGRESS NOTES
Assessment/Plan:  1  Plantar fasciitis  XR foot 3+ vw right       Alton Morel has plantar fasciitis in the right foot and has already tried conservative measures of stretching icing and modifying her footwear  I recommended night splinting for the next 4-6 weeks which she is done in the past   She also could consider formal physical therapy  She verbalized understanding this plan will follow-up if pain persists beyond the next 6 weeks  Subjective:   Jessica Scott is a 79 y o  female who presents to the office for evaluation for right-sided foot pain  She denies any recent injury or trauma  She is a history of plantar fasciitis in the right foot has been having severely increased discomfort over the plantar and medial aspect of the right foot  She has been doing home exercises that she learned in the past and this is not been helping her significantly  She denies any swelling or bruising  She has done night splint in the past but has not tried this recently as she does not have the splint a longer  Review of Systems   Constitutional: Negative for chills, fever and unexpected weight change  HENT: Negative for hearing loss, nosebleeds and sore throat  Eyes: Negative for pain, redness and visual disturbance  Respiratory: Negative for cough, shortness of breath and wheezing  Cardiovascular: Negative for chest pain, palpitations and leg swelling  Gastrointestinal: Negative for abdominal pain, nausea and vomiting  Endocrine: Negative for polydipsia and polyuria  Genitourinary: Negative for dysuria and hematuria  Musculoskeletal:        See HPI   Skin: Negative for rash and wound  Neurological: Negative for dizziness, numbness and headaches  Psychiatric/Behavioral: Negative for decreased concentration and suicidal ideas  The patient is not nervous/anxious            Past Medical History:   Diagnosis Date   • BRCA1 negative    • BRCA2 negative    • [facility]    • History of chemotherapy        Past Surgical History:   Procedure Laterality Date   • HYSTERECTOMY     • MASTECTOMY Right     1995   • MASTECTOMY Right    • OOPHORECTOMY     • TONSILLECTOMY     • TUMOR REMOVAL      mult tumor removals from age of 15 on       Family History   Problem Relation Age of Onset   • Brain cancer Mother    • Lung cancer Father    • Prostate cancer Father    • Lung cancer Sister    • Ovarian cancer Sister    • No Known Problems Maternal Grandmother    • No Known Problems Maternal Grandfather    • Breast cancer Paternal Grandmother         unkown age   • No Known Problems Paternal Grandfather    • No Known Problems Brother    • No Known Problems Maternal Aunt    • No Known Problems Maternal Uncle    • No Known Problems Paternal Aunt    • No Known Problems Paternal Uncle    • ADD / ADHD Neg Hx    • Anesthesia problems Neg Hx    • Cancer Neg Hx    • Clotting disorder Neg Hx    • Collagen disease Neg Hx    • Diabetes Neg Hx    • Dislocations Neg Hx    • Learning disabilities Neg Hx    • Neurological problems Neg Hx    • Osteoporosis Neg Hx    • Rheumatologic disease Neg Hx    • Scoliosis Neg Hx    • Vascular Disease Neg Hx        Social History     Occupational History   • Not on file   Tobacco Use   • Smoking status: Never Smoker   • Smokeless tobacco: Never Used   Vaping Use   • Vaping Use: Never used   Substance and Sexual Activity   • Alcohol use: No   • Drug use: No   • Sexual activity: Yes     Partners: Male     Comment: rare         Current Outpatient Medications:   •  Apple Cider Vinegar 500 MG TABS, Take 500 mg by mouth 2 (two) times a day, Disp: , Rfl:   •  Ascorbic Acid (VITAMIN C) 500 MG CAPS, Take by mouth, Disp: , Rfl:   •  Cholecalciferol (Vitamin D3) 125 MCG (5000 UT) TABS, Take 5,000 Units by mouth daily, Disp: , Rfl:   •  Coenzyme Q10 (COQ-10) 10 MG CAPS, Take by mouth, Disp: , Rfl:   •  Cyanocobalamin (VITAMIN B 12 PO), Take by mouth, Disp: , Rfl:   •  EPINEPHrine (EPIPEN) 0 3 mg/0 3 mL SOAJ, Inject 0 3 mL (0 3 mg total) into a muscle as needed for anaphylaxis, Disp: 1 mL, Rfl: 5  •  Flax OIL, Take by mouth, Disp: , Rfl:   •  Iron Combinations (IRON COMPLEX PO), Take by mouth, Disp: , Rfl:   •  lactobacillus acidophilus, Take by mouth, Disp: , Rfl:   •  levothyroxine 25 mcg tablet, Take 25 mcg by mouth every morning, Disp: , Rfl:   •  Magnesium Oxide 140 MG CAPS, Take by mouth, Disp: , Rfl:   •  acetaminophen (TYLENOL) 325 mg tablet, Take 650 mg by mouth every 6 (six) hours as needed for mild pain (Patient not taking: No sig reported), Disp: , Rfl:   •  cetirizine (ZyrTEC) 10 mg tablet, Take 10 mg by mouth as needed (Patient not taking: No sig reported), Disp: , Rfl:   •  diazepam (VALIUM) 5 mg tablet, TAKE 1 TABLET BY MOUTH EVERY 6 HOURS IF NEEDED FOR ANXIETY (Patient not taking: No sig reported), Disp: 1 tablet, Rfl: 0  •  levothyroxine 25 mcg tablet, Take 25 mcg by mouth daily, Disp: , Rfl:   •  patient supplied medication, Take 2 tablets by mouth daily in the early morning Super Beets nitric oxide , Disp: , Rfl:   •  pyridoxine (VITAMIN B6) 100 mg tablet, Take by mouth, Disp: , Rfl:   •  selenium 200 mcg, Take by mouth, Disp: , Rfl:   •  SUPER B COMPLEX/C PO, Take by mouth, Disp: , Rfl:   •  Vitamin E 400 units TABS, Take by mouth, Disp: , Rfl:     Allergies   Allergen Reactions   • Shellfish-Derived Products - Food Allergy Anaphylaxis   • Augmentin [Amoxicillin-Pot Clavulanate]    • Codeine Other (See Comments)     " I can't function "   • Hydrocodone-Acetaminophen Vomiting   • Levofloxacin Nausea Only and Vomiting       Objective:  Vitals:    11/02/22 1109   BP: 148/80   Pulse: 80       Ortho Exam    Physical Exam  Vitals and nursing note reviewed  Constitutional:       Appearance: She is well-developed  HENT:      Head: Normocephalic and atraumatic  Eyes:      General: No scleral icterus  Extraocular Movements: Extraocular movements intact        Conjunctiva/sclera: Conjunctivae normal    Cardiovascular:      Rate and Rhythm: Normal rate  Pulmonary:      Effort: Pulmonary effort is normal  No respiratory distress  Musculoskeletal:      Cervical back: Normal range of motion and neck supple  Feet:       Comments: As noted in HPI   Feet:      Comments: Tenderness over calcaneal insertion of plantar fascia  Skin:     General: Skin is warm and dry  Neurological:      Mental Status: She is alert and oriented to person, place, and time  Psychiatric:         Behavior: Behavior normal          I have personally reviewed pertinent films in PACS and my interpretation is as follows:  X-rays of the right foot demonstrate a large calcaneal spur without evidence of acute fracture      This document was created using speech voice recognition software  Grammatical errors, random word insertions, pronoun errors, and incomplete sentences are an occasional consequence of this system due to software limitations, ambient noise, and hardware issues  Any formal questions or concerns about content, text, or information contained within the body of this dictation should be directly addressed to the provider for clarification

## 2022-11-28 ENCOUNTER — APPOINTMENT (OUTPATIENT)
Dept: LAB | Facility: CLINIC | Age: 67
End: 2022-11-28

## 2022-11-28 DIAGNOSIS — E03.9 ACQUIRED HYPOTHYROIDISM: ICD-10-CM

## 2022-11-28 DIAGNOSIS — E78.5 DYSLIPIDEMIA: ICD-10-CM

## 2022-11-28 LAB
ALBUMIN SERPL BCP-MCNC: 3.5 G/DL (ref 3.5–5)
ALP SERPL-CCNC: 73 U/L (ref 46–116)
ALT SERPL W P-5'-P-CCNC: 26 U/L (ref 12–78)
ANION GAP SERPL CALCULATED.3IONS-SCNC: 4 MMOL/L (ref 4–13)
AST SERPL W P-5'-P-CCNC: 22 U/L (ref 5–45)
BILIRUB SERPL-MCNC: 0.65 MG/DL (ref 0.2–1)
BUN SERPL-MCNC: 17 MG/DL (ref 5–25)
CALCIUM SERPL-MCNC: 9.1 MG/DL (ref 8.3–10.1)
CHLORIDE SERPL-SCNC: 106 MMOL/L (ref 96–108)
CHOLEST SERPL-MCNC: 225 MG/DL
CO2 SERPL-SCNC: 29 MMOL/L (ref 21–32)
CREAT SERPL-MCNC: 0.7 MG/DL (ref 0.6–1.3)
GFR SERPL CREATININE-BSD FRML MDRD: 89 ML/MIN/1.73SQ M
GLUCOSE P FAST SERPL-MCNC: 93 MG/DL (ref 65–99)
HDLC SERPL-MCNC: 82 MG/DL
LDLC SERPL CALC-MCNC: 133 MG/DL (ref 0–100)
NONHDLC SERPL-MCNC: 143 MG/DL
POTASSIUM SERPL-SCNC: 4.1 MMOL/L (ref 3.5–5.3)
PROT SERPL-MCNC: 7.9 G/DL (ref 6.4–8.4)
SODIUM SERPL-SCNC: 139 MMOL/L (ref 135–147)
TRIGL SERPL-MCNC: 48 MG/DL
TSH SERPL DL<=0.05 MIU/L-ACNC: 6.35 UIU/ML (ref 0.45–4.5)

## 2022-12-02 ENCOUNTER — OFFICE VISIT (OUTPATIENT)
Dept: CARDIOLOGY CLINIC | Facility: CLINIC | Age: 67
End: 2022-12-02

## 2022-12-02 VITALS
OXYGEN SATURATION: 100 % | DIASTOLIC BLOOD PRESSURE: 80 MMHG | HEART RATE: 64 BPM | TEMPERATURE: 97.8 F | HEIGHT: 66 IN | BODY MASS INDEX: 32.95 KG/M2 | SYSTOLIC BLOOD PRESSURE: 166 MMHG | WEIGHT: 205 LBS

## 2022-12-02 DIAGNOSIS — E78.5 DYSLIPIDEMIA: ICD-10-CM

## 2022-12-02 DIAGNOSIS — I49.3 ASYMPTOMATIC PVCS: ICD-10-CM

## 2022-12-02 DIAGNOSIS — I10 ESSENTIAL HYPERTENSION: ICD-10-CM

## 2022-12-02 DIAGNOSIS — I10 UNCONTROLLED HYPERTENSION: Primary | ICD-10-CM

## 2022-12-02 DIAGNOSIS — E66.9 CLASS 1 OBESITY: ICD-10-CM

## 2022-12-02 DIAGNOSIS — Z79.899 HIGH RISK MEDICATION USE: ICD-10-CM

## 2022-12-02 DIAGNOSIS — I87.2 VENOUS INSUFFICIENCY OF BOTH LOWER EXTREMITIES: ICD-10-CM

## 2022-12-02 DIAGNOSIS — I49.8 VENTRICULAR TRIGEMINY: ICD-10-CM

## 2022-12-02 DIAGNOSIS — R07.89 ATYPICAL CHEST PAIN: ICD-10-CM

## 2022-12-02 DIAGNOSIS — E03.9 ACQUIRED HYPOTHYROIDISM: ICD-10-CM

## 2022-12-02 RX ORDER — ACETAMINOPHEN 325 MG/1
650 TABLET ORAL EVERY 6 HOURS PRN
Qty: 100 TABLET | Refills: 5
Start: 2022-12-02

## 2022-12-02 RX ORDER — DIAZEPAM 5 MG/1
5 TABLET ORAL EVERY 6 HOURS PRN
Qty: 30 TABLET | Refills: 5
Start: 2022-12-02

## 2022-12-02 NOTE — PATIENT INSTRUCTIONS
Your average blood pressure at home between 10/24 and 10/27/2022 was 138/81 with goal of 129/79 or less  We will see increased physical activity along with stabilization of thyroid function improves the the blood pressure and cholesterol as well  Increase levothyroxine to 1-1/2 tablets daily (37 5 mcg)  Notify your primary care physician if you are running low on your supply, as you will be using an extra 15 tablets per month with this dose increase  Recommend recheck of TSH level in 2-3 months  Continue current medication, otherwise  Cardiology follow-up approximately six months with rhythm strip, lipid panel, TSH, CMP

## 2022-12-04 PROBLEM — I10 ESSENTIAL HYPERTENSION: Status: ACTIVE | Noted: 2022-12-04

## 2022-12-04 PROBLEM — I49.8 VENTRICULAR TRIGEMINY: Status: ACTIVE | Noted: 2022-12-04

## 2022-12-04 PROBLEM — Z79.899 HIGH RISK MEDICATION USE: Status: ACTIVE | Noted: 2022-12-04

## 2022-12-05 NOTE — PROGRESS NOTES
Office Cardiology Progress Note  Herlinda Lan 79 y o  female MRN: [id number]  12/04/22  9:12 PM      ASSESSMENT:    1   Recent exacerbation of symptomatic frequent PVCs with palpitations, with documented ventricular trigeminy on last two EKG's and on current exam, mainly at rest, beginning on 06/04/2022, with chronic PVCs likely present for  approximately 2+ years   Previous Holter monitor on 02/08/2021 showed 10 8% PVC density, no complex ventricular dysrhythmias, and rare atrial ectopy  Similar findings noted on 06/24/2022 Holter monitor  2   History of uncontrolled essential hypertension, diagnosed on 01/31/2021 and reconfirmed on last office visit  with average home blood pressure reading of 139/85 between 2/5 and 02/08/2021 and again elevated to 138/81 between 10/24 and 10/27/2022  Today's office blood pressure is 166/80   Recent average blood pressure at home between 6/4 and 06/08/2022 was mildly elevated at 140/80 with target of 129/79 or less  3  Mild acquired hypothyroidism with mildly elevated TSH level as of 01/31/2021, and again on 06/21/2022 which could be a contributing factor to her obesity and dyslipidemia and hypertension  Therapy deemed unnecessary by primary care physician, but I believe it may be helpful  Patient currently feeling much better with low-dose levothyroxine despite no improvement in recent TSH level of 11/28/2022   4  Chronic dyslipidemia, worsened  has 8 4% 10 year ASCVD risk, which is intermediate  5  Chronic obesity, class 1 , with 2 6  pound weight gain in approximately   1+ year and 6 6 pound weight gain in approximately 1 7 years  6   Suppressed atypical chest discomfort described as substernal pressure associated with palpitation, nonexertional with CAD  likely excluded with normal stress test on  02/10/2021   7  History of treated remote right breast cancer, treated with combination of radical right mastectomy and lymphadenectomy plus chemotherapy    8  Chronic bilateral varicose veins and superficial venous insufficiency of both lower extremities, controlled with support hosiery,  and previously increased discomfort in lower extremities  9  Low cardiac risk for colonoscopy on 06/13/2022, but anesthesiology will need to be warned about patient's frequent PVCs     it is not clear to me that patient ever had colonoscopy  There is no record of any [facility] colonoscopy  Plan       Patient Instructions     1  Your average blood pressure at home between 10/24 and 10/27/2022 was 138/81 with goal of 129/79 or less  We will see if increased physical activity along with stabilization of thyroid function improves the the blood pressure and cholesterol as well  2  Increase levothyroxine to 1-1/2 tablets daily (37 5 mcg)  Notify your primary care physician if you are running low on your supply, as you will be using an extra 15 tablets per month with this dose increase  3  Recommend recheck of TSH level in 2-3 months  4  Continue current medication, otherwise  5  Cardiology follow-up approximately six months with rhythm strip, lipid panel, TSH, CMP  HPI    This 79 y o  female  denies new cardiopulmonary and medical symptoms  The patient claims to have a much better energy level, diminished hot flashes, and decreased fragility of her fingernails since starting on low-dose levothyroxine  The patient  has had some issues with plantar fasciitis, which are now improved  She is also stressed because of her need to be a   caretaker for her , who has slowly progressive dementia  The patient was seen by vascular surgery on 10/04/2022 and was told to continue on compression hosiery with moderate exercise and leg elevation  The patient's home blood pressure readings between 10/24 and 10/27/2022 averaged 138/81  The patient is also being seen in follow-up of the below listed diagnoses  Encounter Diagnoses   Name Primary?    • Uncontrolled hypertension Yes • Essential hypertension    • Ventricular trigeminy    • Asymptomatic PVCs    • Acquired hypothyroidism    • Dyslipidemia    • Atypical chest pain    • Venous insufficiency of both lower extremities    • High risk medication use    • Class 1 obesity         Review of Systems    All other systems negative, except as noted in history of present illness    Historical Information   Past Medical History:   Diagnosis Date   • BRCA1 negative    • BRCA2 negative    • Breast cancer ([facility] [address] )    • History of chemotherapy      Past Surgical History:   Procedure Laterality Date   • HYSTERECTOMY     • MASTECTOMY Right     1995   • MASTECTOMY Right    • OOPHORECTOMY     • TONSILLECTOMY     • TUMOR REMOVAL      mult tumor removals from age of 15 on     Social History     Substance and Sexual Activity   Alcohol Use No     Social History     Substance and Sexual Activity   Drug Use No     Social History     Tobacco Use   Smoking Status Never   Smokeless Tobacco Never       Family History:  Family History   Problem Relation Age of Onset   • Brain cancer Mother    • Lung cancer Father    • Prostate cancer Father    • Lung cancer Sister    • Ovarian cancer Sister    • No Known Problems Maternal Grandmother    • No Known Problems Maternal Grandfather    • Breast cancer Paternal Grandmother         unkown age   • No Known Problems Paternal Grandfather    • No Known Problems Brother    • No Known Problems Maternal Aunt    • No Known Problems Maternal Uncle    • No Known Problems Paternal Aunt    • No Known Problems Paternal Uncle    • ADD / ADHD Neg Hx    • Anesthesia problems Neg Hx    • Cancer Neg Hx    • Clotting disorder Neg Hx    • Collagen disease Neg Hx    • Diabetes Neg Hx    • Dislocations Neg Hx    • Learning disabilities Neg Hx    • Neurological problems Neg Hx    • Osteoporosis Neg Hx    • Rheumatologic disease Neg Hx    • Scoliosis Neg Hx    • Vascular Disease Neg Hx          Meds/Allergies     Prior to Admission medications Medication Sig Start Date End Date Taking?  Authorizing Provider   acetaminophen (TYLENOL) 325 mg tablet Take 2 tablets (650 mg total) by mouth every 6 (six) hours as needed for mild pain 12/2/22  Yes Claude Leaf, MD   Apple Cider Vinegar 500 MG TABS Take 500 mg by mouth 2 (two) times a day   Yes Historical Provider, MD   Ascorbic Acid (VITAMIN C) 500 MG CAPS Take by mouth   Yes Historical Provider, MD   Cholecalciferol (Vitamin D3) 125 MCG (5000 UT) TABS Take 5,000 Units by mouth daily   Yes Historical Provider, MD   Coenzyme Q10 (COQ-10) 10 MG CAPS Take by mouth   Yes Historical Provider, MD   Cyanocobalamin (VITAMIN B 12 PO) Take by mouth   Yes Historical Provider, MD   diazepam (VALIUM) 5 mg tablet Take 1 tablet (5 mg total) by mouth every 6 (six) hours as needed for anxiety 12/2/22  Yes Claude Leaf, MD   EPINEPHrine (EPIPEN) 0 3 mg/0 3 mL SOAJ Inject 0 3 mL (0 3 mg total) into a muscle as needed for anaphylaxis 10/27/21  Yes Claude Leaf, MD   Flax OIL Take by mouth   Yes Historical Provider, MD   Iron Combinations (IRON COMPLEX PO) Take by mouth   Yes Historical Provider, MD   lactobacillus acidophilus Take by mouth   Yes Historical Provider, MD   levothyroxine 25 mcg tablet Take 37 5 mcg by mouth every morning Take 1-1/2 tablets by mouth every morning 7/30/22  Yes Historical Provider, MD   Magnesium Oxide 140 MG CAPS Take by mouth   Yes Historical Provider, MD   patient supplied medication Take 2 tablets by mouth daily in the early morning [employer] nitric oxide    Yes Historical Provider, MD   pyridoxine (VITAMIN B6) 100 mg tablet Take by mouth   Yes Historical Provider, MD   selenium 200 mcg Take by mouth   Yes Historical Provider, MD   SUPER B COMPLEX/C PO Take by mouth   Yes Historical Provider, MD   Vitamin E 400 units TABS Take by mouth   Yes Historical Provider, MD       Allergies   Allergen Reactions   • Shellfish-Derived Products - Food Allergy Anaphylaxis   • Augmentin [Amoxicillin-Pot Clavulanate]    • Codeine Other (See Comments)     " I can't function "   • Hydrocodone-Acetaminophen Vomiting   • Levofloxacin Nausea Only and Vomiting         Vitals:    12/02/22 1113   BP: 166/80   BP Location: Left arm   Patient Position: Sitting   Cuff Size: Large   Pulse: 64   Temp: 97 8 °F (36 6 °C)   SpO2: 100%   Weight: 93 kg (205 lb)   Height: 5' 6" (1 676 m)       Body mass index is 33 09 kg/m²  1 pound weight loss in approximately five months    Physical Exam:    General Appearance:  Alert, cooperative, no distress, appears stated age and is mildly to moderately obese  Head:  Normocephalic, without obvious abnormality, atraumatic   Eyes:  PERRL, conjunctiva/corneas clear, EOM's intact,   both eyes   Ears:  Normal TM's and external ear canals, both ears   Nose: Nares normal, septum midline, mucosa normal, no drainage or sinus tenderness   Throat: Lips, mucosa, and tongue normal; teeth and gums normal   Neck: Supple, symmetrical, trachea midline, no adenopathy, thyroid: not enlarged, symmetric, no tenderness/mass/nodules, no carotid bruit or JVD   Back:   Symmetric, no curvature, ROM normal, no CVA tenderness   Lungs:   Clear to auscultation bilaterally, respirations unlabored   Chest Wall:  No tenderness or deformity   Heart:  Frequent extrasystoles with trigeminal pattern, S1, S2 normal, no murmur, rub or gallop   Abdomen:   Soft, non-tender, bowel sounds active all four quadrants,  no masses, no organomegaly  Moderate abdominal obesity     Extremities: Extremities reveal bilateral varicose veins, atraumatic, no cyanosis or edema   Pulses: 2+ and symmetric   Skin: Skin showed normal color, texture, turgor and no rashes or lesions   Lymph nodes: Cervical, supraclavicular, and axillary nodes normal   Neurologic: Normal         Cardiographics    ECG 12/04/22:    Normal sinus rhythm at 64 bpm     Frequent PVCs in pattern of ventricular trigeminy with likely RV configuration   Moderate  LVH criteria Poor R-wave progression   No significant change from 06/09/2022    24 hour Holter monitor 06/24/2022:    Frequent ventricular ectopy with no SVT, VT, atrial fibrillation /flutter, pauses, heart blocks  Symptoms correlated with normal sinus rhythm and ventricular trigeminy  IMAGING:    No Chest XR results available for this patient      Lower extremity venous reflux study 08/02/2022:    Superficial venous reflux bilaterally, left more than right  Current study indicates slight worsening of venous reflux in left lower extremity compared to previous study of 11/12/2020      LAB REVIEW:      Lab Results   Component Value Date    SODIUM 139 11/28/2022    K 4 1 11/28/2022     11/28/2022    CO2 29 11/28/2022    ANIONGAP 12 12/05/2013    BUN 17 11/28/2022    CREATININE 0 70 11/28/2022    GLUCOSE 101 12/05/2013    GLUF 93 11/28/2022    CALCIUM 9 1 11/28/2022    CORRECTEDCA 9 0 06/21/2022    AST 22 11/28/2022    ALT 26 11/28/2022    ALKPHOS 73 11/28/2022    PROT 7 5 11/13/2013    BILITOT 0 14 (L) 11/13/2013    EGFR 89 11/28/2022     Lab Results   Component Value Date    CHOLESTEROL 225 (H) 11/28/2022    CHOLESTEROL 218 (H) 06/21/2022    CHOLESTEROL 203 (H) 02/12/2021     Lab Results   Component Value Date    HDL 82 11/28/2022    HDL 64 06/21/2022    HDL 75 02/12/2021       Lab Results   Component Value Date    LDLCALC 133 (H) 11/28/2022    LDLCALC 145 (H) 06/21/2022    LDLCALC 120 (H) 02/12/2021     No components found for: [facility]  Lab Results   Component Value Date    TRIG 48 11/28/2022    TRIG 46 06/21/2022    TRIG 38 02/12/2021        8 4% 10 year ASCVD risk, intermediate   TSH 11/28/2022:  6 350          Desmond Marin MD

## 2023-01-05 ENCOUNTER — OFFICE VISIT (OUTPATIENT)
Dept: OTOLARYNGOLOGY | Facility: CLINIC | Age: 68
End: 2023-01-05

## 2023-01-05 VITALS — WEIGHT: 198 LBS | TEMPERATURE: 97.8 F | HEIGHT: 66 IN | BODY MASS INDEX: 31.82 KG/M2

## 2023-01-05 DIAGNOSIS — H61.23 BILATERAL IMPACTED CERUMEN: ICD-10-CM

## 2023-01-05 DIAGNOSIS — H93.8X2 CLOGGED EAR, LEFT: Primary | ICD-10-CM

## 2023-01-05 NOTE — PROGRESS NOTES
Assessment/Plan:  Cerumen removed bilaterally  F/u 6 months  Diagnosis ICD-10-CM Associated Orders   1  Clogged ear, left  H93 8X2       2  Bilateral impacted cerumen  H61 23              Subjective:      Patient ID: Camelia Alfonso is a 79 y o  female  Pt presents with c/o clogged left ear  The following portions of the patient's history were reviewed and updated as appropriate: allergies, current medications, past family history, past medical history, past social history, past surgical history and problem list     Review of Systems      Objective:      Temp 97 8 °F (36 6 °C) (Temporal)   Ht 5' 6" (1 676 m)   Wt 89 8 kg (198 lb)   BMI 31 96 kg/m²          Physical Exam  Constitutional:       Appearance: She is well-developed  HENT:      Head: Normocephalic and atraumatic  Right Ear: Tympanic membrane, ear canal and external ear normal  No drainage  No middle ear effusion  There is impacted cerumen  Left Ear: Tympanic membrane, ear canal and external ear normal  No drainage  No middle ear effusion  There is impacted cerumen  Nose: Septal deviation present  Mouth/Throat:      Pharynx: Uvula midline  No oropharyngeal exudate  Tonsils: 2+ on the right  2+ on the left  Neck:      Thyroid: No thyroid mass or thyromegaly  Trachea: Trachea normal  No tracheal deviation  Lymphadenopathy:      Cervical: No cervical adenopathy  Neurological:      Mental Status: She is alert

## 2023-01-20 NOTE — ASSESSMENT & PLAN NOTE
Operative Note    Patient: Savannah Landau  YOB: 1936  MRN: [id number]    Date of Procedure: 1/6/2023       PREOPERATIVE DIAGNOSIS:  Right groin hernia      POSTOPERATIVE DIAGNOSIS:  Right groin pantaloon inguinal hernia      PROCEDURES:  Reduction and repair of inguinal hernia with keyhole Marlex mesh       SURGEON:  CONSTANZA Nunez      ASSISTANT:  Hiram Mcknight      ANESTHESIA:  General       EBL:  Less than 25 ml      DRAINS:  None      SPECIMENS:  Hernia sac      OPERATIVE PROCEDURE:    The patient was placed in the supine position. General anesthesia was administered. IV antibiotics were given. Timeout was observed. The right groin was prepped and sterilely draped. The right groin was addressed by making an oblique skin incision. Dissection was sharply carried down through Camper's and Nimo's fascia. The aponeurosis of the external abdominal oblique was opened. The ilioinguinal nerve was identified and preserved. The spermatic cord was immobilized. The pubic tubercle was mobilized laterally to the deep inguinal ring. Dissection about the anterior/medial aspect of the spermatic cord allowed for identification of an indirect hernia sac. This sac was taken up to the deep inguinal ring and then ligated with a 0 Prolene ligature. The sac was excised. The ileo pubic floor was disruptive representing a direct inguinal hernia. The transversalis fascia was re approximated to Poupart's ligament along the inguinal ligament with interrupted 0 Prolene. A keyhole Marlex mesh was then placed about the ileo pubic floor. The tails of the mesh were brought around the cord and around the deep inguinal ring. The mesh was sewn in place with multiple segments of 0 Prolene. Flushing maneuvers were performed. The ilioinguinal nerve was allowed to return to it's normal resting position. The aponeurosis of the external abdominal oblique was closed with running 2-0 Monocryl.   Camper's and Symptoms predominate at the left ankle  These varicosities would be best served with sclerotherapy  I have given her the name and contact information for providers that perform sclerotherapy    Patient instructed to continue with compression stocking use, moderate exercise and leg elevation while at rest  Nimo's fascia were closed with 2-0 and 3-0 Monocryl. The skin incision was close with running subcuticular 4-0 Monocryl. Steri strips were placed over the incision site. Dermabond was placed about the steri strips. The patient tolerated the procedure well. He was extubated and transported from the operating room to the recovery room in stable condition. Specimens:   ID Type Source Tests Collected by Time Destination   1 : Right inguinal hernia sac Preservative Inguinal  Bladimir Hayden MD 1/6/2023 7357 Pathology        Implants:   Implant Name Type Inv.  Item Serial No.  Lot No. LRB No. Used Action   MESH MARK [id number] INGUINAL POLYPR PRESHAPED SPERMATIC CRD - [id number]  Christian A 379 PRESHAPED SPERMATIC CRD  BARD DAVOL_ [id number] Right 1 Implanted         Electronically Signed by Susana Lopez MD on 1/20/2023 at 12:53 PM

## 2023-04-03 ENCOUNTER — LAB (OUTPATIENT)
Dept: LAB | Facility: CLINIC | Age: 68
End: 2023-04-03

## 2023-04-03 DIAGNOSIS — E03.9 ACQUIRED HYPOTHYROIDISM: ICD-10-CM

## 2023-04-03 LAB — TSH SERPL DL<=0.05 MIU/L-ACNC: 3.26 UIU/ML (ref 0.45–4.5)

## 2023-04-04 ENCOUNTER — TELEPHONE (OUTPATIENT)
Dept: CARDIOLOGY CLINIC | Facility: CLINIC | Age: 68
End: 2023-04-04

## 2023-04-04 NOTE — RESULT ENCOUNTER NOTE
Defy patient that TSH on 4/3/2023 was normal, compared to 4 months ago when it was significantly elevated  Based on that, I recommend she continue the same dose of the thyroid medication      Dr Omid Grossman

## 2023-04-04 NOTE — TELEPHONE ENCOUNTER
----- Message from Gadiel Brewer MD sent at 4/3/2023  8:25 PM EDT -----  Defy patient that TSH on 4/3/2023 was normal, compared to 4 months ago when it was significantly elevated  Based on that, I recommend she continue the same dose of the thyroid medication      Dr Omid Grossman

## 2023-08-11 ENCOUNTER — APPOINTMENT (OUTPATIENT)
Dept: LAB | Facility: CLINIC | Age: 68
End: 2023-08-11
Payer: MEDICARE

## 2023-08-11 DIAGNOSIS — E03.9 ACQUIRED HYPOTHYROIDISM: ICD-10-CM

## 2023-08-11 DIAGNOSIS — E66.9 CLASS 1 OBESITY: ICD-10-CM

## 2023-08-11 DIAGNOSIS — I10 ESSENTIAL HYPERTENSION: ICD-10-CM

## 2023-08-11 DIAGNOSIS — Z79.899 HIGH RISK MEDICATION USE: ICD-10-CM

## 2023-08-11 DIAGNOSIS — E78.5 DYSLIPIDEMIA: ICD-10-CM

## 2023-08-11 PROCEDURE — 36415 COLL VENOUS BLD VENIPUNCTURE: CPT

## 2023-08-11 PROCEDURE — 80061 LIPID PANEL: CPT

## 2023-08-11 PROCEDURE — 84443 ASSAY THYROID STIM HORMONE: CPT

## 2023-08-11 PROCEDURE — 80053 COMPREHEN METABOLIC PANEL: CPT

## 2023-08-12 LAB
ALBUMIN SERPL BCP-MCNC: 3.6 G/DL (ref 3.5–5)
ALP SERPL-CCNC: 68 U/L (ref 46–116)
ALT SERPL W P-5'-P-CCNC: 24 U/L (ref 12–78)
ANION GAP SERPL CALCULATED.3IONS-SCNC: 4 MMOL/L
AST SERPL W P-5'-P-CCNC: 25 U/L (ref 5–45)
BILIRUB SERPL-MCNC: 0.61 MG/DL (ref 0.2–1)
BUN SERPL-MCNC: 16 MG/DL (ref 5–25)
CALCIUM SERPL-MCNC: 8.8 MG/DL (ref 8.3–10.1)
CHLORIDE SERPL-SCNC: 106 MMOL/L (ref 96–108)
CHOLEST SERPL-MCNC: 221 MG/DL
CO2 SERPL-SCNC: 30 MMOL/L (ref 21–32)
CREAT SERPL-MCNC: 0.68 MG/DL (ref 0.6–1.3)
GFR SERPL CREATININE-BSD FRML MDRD: 90 ML/MIN/1.73SQ M
GLUCOSE P FAST SERPL-MCNC: 85 MG/DL (ref 65–99)
HDLC SERPL-MCNC: 73 MG/DL
LDLC SERPL CALC-MCNC: 139 MG/DL (ref 0–100)
NONHDLC SERPL-MCNC: 148 MG/DL
POTASSIUM SERPL-SCNC: 4.3 MMOL/L (ref 3.5–5.3)
PROT SERPL-MCNC: 7.4 G/DL (ref 6.4–8.4)
SODIUM SERPL-SCNC: 140 MMOL/L (ref 135–147)
TRIGL SERPL-MCNC: 44 MG/DL
TSH SERPL DL<=0.05 MIU/L-ACNC: 4.34 UIU/ML (ref 0.45–4.5)

## 2023-08-17 ENCOUNTER — OFFICE VISIT (OUTPATIENT)
Dept: CARDIOLOGY CLINIC | Facility: CLINIC | Age: 68
End: 2023-08-17
Payer: MEDICARE

## 2023-08-17 VITALS
WEIGHT: 203 LBS | SYSTOLIC BLOOD PRESSURE: 130 MMHG | HEART RATE: 64 BPM | OXYGEN SATURATION: 99 % | BODY MASS INDEX: 32.62 KG/M2 | HEIGHT: 66 IN | DIASTOLIC BLOOD PRESSURE: 82 MMHG

## 2023-08-17 DIAGNOSIS — I49.3 PVCS (PREMATURE VENTRICULAR CONTRACTIONS): ICD-10-CM

## 2023-08-17 DIAGNOSIS — E78.5 DYSLIPIDEMIA: ICD-10-CM

## 2023-08-17 DIAGNOSIS — R00.2 INTERMITTENT PALPITATIONS: ICD-10-CM

## 2023-08-17 DIAGNOSIS — I83.813 VARICOSE VEINS OF BILATERAL LOWER EXTREMITIES WITH PAIN: ICD-10-CM

## 2023-08-17 DIAGNOSIS — I10 UNCONTROLLED HYPERTENSION: Primary | ICD-10-CM

## 2023-08-17 DIAGNOSIS — R07.89 ATYPICAL CHEST PAIN: ICD-10-CM

## 2023-08-17 DIAGNOSIS — E66.9 CLASS 1 OBESITY: ICD-10-CM

## 2023-08-17 DIAGNOSIS — I10 ESSENTIAL HYPERTENSION: ICD-10-CM

## 2023-08-17 DIAGNOSIS — I87.2 VENOUS INSUFFICIENCY OF BOTH LOWER EXTREMITIES: ICD-10-CM

## 2023-08-17 PROCEDURE — 99214 OFFICE O/P EST MOD 30 MIN: CPT | Performed by: INTERNAL MEDICINE

## 2023-08-17 PROCEDURE — 93000 ELECTROCARDIOGRAM COMPLETE: CPT | Performed by: INTERNAL MEDICINE

## 2023-08-17 NOTE — PROGRESS NOTES
Office Cardiology Progress Note  Heather Hurst 76 y.o. female MRN: [id number]  08/17/23  7:35 PM      ASSESSMENT:    1.  Completely suppressed previous exacerbation of symptomatic frequent PVCs with palpitations, with documented ventricular trigeminy on previous two EKG's and on previous exam, mainly at rest, beginning on 06/04/2022, with chronic PVCs likely present for  approximately 2+ years.  Previous Holter monitor on 02/08/2021 showed 10.8% PVC density, no complex ventricular dysrhythmias, and rare atrial ectopy.  Similar findings noted on 06/24/2022 Holter monitor. No recent palpitations or PVCs noted by patient. 2.  History of uncontrolled essential hypertension, diagnosed on 01/31/2021 and reconfirmed on last office visit  with average home blood pressure reading of 139/85 between 2/5 and 02/08/2021 and again elevated to 138/81 between 10/24 and 10/27/2022. Today's office blood pressure is 130/82.  Recent average blood pressure at home 8/7 and 8/10/2023 was 145/90, but all measurements were with a wrist blood pressure cuff and previously between 6/4 and 06/08/2022 was mildly elevated at 140/80 with target of 129/79 or less. 3.  Improvement in mild acquired hypothyroidism with mildly elevated TSH level as of 01/31/2021, and again on 06/21/2022 which could be a contributing factor to her obesity and dyslipidemia and hypertension. Therapy deemed unnecessary by primary care physician, but I believe it may be helpful. Patient currently feeling much better with low-dose levothyroxine with improvement in recent TSH level of 8/11/2023. Patient notes resolution of hot flashes and improved health of her fingernails with treatment of her hypothyroidism. 4. Chronic dyslipidemia   with 7.6% 10 year ASCVD risk, which is low intermediate risk.   5. Chronic obesity, class 1 , with 3 pound weight loss in approximately 1.1+ years and 4.6 pound weight gain in approximately 2.4 years.   6.  Suppressed atypical chest discomfort described as substernal pressure associated with palpitation, nonexertional with CAD  likely excluded with normal stress test on  02/10/2021.  7. History of treated remote right breast cancer, treated with combination of radical right mastectomy and lymphadenectomy plus chemotherapy. 8.  Recently symptomatic lower extremity heaviness, tiredness, achiness presumably caused by chronic bilateral varicose veins and superficial venous insufficiency of both lower extremities, uncontrolled with support hosiery,  and previously increased discomfort in lower extremities. 9. Low cardiac risk for colonoscopy on 06/13/2022, which was completed on that date.       Plan       Patient Instructions     1. Purchase an upper arm Omron blood pressure unit, series 3, at [employer] or on [employer], if you do online shopping. 2. Once you have the new blood pressure unit, repeat 4 consecutive days of your blood pressures mid-to-late morning and again in the evening twice a day, 3 readings at a time 1 or 2 minutes apart, writing all of those on the blood pressure data sheet provided to you by the office of Dr. Mundo Perry. 3. If you have access to email, you can send the blood pressure report as an attachment to an email to the following email address: [email or web address]  4. Otherwise, you can drop off the report or fax it or mail it to our office. 5. If you do not hear from us within a week of getting us the information, give us a call to remind us to look for it. 6. For now, continue present medication until we get the updated blood pressure report, after which I will let you know if any changes are required. 7. Ask the primary care physician to handle adjustments of your thyroid medication. If the dose is higher and you notice any extra heartbeats or palpitations, let your primary care doctor know and ask them to lower your dose. 8. See vascular surgery regarding your increased discomfort in your legs.   9. Cardiology follow-up approximately 6 months with EKG. HPI    This 76 y.o. female  denies new cardiopulmonary  symptoms. The patient complains of her lower extremities feeling heavy, tired, and achy, which she attributes to her known varicose veins. She had been seen previously last year by a scooter surgery who recommended continued compression hosiery, leg elevation and exercise. Her symptoms then were not as pronounced as they are now. The patient has had improvement in the growth and health of her fingernails and in terms of her hot flashes since being placed on thyroid supplementation. Her primary care physician would like to increase her thyroid supplement dosage, as he feels her TSH level is still a bit high for her gender and age. The patient is also being seen in follow-up of the below listed diagnoses. Encounter Diagnoses   Name Primary?    • Uncontrolled hypertension Yes   • Essential hypertension    • PVCs (premature ventricular contractions)    • Intermittent palpitations    • Venous insufficiency of both lower extremities    • Atypical chest pain    • Dyslipidemia    • Class 1 obesity    • Varicose veins of bilateral lower extremities with pain         Review of Systems    All other systems negative, except as noted in history of present illness    Historical Information   Past Medical History:   Diagnosis Date   • BRCA1 negative    • BRCA2 negative    • Breast cancer ([address])    • History of chemotherapy      Past Surgical History:   Procedure Laterality Date   • HYSTERECTOMY     • MASTECTOMY Right     1995   • MASTECTOMY Right    • OOPHORECTOMY     • TONSILLECTOMY     • TUMOR REMOVAL      mult tumor removals from age of 15 on     Social History     Substance and Sexual Activity   Alcohol Use No     Social History     Substance and Sexual Activity   Drug Use No     Social History     Tobacco Use   Smoking Status Never   Smokeless Tobacco Never       Family History:  Family History   Problem Relation Age of Onset   • Brain cancer Mother    • Lung cancer Father    • Prostate cancer Father    • Lung cancer Sister    • Ovarian cancer Sister    • No Known Problems Maternal Grandmother    • No Known Problems Maternal Grandfather    • Breast cancer Paternal Grandmother         unkown age   • No Known Problems Paternal Grandfather    • No Known Problems Brother    • No Known Problems Maternal Aunt    • No Known Problems Maternal Uncle    • No Known Problems Paternal Aunt    • No Known Problems Paternal Uncle    • ADD / ADHD Neg Hx    • Anesthesia problems Neg Hx    • Cancer Neg Hx    • Clotting disorder Neg Hx    • Collagen disease Neg Hx    • Diabetes Neg Hx    • Dislocations Neg Hx    • Learning disabilities Neg Hx    • Neurological problems Neg Hx    • Osteoporosis Neg Hx    • Rheumatologic disease Neg Hx    • Scoliosis Neg Hx    • Vascular Disease Neg Hx          Meds/Allergies     Prior to Admission medications    Medication Sig Start Date End Date Taking?  Authorizing Provider   acetaminophen (TYLENOL) 325 mg tablet Take 2 tablets (650 mg total) by mouth every 6 (six) hours as needed for mild pain 12/2/22  Yes Oracio Odell MD   Apple Cider Vinegar 500 MG TABS Take 500 mg by mouth 2 (two) times a day   Yes Historical Provider, MD   Ascorbic Acid (VITAMIN C) 500 MG CAPS Take by mouth   Yes Historical Provider, MD   Cholecalciferol (Vitamin D3) 125 MCG (5000 UT) TABS Take 5,000 Units by mouth daily   Yes Historical Provider, MD   Coenzyme Q10 (COQ-10) 10 MG CAPS Take by mouth   Yes Historical Provider, MD   Cyanocobalamin (VITAMIN B 12 PO) Take by mouth   Yes Historical Provider, MD   diazepam (VALIUM) 5 mg tablet Take 1 tablet (5 mg total) by mouth every 6 (six) hours as needed for anxiety 12/2/22  Yes Oracio Odell MD   Flax OIL Take by mouth   Yes Historical Provider, MD   Iron Combinations (IRON COMPLEX PO) Take by mouth   Yes Historical Provider, MD   lactobacillus acidophilus Take by mouth   Yes Historical Provider, MD   levothyroxine 25 mcg tablet Take 37.5 mcg by mouth every morning Take 1-1/2 tablets by mouth every morning 7/30/22  Yes Historical Provider, MD   Magnesium Oxide 140 MG CAPS Take by mouth   Yes Historical Provider, MD   patient supplied medication Take 2 tablets by mouth daily in the early morning [employer] nitric oxide    Yes Historical Provider, MD   patient supplied medication neprinol   Yes Historical Provider, MD   pyridoxine (VITAMIN B6) 100 mg tablet Take by mouth   Yes Historical Provider, MD   selenium 200 mcg Take by mouth   Yes Historical Provider, MD   Vitamin E 400 units TABS Take by mouth   Yes Historical Provider, MD   EPINEPHrine (EPIPEN) 0.3 mg/0.3 mL SOAJ Inject 0.3 mL (0.3 mg total) into a muscle as needed for anaphylaxis  Patient not taking: Reported on 1/5/2023 10/27/21   Vasquez Allred MD   SUPER B COMPLEX/C PO Take by mouth  Patient not taking: Reported on 8/17/2023    Historical Provider, MD       Allergies   Allergen Reactions   • Shellfish-Derived Products - Food Allergy Anaphylaxis   • Augmentin [Amoxicillin-Pot Clavulanate]    • Codeine Other (See Comments)     " I can't function "   • Hydrocodone-Acetaminophen Vomiting   • Levofloxacin Nausea Only and Vomiting         Vitals:    08/17/23 1254   BP: 130/82   BP Location: Left arm   Patient Position: Sitting   Cuff Size: Large   Pulse: 64   SpO2: 99%   Weight: 92.1 kg (203 lb)   Height: 5' 6" (1.676 m)       Body mass index is 32.77 kg/m². 2 pound weight loss in approximately 8-1/2 months and 3 pound weight loss in approximately 1.1-years    Physical Exam:    General Appearance:  Alert, cooperative, no distress, appears stated age and is moderately obese.    Head:  Normocephalic, without obvious abnormality, atraumatic   Eyes:  PERRL, conjunctiva/corneas clear, EOM's intact,   both eyes   Ears:  Normal TM's and external ear canals, both ears   Nose: Nares normal, septum midline, mucosa normal, no drainage or sinus tenderness   Throat: Lips, mucosa, and tongue normal; teeth and gums normal   Neck: Supple, symmetrical, trachea midline, no adenopathy, thyroid: not enlarged, symmetric, no tenderness/mass/nodules, no carotid bruit or JVD   Back:   Symmetric, no curvature, ROM normal, no CVA tenderness   Lungs:   Clear to auscultation bilaterally, respirations unlabored   Chest Wall:  No tenderness or deformity   Heart:  Regular rate and rhythm, S1, S2 normal, no murmur, rub or gallop   Abdomen:   Soft, non-tender, bowel sounds active all four quadrants,  no masses, no organomegaly. Moderate abdominal obesity noted. Extremities: Extremities show bilateral varicose veins, are atraumatic, with no cyanosis or edema   Pulses: 2+ and symmetric   Skin: Skin showed normal color, texture, turgor and no rashes or lesions   Lymph nodes: Cervical, supraclavicular, and axillary nodes normal   Neurologic: Normal         Cardiographics    ECG 08/17/23:    Normal sinus rhythm at 64 bpm  Moderate voltage criteria for LVH  Delayed precordial R/S transition  Leftward frontal plane QRS axis  Abnormal ECG  Suppressed PVCs with no other changes compared to 12/2/2022    IMAGING:    No Chest XR results available for this patient.           LAB REVIEW:      Lab Results   Component Value Date    SODIUM 140 08/11/2023    K 4.3 08/11/2023     08/11/2023    CO2 30 08/11/2023    ANIONGAP 12 12/05/2013    BUN 16 08/11/2023    CREATININE 0.68 08/11/2023    GLUCOSE 101 12/05/2013    GLUF 85 08/11/2023    CALCIUM 8.8 08/11/2023    CORRECTEDCA 9.0 06/21/2022    AST 25 08/11/2023    ALT 24 08/11/2023    ALKPHOS 68 08/11/2023    PROT 7.5 11/13/2013    BILITOT 0.14 (L) 11/13/2013    EGFR 90 08/11/2023     Lab Results   Component Value Date    CHOLESTEROL 221 (H) 08/11/2023    CHOLESTEROL 225 (H) 11/28/2022    CHOLESTEROL 218 (H) 06/21/2022     Lab Results   Component Value Date    HDL 73 08/11/2023    HDL 82 11/28/2022    HDL 64 06/21/2022       Lab Results   Component Value Date    LDLCALC 139 (H) 08/11/2023    LDLCALC 133 (H) 11/28/2022    LDLCALC 145 (H) 06/21/2022     No components found for: "DIRECTLDLREFLEX"  Lab Results   Component Value Date    TRIG 44 08/11/2023    TRIG 48 11/28/2022    TRIG 46 06/21/2022   10-year ASCVD risk: 7.6%, low intermediate risk    TSH 8/11/2023: 4.339, decreased from 6.350 on 11/28/2022          Lianne Ayala MD

## 2023-08-17 NOTE — PATIENT INSTRUCTIONS
Purchase an upper arm Omron blood pressure unit, series 3, at [facility] or on [address] [place] [address], if you do online shopping. Once you have the new blood pressure unit, repeat 4 consecutive days of your blood pressures mid-to-late morning and again in the evening twice a day, 3 readings at a time 1 or 2 minutes apart, writing all of those on the blood pressure data sheet provided to you by the office of Dr. Kurtis Yanez. If you have access to email, you can send the blood pressure report as an attachment to an email to the following email address: [email or web address]  Otherwise, you can drop off the report or fax it or mail it to our office. If you do not hear from us within a week of getting us the information, give us a call to remind us to look for it. For now, continue present medication until we get the updated blood pressure report, after which I will let you know if any changes are required. Ask the primary care physician to handle adjustments of your thyroid medication. If the dose is higher and you notice any extra heartbeats or palpitations, let your primary care doctor know and ask them to lower your dose. See vascular surgery regarding your increased discomfort in your legs. Cardiology follow-up approximately 6 months with EKG.

## 2023-09-25 ENCOUNTER — HOSPITAL ENCOUNTER (OUTPATIENT)
Dept: RADIOLOGY | Facility: HOSPITAL | Age: 68
Discharge: HOME | End: 2023-09-25
Payer: MEDICARE

## 2023-09-25 DIAGNOSIS — Z12.31 ENCOUNTER FOR SCREENING MAMMOGRAM FOR MALIGNANT NEOPLASM OF BREAST: ICD-10-CM

## 2023-09-25 PROCEDURE — 77063 BREAST TOMOSYNTHESIS BI: CPT

## 2023-09-25 PROCEDURE — 77067 SCR MAMMO BI INCL CAD: CPT

## 2023-10-05 ENCOUNTER — APPOINTMENT (OUTPATIENT)
Dept: LAB | Facility: CLINIC | Age: 68
End: 2023-10-05
Payer: MEDICARE

## 2023-10-05 DIAGNOSIS — E03.9 HYPOTHYROIDISM, UNSPECIFIED: ICD-10-CM

## 2023-10-05 LAB — TSH SERPL DL<=0.05 MIU/L-ACNC: 3.8 UIU/ML (ref 0.45–4.5)

## 2023-10-05 PROCEDURE — 36415 COLL VENOUS BLD VENIPUNCTURE: CPT

## 2023-10-05 PROCEDURE — 84443 ASSAY THYROID STIM HORMONE: CPT

## 2024-02-15 NOTE — PROGRESS NOTES
Assessment/Plan:    Venous insufficiency of both lower extremities  Varicose veins of bilateral lower extremities with pain  -     Compression Stocking  -Chronic B VV with leg edema x several years; overall stable with compression   -Increasing vv in the L foot  -Exam: Moderate B LE vv: truncal anterior thighs, reticular and vv up to 5 mm wide over calves, small vv and spider veins at the ankles    Plan:Patient with known venous insufficiency and varicose veins of bilateral lower extremities.  She has developed some increased varicosities in the left foot.  Otherwise, she has been stable on conservative management with compression stockings for past couple of years and without significant worsening leg symptoms.     We reviewed her reflux study from 2020, which does not show any deep venous incompetence.  There is mainly great saphenous vein incompetence below the knees.  Since her symptoms are fairly stable, we can continue with conservative measures.  She is given a prescription for updated compression stockings.  However, she is advised if she feels symptoms are worsening, despite compression and other conservative measures then we can reevaluate her.  At that point, we could repeat a venous reflux study to evaluate for progression of venous incompetence in order to evaluate for potential additional treatment/ surgical options.     -Order for prescription graded compression stockings of 20-30 mm Hg  -Recommend fresh compression stockings which should be replaced periodically as needed since they wear out  -Continue compression, periodic elevation, low-sodium diet, regular exercise for weight loss  -Patient education for venous insufficiency.  -Follow-up as needed for worsening symptoms      Class 1 obesity    Subjective:      Patient ID: Elaine Bolaños is a 68 y.o. female.  Patient presents today to re-establish care for VV. Reports BLE VV with heaviness, discomfort and swelling. Wearing compression daily. Would  "like to discuss further treatment options.     HPI    Ms. Bolaños 68 yoF obesity, hypertension, dyslipidemia, varicose veins and venous insufficiency who returns for vascular follow-up.    Former patient of Dr. Moore who underwent a venous reflux study 11/20/20 which demonstrated great saphenous vein incompetency calves without deep venous incompetence. She was recommended for medical compression stockings, elevation and exercise, as well as referral for sclerotherapy for symptomatic veins at the ankles.     2/16/24: Ms. Bolaños was last seen about 1.5 years ago.  She comes in for reevaluation of varicose veins and venous insufficiency.  Her primary concern is increased \"broken capillaries\" in the left foot which she presumably sustained last week after exercising with weights on her ankles which she forgot she was wearing and may have had some minor trauma to the foot.  After that, she had increased swelling to the foot, but nothing severe, red or tender to suggest fracture.    Patient states that she actively working on taking care of her legs. She is wearing below the knee compression stockings with full compliance. She elevates during the day. She also exercises with stretches and strengthening in the morning and walking 20 minutes later in the day.  As the day goes on her legs do get tired and more swollen, but improve by the next day.  In general she has not had significant worsening of symptoms since she was last seen, but may have had some increased number of varicosities.  The varicose veins are not particularly painful or tender. Reviewed clinical images from 10/2020 and vv appear similar to that time.     We reviewed her reflux study from 2020, which does not show any deep venous incompetence.  There is mainly great saphenous vein incompetence below the knees.  Since her symptoms are fairly stable, we can continue with conservative measures.  She is given a prescription for updated compression stockings.  " However, she is advised if she feels symptoms are worsening, despite compression, elevation and regular exercise, we can reevaluate her.  At that point, we could repeat a venous reflux study to evaluate for progression of venous incompetence and evaluate his potential additional treatment/ surgical options.     Although she does have obvious varicose veins and venous disease, she is also advised that weight, medications, hypertension and metabolic's may contribute to leg swelling so she should work with her primary doctor to be sure these are optimized.      Venous reflux 11/12/20  CLINICAL:  Indications:  Patient presents with history of Bilateral lower extremity varicose veins.  Patient reports swelling.  Patient recently has been wearing therapeutic  compressions stockings.  Operative History:  Hysterectomy  Risk Factors:  The patient has history of Cancer and Obesity.     FINDINGS:     Segment         Right                     Left                              Diameter AP  Reflux Time  Diameter AP  Reflux Time    GSV Inguinal            8.7         0.30         11.1         0.40    GSV Prox Thigh          7.9         0.30                              GSV Mid Thigh           2.8                       3.1                 GSV Dist Thigh          2.4                       2.7                 GSV Knee                3.2         0.20          6.4                 GSV Prox Calf           2.4         4.70          7.1         1.80    GSV Mid Calf            1.1                       1.2                 GSV Dist Calf           2.3                       1.9         1.60    SSV Mid Calf            2.2                       1.9                 SSV Knee                1.9                       1.7                 SSV Ankle               1.6                       3.3                      CONCLUSION:  Impression:  RIGHT LIMB:  No evidence of deep venous incompetence.  The great saphenous vein is incompetent.  The great  "saphenous vein remains within the saphenous compartment in the thigh.  The small saphenous vein is competent and does not communicate with the  popliteal vein.  There is no evidence of incompetent perforators in the thigh or calf.  There is no evidence of deep vein thrombosis in the CFV, the proximal PFV, the  femoral vein and the popliteal vein.     LEFT LIMB:  No evidence of deep venous incompetence.  The great saphenous vein is incompetent.  The great saphenous vein remains within the saphenous compartment in the thigh.  The small saphenous vein is competent and does not communicate with the  popliteal vein.  There is no evidence of incompetent perforators in the thigh or calf.  There is no evidence of deep vein thrombosis in the CFV, the proximal PFV, the  femoral vein and the popliteal vein.      The following portions of the patient's history were reviewed and updated as appropriate: allergies, current medications, past family history, past medical history, past social history, past surgical history, and problem list.    Review of Systems   Constitutional: Negative.    HENT: Negative.     Eyes: Negative.    Respiratory: Negative.     Cardiovascular:  Positive for leg swelling.   Gastrointestinal: Negative.    Endocrine: Negative.    Genitourinary: Negative.    Musculoskeletal: Negative.    Skin: Negative.    Allergic/Immunologic: Negative.    Neurological: Negative.    Hematological: Negative.    Psychiatric/Behavioral: Negative.         Objective:    /90 (BP Location: Left arm, Patient Position: Sitting)   Pulse 68   Ht 5' 6\" (1.676 m)   Wt 92.5 kg (204 lb)   BMI 32.93 kg/m²      Physical Exam  Vitals and nursing note reviewed.   Constitutional:       Appearance: She is well-developed. She is obese.   HENT:      Head: Normocephalic and atraumatic.   Eyes:      Pupils: Pupils are equal, round, and reactive to light.   Neck:      Thyroid: No thyromegaly.      Vascular: No JVD.      Trachea: Trachea " "normal.   Cardiovascular:      Rate and Rhythm: Normal rate and regular rhythm.      Pulses:           Carotid pulses are 2+ on the right side and 2+ on the left side.       Radial pulses are 2+ on the right side and 2+ on the left side.        Dorsalis pedis pulses are 2+ on the right side and 2+ on the left side.      Heart sounds: Normal heart sounds, S1 normal and S2 normal. No murmur heard.     No friction rub. No gallop.   Pulmonary:      Effort: Pulmonary effort is normal. No accessory muscle usage or respiratory distress.      Breath sounds: Normal breath sounds. No wheezing or rales.   Abdominal:      General: Bowel sounds are normal. There is no distension.      Palpations: Abdomen is soft.      Tenderness: There is no abdominal tenderness.   Musculoskeletal:         General: No deformity. Normal range of motion.      Cervical back: Neck supple.      Right lower leg: Edema present.      Left lower leg: Edema present.   Skin:     General: Skin is warm and dry.      Findings: No lesion or rash.      Nails: There is no clubbing.   Neurological:      Mental Status: She is alert and oriented to person, place, and time.      Comments: Grossly normal    Psychiatric:         Behavior: Behavior is cooperative.           I have reviewed and made appropriate changes to the review of systems input by the medical assistant.    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    Intermittent lightheadedness    Venous insufficiency of both lower extremities    Essential hypertension    " Ventricular trigeminy    High risk medication use       Past Surgical History:   Procedure Laterality Date    HYSTERECTOMY      MASTECTOMY Right     1995    MASTECTOMY Right     OOPHORECTOMY      TONSILLECTOMY      TUMOR REMOVAL      mult tumor removals from age of 14 on       Family History   Problem Relation Age of Onset    Brain cancer Mother     Lung cancer Father     Prostate cancer Father     Lung cancer Sister     Ovarian cancer Sister     No Known Problems Maternal Grandmother     No Known Problems Maternal Grandfather     Breast cancer Paternal Grandmother         unkown age    No Known Problems Paternal Grandfather     No Known Problems Brother     No Known Problems Maternal Aunt     No Known Problems Maternal Uncle     No Known Problems Paternal Aunt     No Known Problems Paternal Uncle     ADD / ADHD Neg Hx     Anesthesia problems Neg Hx     Cancer Neg Hx     Clotting disorder Neg Hx     Collagen disease Neg Hx     Diabetes Neg Hx     Dislocations Neg Hx     Learning disabilities Neg Hx     Neurological problems Neg Hx     Osteoporosis Neg Hx     Rheumatologic disease Neg Hx     Scoliosis Neg Hx     Vascular Disease Neg Hx        Social History     Socioeconomic History    Marital status: /Civil Union     Spouse name: Not on file    Number of children: Not on file    Years of education: Not on file    Highest education level: Not on file   Occupational History    Not on file   Tobacco Use    Smoking status: Never    Smokeless tobacco: Never   Vaping Use    Vaping status: Never Used   Substance and Sexual Activity    Alcohol use: No    Drug use: No    Sexual activity: Yes     Partners: Male     Comment: rare   Other Topics Concern    Not on file   Social History Narrative    Not on file     Social Determinants of Health     Financial Resource Strain: Low Risk  (8/16/2023)    [facility]    Overall Financial Resource Strain (CARDIA)     Difficulty of Paying Living  Expenses: Not hard at all   Food Insecurity: No Food Insecurity (8/16/2023)    Received from [facility]    Hunger Vital Sign     Worried About Running Out of Food in the Last Year: Never true     Ran Out of Food in the Last Year: Never true   Transportation Needs: No Transportation Needs (8/16/2023)    Received from [facility]    PRAPARE - Transportation     Lack of Transportation (Medical): No     Lack of Transportation (Non-Medical): No   Physical Activity: Sufficiently Active (8/16/2023)    Received from [facility]    Exercise Vital Sign     Days of Exercise per Week: 7 days     Minutes of Exercise per Session: 40 min   Stress: Stress Concern Present (8/16/2023)    Received from [facility]    [place] [employer] - Occupational Stress Questionnaire     Feeling of Stress : Very much   Social Connections: Moderately Isolated (8/16/2023)    Received from [facility]    Social Connection and Isolation Panel [NHANES]     Frequency of Communication with Friends and Family: More than three times a week     Frequency of Social Gatherings with Friends and Family: Twice a week     Attends Hoahaoism Services: Never     Active Member of Clubs or Organizations: No     Attends Club or Organization Meetings: Never     Marital Status:    Intimate Partner Violence: Not At Risk (8/16/2023)    Received from [facility]    Humiliation, Afraid, Rape, and Kick questionnaire     Fear of Current or Ex-Partner: No     Emotionally Abused: No     Physically Abused: No     Sexually Abused: No   Housing Stability: Unknown (8/16/2023)    Received from [facility]    Housing Stability Vital Sign     Unable to Pay for Housing in the Last Year: No     Number of Places Lived in the Last Year: Not on file     Unstable Housing in the Last Year: Not on file       Allergies   Allergen Reactions    Shellfish-Derived Products - Food Allergy Anaphylaxis    Augmentin [Amoxicillin-Pot Clavulanate]  "    Codeine Other (See Comments)     \" I can't function \"    Hydrocodone-Acetaminophen Vomiting    Levofloxacin Nausea Only and Vomiting         Current Outpatient Medications:     acetaminophen (TYLENOL) 325 mg tablet, Take 2 tablets (650 mg total) by mouth every 6 (six) hours as needed for mild pain, Disp: 100 tablet, Rfl: 5    Apple Cider Vinegar 500 MG TABS, Take 500 mg by mouth 2 (two) times a day, Disp: , Rfl:     Ascorbic Acid (VITAMIN C) 500 MG CAPS, Take by mouth, Disp: , Rfl:     Cholecalciferol (Vitamin D3) 125 MCG (5000 UT) TABS, Take 5,000 Units by mouth daily, Disp: , Rfl:     Coenzyme Q10 (COQ-10) 10 MG CAPS, Take by mouth, Disp: , Rfl:     Cyanocobalamin (VITAMIN B 12 PO), Take by mouth, Disp: , Rfl:     diazepam (VALIUM) 5 mg tablet, Take 1 tablet (5 mg total) by mouth every 6 (six) hours as needed for anxiety, Disp: 30 tablet, Rfl: 5    EPINEPHrine (EPIPEN) 0.3 mg/0.3 mL SOAJ, Inject 0.3 mL (0.3 mg total) into a muscle as needed for anaphylaxis, Disp: 1 mL, Rfl: 5    Flax OIL, Take by mouth, Disp: , Rfl:     Iron Combinations (IRON COMPLEX PO), Take by mouth, Disp: , Rfl:     lactobacillus acidophilus, Take by mouth, Disp: , Rfl:     levothyroxine 25 mcg tablet, Take 37.5 mcg by mouth every morning Take 1-1/2 tablets by mouth every morning, Disp: , Rfl:     losartan (COZAAR) 50 mg tablet, Take 50 mg by mouth daily, Disp: , Rfl:     Magnesium Oxide 140 MG CAPS, Take by mouth, Disp: , Rfl:     patient supplied medication, Take 2 tablets by mouth daily in the early morning [employer] nitric oxide , Disp: , Rfl:     patient supplied medication, neprinol, Disp: , Rfl:     pyridoxine (VITAMIN B6) 100 mg tablet, Take by mouth, Disp: , Rfl:     selenium 200 mcg, Take by mouth, Disp: , Rfl:     SUPER B COMPLEX/C PO, Take by mouth, Disp: , Rfl:     Turmeric 500 MG CAPS, Take by mouth, Disp: , Rfl:     Vitamin E 400 units TABS, Take by mouth, Disp: , Rfl:       "

## 2024-02-16 ENCOUNTER — OFFICE VISIT (OUTPATIENT)
Dept: VASCULAR SURGERY | Facility: CLINIC | Age: 69
End: 2024-02-16
Payer: COMMERCIAL

## 2024-02-16 VITALS
WEIGHT: 204 LBS | HEART RATE: 68 BPM | SYSTOLIC BLOOD PRESSURE: 162 MMHG | HEIGHT: 66 IN | DIASTOLIC BLOOD PRESSURE: 90 MMHG | BODY MASS INDEX: 32.78 KG/M2

## 2024-02-16 DIAGNOSIS — E66.9 CLASS 1 OBESITY: Primary | ICD-10-CM

## 2024-02-16 DIAGNOSIS — I83.813 VARICOSE VEINS OF BILATERAL LOWER EXTREMITIES WITH PAIN: ICD-10-CM

## 2024-02-16 DIAGNOSIS — I87.2 VENOUS INSUFFICIENCY OF BOTH LOWER EXTREMITIES: ICD-10-CM

## 2024-02-16 PROCEDURE — 99214 OFFICE O/P EST MOD 30 MIN: CPT | Performed by: PHYSICIAN ASSISTANT

## 2024-02-16 RX ORDER — LOSARTAN POTASSIUM 50 MG/1
50 TABLET ORAL DAILY
COMMUNITY

## 2024-02-16 RX ORDER — VIT C/B6/B5/MAGNESIUM/HERB 173 50-5-6-5MG
CAPSULE ORAL
COMMUNITY

## 2024-02-16 NOTE — PATIENT INSTRUCTIONS
Venous insufficiency (chronic) (peripheral)    We had a detailed discussion regarding varicose veins and the treatment of venous disease. We discussed the role of compression and other conservative measures for relief of symptoms related to varicose veins.     Order for prescription graded compression stockings of 20-30 mm Hg  Wear stocking EVERY day to help control swelling in legs and remove at night  Elevate legs while at rest  Continue healthy life-style changes; heart-healthy, low sodium diet; regular exercise for weight loss  Patient education for venous insufficiency.  Follow up as needed for any worsening of symptoms - swelling, pain, fatigue, aching, heaviness.

## 2024-03-28 ENCOUNTER — HOSPITAL ENCOUNTER (EMERGENCY)
Facility: HOSPITAL | Age: 69
Discharge: HOME | End: 2024-03-28
Attending: EMERGENCY MEDICINE | Admitting: EMERGENCY MEDICINE
Payer: COMMERCIAL

## 2024-03-28 VITALS
DIASTOLIC BLOOD PRESSURE: 89 MMHG | OXYGEN SATURATION: 98 % | BODY MASS INDEX: 33.09 KG/M2 | RESPIRATION RATE: 14 BRPM | WEIGHT: 205 LBS | SYSTOLIC BLOOD PRESSURE: 190 MMHG | HEART RATE: 63 BPM | TEMPERATURE: 97.8 F

## 2024-03-28 DIAGNOSIS — M79.89 LEFT LEG SWELLING: ICD-10-CM

## 2024-03-28 DIAGNOSIS — I82.532 CHRONIC EMBOLISM AND THROMBOSIS OF LEFT POPLITEAL VEIN (HCC): ICD-10-CM

## 2024-03-28 DIAGNOSIS — M79.605 LEFT LEG PAIN: ICD-10-CM

## 2024-03-28 DIAGNOSIS — R79.89 ELEVATED D-DIMER: Primary | ICD-10-CM

## 2024-03-28 LAB
ALBUMIN SERPL BCP-MCNC: 3.9 G/DL (ref 3.5–5)
ALP SERPL-CCNC: 50 U/L (ref 34–104)
ALT SERPL W P-5'-P-CCNC: 14 U/L (ref 7–52)
ANION GAP SERPL CALCULATED.3IONS-SCNC: 6 MMOL/L (ref 4–13)
AST SERPL W P-5'-P-CCNC: 16 U/L (ref 13–39)
BASOPHILS # BLD AUTO: 0.02 THOUSANDS/ÂΜL (ref 0–0.1)
BASOPHILS NFR BLD AUTO: 0 % (ref 0–1)
BILIRUB SERPL-MCNC: 0.37 MG/DL (ref 0.2–1)
BNP SERPL-MCNC: 92 PG/ML (ref 0–100)
BUN SERPL-MCNC: 19 MG/DL (ref 5–25)
CALCIUM SERPL-MCNC: 9.5 MG/DL (ref 8.4–10.2)
CHLORIDE SERPL-SCNC: 104 MMOL/L (ref 96–108)
CO2 SERPL-SCNC: 27 MMOL/L (ref 21–32)
CREAT SERPL-MCNC: 0.73 MG/DL (ref 0.6–1.3)
D DIMER PPP FEU-MCNC: 1.53 UG/ML FEU
EOSINOPHIL # BLD AUTO: 0.1 THOUSAND/ÂΜL (ref 0–0.61)
EOSINOPHIL NFR BLD AUTO: 2 % (ref 0–6)
ERYTHROCYTE [DISTWIDTH] IN BLOOD BY AUTOMATED COUNT: 13.1 % (ref 11.6–15.1)
GFR SERPL CREATININE-BSD FRML MDRD: 84 ML/MIN/1.73SQ M
GLUCOSE SERPL-MCNC: 143 MG/DL (ref 65–140)
HCT VFR BLD AUTO: 39.1 % (ref 34.8–46.1)
HGB BLD-MCNC: 12.6 G/DL (ref 11.5–15.4)
IMM GRANULOCYTES # BLD AUTO: 0.01 THOUSAND/UL (ref 0–0.2)
IMM GRANULOCYTES NFR BLD AUTO: 0 % (ref 0–2)
LYMPHOCYTES # BLD AUTO: 1.89 THOUSANDS/ÂΜL (ref 0.6–4.47)
LYMPHOCYTES NFR BLD AUTO: 35 % (ref 14–44)
MCH RBC QN AUTO: 29.9 PG (ref 26.8–34.3)
MCHC RBC AUTO-ENTMCNC: 32.2 G/DL (ref 31.4–37.4)
MCV RBC AUTO: 93 FL (ref 82–98)
MONOCYTES # BLD AUTO: 0.46 THOUSAND/ÂΜL (ref 0.17–1.22)
MONOCYTES NFR BLD AUTO: 9 % (ref 4–12)
NEUTROPHILS # BLD AUTO: 2.88 THOUSANDS/ÂΜL (ref 1.85–7.62)
NEUTS SEG NFR BLD AUTO: 54 % (ref 43–75)
NRBC BLD AUTO-RTO: 0 /100 WBCS
PLATELET # BLD AUTO: 205 THOUSANDS/UL (ref 149–390)
PMV BLD AUTO: 10.6 FL (ref 8.9–12.7)
POTASSIUM SERPL-SCNC: 3.9 MMOL/L (ref 3.5–5.3)
PROT SERPL-MCNC: 6.8 G/DL (ref 6.4–8.4)
RBC # BLD AUTO: 4.22 MILLION/UL (ref 3.81–5.12)
SODIUM SERPL-SCNC: 137 MMOL/L (ref 135–147)
WBC # BLD AUTO: 5.36 THOUSAND/UL (ref 4.31–10.16)

## 2024-03-28 PROCEDURE — 85025 COMPLETE CBC W/AUTO DIFF WBC: CPT | Performed by: EMERGENCY MEDICINE

## 2024-03-28 PROCEDURE — 83880 ASSAY OF NATRIURETIC PEPTIDE: CPT | Performed by: EMERGENCY MEDICINE

## 2024-03-28 PROCEDURE — 36415 COLL VENOUS BLD VENIPUNCTURE: CPT | Performed by: EMERGENCY MEDICINE

## 2024-03-28 PROCEDURE — 99283 EMERGENCY DEPT VISIT LOW MDM: CPT

## 2024-03-28 PROCEDURE — 80053 COMPREHEN METABOLIC PANEL: CPT | Performed by: EMERGENCY MEDICINE

## 2024-03-28 PROCEDURE — 96372 THER/PROPH/DIAG INJ SC/IM: CPT

## 2024-03-28 PROCEDURE — 85379 FIBRIN DEGRADATION QUANT: CPT | Performed by: EMERGENCY MEDICINE

## 2024-03-28 PROCEDURE — 99284 EMERGENCY DEPT VISIT MOD MDM: CPT | Performed by: EMERGENCY MEDICINE

## 2024-03-28 RX ORDER — ENOXAPARIN SODIUM 100 MG/ML
1 INJECTION SUBCUTANEOUS ONCE
Status: COMPLETED | OUTPATIENT
Start: 2024-03-28 | End: 2024-03-28

## 2024-03-28 RX ORDER — ACETAMINOPHEN 325 MG/1
975 TABLET ORAL ONCE
Status: DISCONTINUED | OUTPATIENT
Start: 2024-03-28 | End: 2024-03-29 | Stop reason: HOSPADM

## 2024-03-28 RX ORDER — KETOROLAC TROMETHAMINE 30 MG/ML
15 INJECTION, SOLUTION INTRAMUSCULAR; INTRAVENOUS ONCE
Status: DISCONTINUED | OUTPATIENT
Start: 2024-03-28 | End: 2024-03-29 | Stop reason: HOSPADM

## 2024-03-28 RX ADMIN — ENOXAPARIN SODIUM 90 MG: 100 INJECTION SUBCUTANEOUS at 23:15

## 2024-03-29 ENCOUNTER — HOSPITAL ENCOUNTER (OUTPATIENT)
Dept: RADIOLOGY | Facility: HOSPITAL | Age: 69
Discharge: HOME | End: 2024-03-29
Attending: EMERGENCY MEDICINE
Payer: COMMERCIAL

## 2024-03-29 DIAGNOSIS — R79.89 ELEVATED D-DIMER: ICD-10-CM

## 2024-03-29 DIAGNOSIS — I82.532 CHRONIC EMBOLISM AND THROMBOSIS OF LEFT POPLITEAL VEIN (HCC): ICD-10-CM

## 2024-03-29 PROCEDURE — 93971 EXTREMITY STUDY: CPT

## 2024-03-29 PROCEDURE — 93971 EXTREMITY STUDY: CPT | Performed by: SURGERY

## 2024-03-29 NOTE — ED CARE HANDOFF
Emergency Department Sign Out Note        Sign out and transfer of care from Dr. Tavares. See Separate Emergency Department note.     The patient, Elaine Bolaños, was evaluated by the previous provider for Left leg swelling.    Workup Completed:  Patient had labs done    ED Course / Workup Pending (followup):  Pending dimer;   Patient is dimer is elevated.  I discussed with patient given her left lower leg swelling as well as with the elevated dimer that she may have DVT but we cannot rule that here now.  Order was placed for ultrasound.  patient was concerned that she might not be able to get her primary care doctor to prescribe her Eliquis if she does have a positive DVT study so I described with her that I will write her a paper prescription for Eliquis but that she should not fill it unless she has a positive DVT.  Instructed to return should she have chest pain or shortness of breath.  Questions answered to patient satisfaction and patient was discharged home.                                  ED Course as of 03/28/24 2311   Thu Mar 28, 2024   2241 Lle swelling, dimer and dispo   2254 D-Dimer, Quant(!): 1.53  In the setting of this elevation, will give her a dose of lovenox and have her come back for US tomorrow. Will place order     Procedures  Medical Decision Making  Amount and/or Complexity of Data Reviewed  Labs: ordered. Decision-making details documented in ED Course.    Risk  OTC drugs.  Prescription drug management.            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)     Time reflects when diagnosis was documented in both MDM as applicable and the Disposition within this note       Time User Action Codes Description Comment    3/28/2024 10:43 PM Rosmery Tavares Add [M79.89] Left leg swelling     3/28/2024 10:43 PM Rosmery Tavares Add [M79.605] Left leg pain     3/28/2024 11:05 PM Paul Sainz  Add [R79.89] Elevated d-dimer     3/28/2024 11:08 PM Paul Sainz Modify [M79.89] Left leg swelling     3/28/2024 11:08 PM Paul Sainz Modify [R79.89] Elevated d-dimer     3/28/2024 11:09 PM Paul Sainz Add [I82.532] 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                   Follow-up Information       Follow up With Specialties Details Why Contact Info Additional Information    Joseph Benson MD  Call  As needed [address] [place] [id number]  [phone]       [facility] Emergency Department Emergency Medicine Go to  If symptoms worsen, As needed [address] [place] [address] [id number] [phone] [facility] Emergency Department, [address] [place] [address], [id number]          Patient's Medications   Discharge Prescriptions    APIXABAN (ELIQUIS) 5 MG    Take 2 tablets (10 mg total) by mouth 2 (two) times a day for 7 days, THEN 1 tablet (5 mg total) 2 (two) times a day for 23 days.       Start Date: 3/28/2024 End Date: 4/27/2024       Order Dose: --       Quantity: 74 tablet    Refills: 0     Outpatient Discharge Orders   VAS VENOUS DUPLEX -LOWER LIMB UNILATERAL   Standing Status: Future Standing Exp. Date: 03/28/28          ED Provider  Electronically Signed by     Paul Sainz MD  03/28/24 5252

## 2024-03-29 NOTE — ED NOTES
"Pt requesting warm compress and \"pediatric needle\" prior to iv insertion. Pt sts she has difficult veins and this is the only effective way to get blood. Pt now has warm compress applied to l arm     Samantha M Goodell, RN  03/28/24 2109    "

## 2024-03-29 NOTE — ED PROVIDER NOTES
"History  Chief Complaint   Patient presents with    Leg Swelling     States she has a \" vascular issue \" both lower legs. Did have mild swelling left lower that today when she awoke had pain and more swelling. Patient is concerned about a clot.      Pt is a 67yo F who presents for leg swelling.  Patient reports history of varicose veins and venous stasis in her bilateral lower extremities.  Patient reports vascularly her right leg is typically worse.  However, over the past 1 to 2 months she has noticed swelling in her left leg greater than her right.  Patient reports that she was seen outpatient and they did not recommend duplex at that time but did recommend continuing compression stockings.  Patient reports today she began with pain around her left ankle and the posterior aspect of her left calf and that is why she came in for further evaluation.  Patient has no history of blood clots but is currently concerned for a DVT.  Patient denies any chest pain, shortness of breath, or other associated symptoms.  Patient states she is able to ambulate but it is painful to do so.  Patient denies any trauma or injury to the area.        Prior to Admission Medications   Prescriptions Last Dose Informant Patient Reported? Taking?   Apple Cider Vinegar 500 MG TABS  Self Yes No   Sig: Take 500 mg by mouth 2 (two) times a day   Ascorbic Acid (VITAMIN C) 500 MG CAPS  Self Yes No   Sig: Take by mouth   Cholecalciferol (Vitamin D3) 125 MCG (5000 UT) TABS  Self Yes No   Sig: Take 5,000 Units by mouth daily   Coenzyme Q10 (COQ-10) 10 MG CAPS  Self Yes No   Sig: Take by mouth   Cyanocobalamin (VITAMIN B 12 PO)  Self Yes No   Sig: Take by mouth   EPINEPHrine (EPIPEN) 0.3 mg/0.3 mL SOAJ  Self No No   Sig: Inject 0.3 mL (0.3 mg total) into a muscle as needed for anaphylaxis   Flax OIL  Self Yes No   Sig: Take by mouth   Iron Combinations (IRON COMPLEX PO)  Self Yes No   Sig: Take by mouth   Magnesium Oxide 140 MG CAPS  Self Yes No   Sig: " Take by mouth   SUPER B COMPLEX/C PO  Self Yes No   Sig: Take by mouth   Turmeric 500 MG CAPS  Self Yes No   Sig: Take by mouth   Vitamin E 400 units TABS  Self Yes No   Sig: Take by mouth   acetaminophen (TYLENOL) 325 mg tablet  Self No No   Sig: Take 2 tablets (650 mg total) by mouth every 6 (six) hours as needed for mild pain   diazepam (VALIUM) 5 mg tablet  Self No No   Sig: Take 1 tablet (5 mg total) by mouth every 6 (six) hours as needed for anxiety   lactobacillus acidophilus  Self Yes No   Sig: Take by mouth   levothyroxine 25 mcg tablet  Self Yes No   Sig: Take 37.5 mcg by mouth every morning Take 1-1/2 tablets by mouth every morning   losartan (COZAAR) 50 mg tablet  Self Yes No   Sig: Take 50 mg by mouth daily   patient supplied medication  Self Yes No   Sig: Take 2 tablets by mouth daily in the early morning Super Beets nitric oxide    patient supplied medication  Self Yes No   Sig: neprinol   pyridoxine (VITAMIN B6) 100 mg tablet  Self Yes No   Sig: Take by mouth   selenium 200 mcg  Self Yes No   Sig: Take by mouth      Facility-Administered Medications: None       Past Medical History:   Diagnosis Date    BRCA1 negative     BRCA2 negative     Breast cancer (HCC)     History of chemotherapy        Past Surgical History:   Procedure Laterality Date    HYSTERECTOMY      MASTECTOMY Right     1995    MASTECTOMY Right     OOPHORECTOMY      TONSILLECTOMY      TUMOR REMOVAL      mult tumor removals from age of 14 on       Family History   Problem Relation Age of Onset    Brain cancer Mother     Lung cancer Father     Prostate cancer Father     Lung cancer Sister     Ovarian cancer Sister     No Known Problems Maternal Grandmother     No Known Problems Maternal Grandfather     Breast cancer Paternal Grandmother         unkown age    No Known Problems Paternal Grandfather     No Known Problems Brother     No Known Problems Maternal Aunt     No Known Problems Maternal Uncle     No Known Problems Paternal Aunt      No Known Problems Paternal Uncle     ADD / ADHD Neg Hx     Anesthesia problems Neg Hx     Cancer Neg Hx     Clotting disorder Neg Hx     Collagen disease Neg Hx     Diabetes Neg Hx     Dislocations Neg Hx     Learning disabilities Neg Hx     Neurological problems Neg Hx     Osteoporosis Neg Hx     Rheumatologic disease Neg Hx     Scoliosis Neg Hx     Vascular Disease Neg Hx      I have reviewed and agree with the history as documented.    E-Cigarette/Vaping    E-Cigarette Use Never User      E-Cigarette/Vaping Substances    Nicotine No     THC No     CBD No     Flavoring No     Other No     Unknown No      Social History     Tobacco Use    Smoking status: Never    Smokeless tobacco: Never   Vaping Use    Vaping status: Never Used   Substance Use Topics    Alcohol use: No    Drug use: No       Review of Systems   Cardiovascular:  Positive for leg swelling (L).   All other systems reviewed and are negative.      Physical Exam  Physical Exam  Vitals reviewed.   Constitutional:       General: She is not in acute distress.     Appearance: She is well-developed. She is not toxic-appearing or diaphoretic.   HENT:      Head: Normocephalic and atraumatic.      Right Ear: External ear normal.      Left Ear: External ear normal.      Nose: Nose normal.      Mouth/Throat:      Pharynx: Oropharynx is clear.   Eyes:      Extraocular Movements: Extraocular movements intact.      Pupils: Pupils are equal, round, and reactive to light.   Cardiovascular:      Rate and Rhythm: Normal rate and regular rhythm.      Heart sounds: Normal heart sounds. No murmur heard.  Pulmonary:      Effort: Pulmonary effort is normal. No respiratory distress.      Breath sounds: Normal breath sounds.   Abdominal:      Palpations: Abdomen is soft.      Tenderness: There is no abdominal tenderness. There is no guarding.   Musculoskeletal:         General: Normal range of motion.      Cervical back: Normal range of motion and neck supple.      Right lower  leg: Edema (trace) present.      Left lower leg: Edema (1+; 2cm greater than R; Posterior tenderness) present.   Skin:     General: Skin is warm and dry.      Capillary Refill: Capillary refill takes less than 2 seconds.   Neurological:      General: No focal deficit present.      Mental Status: She is alert and oriented to person, place, and time.   Psychiatric:         Speech: Speech normal.         Behavior: Behavior is cooperative.         Vital Signs  ED Triage Vitals [03/28/24 2043]   Temperature Pulse Respirations Blood Pressure SpO2   97.8 °F (36.6 °C) 71 20 (!) 193/81 97 %      Temp Source Heart Rate Source Patient Position - Orthostatic VS BP Location FiO2 (%)   Tympanic Monitor Sitting Left arm --      Pain Score       9           Vitals:    03/28/24 2043   BP: (!) 193/81   Pulse: 71   Patient Position - Orthostatic VS: Sitting         Visual Acuity      ED Medications  Medications   ketorolac (TORADOL) injection 15 mg (0 mg Intravenous Hold 3/28/24 2124)   acetaminophen (TYLENOL) tablet 975 mg (0 mg Oral Hold 3/28/24 2124)       Diagnostic Studies  Results Reviewed       Procedure Component Value Units Date/Time    B-Type Natriuretic Peptide(BNP) [[id number]]  (Normal) Collected: 03/28/24 2118    Lab Status: Final result Specimen: Blood from Arm, Left Updated: 03/28/24 2148     BNP 92 pg/mL     Comprehensive metabolic panel [[id number]]  (Abnormal) Collected: 03/28/24 2118    Lab Status: Final result Specimen: Blood from Arm, Left Updated: 03/28/24 2141     Sodium 137 mmol/L      Potassium 3.9 mmol/L      Chloride 104 mmol/L      CO2 27 mmol/L      ANION GAP 6 mmol/L      BUN 19 mg/dL      Creatinine 0.73 mg/dL      Glucose 143 mg/dL      Calcium 9.5 mg/dL      AST 16 U/L      ALT 14 U/L      Alkaline Phosphatase 50 U/L      Total Protein 6.8 g/dL      Albumin 3.9 g/dL      Total Bilirubin 0.37 mg/dL      eGFR 84 ml/min/1.73sq m     Narrative:      National Kid[employer] guidelines for Chronic  Kidney Disease (CKD):     Stage 1 with normal or high GFR (GFR > 90 mL/min/1.73 square meters)    Stage 2 Mild CKD (GFR = 60-89 mL/min/1.73 square meters)    Stage 3A Moderate CKD (GFR = 45-59 mL/min/1.73 square meters)    Stage 3B Moderate CKD (GFR = 30-44 mL/min/1.73 square meters)    Stage 4 Severe CKD (GFR = 15-29 mL/min/1.73 square meters)    Stage 5 End Stage CKD (GFR <15 mL/min/1.73 square meters)  Note: GFR calculation is accurate only with a steady state creatinine    CBC and differential [[id number]] Collected: 03/28/24 2118    Lab Status: Final result Specimen: Blood from Arm, Left Updated: 03/28/24 2126     WBC 5.36 Thousand/uL      RBC 4.22 Million/uL      Hemoglobin 12.6 g/dL      Hematocrit 39.1 %      MCV 93 fL      MCH 29.9 pg      MCHC 32.2 g/dL      RDW 13.1 %      MPV 10.6 fL      Platelets 205 Thousands/uL      nRBC 0 /100 WBCs      Neutrophils Relative 54 %      Immature Grans % 0 %      Lymphocytes Relative 35 %      Monocytes Relative 9 %      Eosinophils Relative 2 %      Basophils Relative 0 %      Neutrophils Absolute 2.88 Thousands/µL      Absolute Immature Grans 0.01 Thousand/uL      Absolute Lymphocytes 1.89 Thousands/µL      Absolute Monocytes 0.46 Thousand/µL      Eosinophils Absolute 0.10 Thousand/µL      Basophils Absolute 0.02 Thousands/µL     D-Dimer [[id number]] Collected: 03/28/24 2119    Lab Status: In process Specimen: Blood from Arm, Left Updated: 03/28/24 2123                   No orders to display              Procedures  Procedures         ED Course  ED Course as of 03/28/24 2243   Thu Mar 28, 2024   2129 CBC and differential  Reviewed and without actionable derangement.    2142 Comprehensive metabolic panel(!)  Reviewed and without actionable derangement.    2149 BNP: 92  WNL                               SBIRT 20yo+      Flowsheet Row Most Recent Value   Initial Alcohol Screen: US AUDIT-C     1. How often do you have a drink containing alcohol? 0 Filed at: 03/28/2024 2045    Audit-C Score 0 Filed at: 03/28/2024 2045   BEREKET: How many times in the past year have you...    Used an illegal drug or used a prescription medication for non-medical reasons? Never Filed at: 03/28/2024 2045                      Medical Decision Making  Pt is a 67yo F who presents with L leg swelling. Exam pertinent for L leg swelling.    Differential diagnosis to include but not limited to DVT, heart failure, anemia, kidney dysfunction, electrolyte abnormality.  Will plan for labs including D-dimer.  Will plan for outpatient duplex if D-dimer positive as unable to perform duplex overnight.  Treat symptomatically.  See ED course for results and details.    Still awaiting dimer at time of sign out. Pt signed out to oncoming physician.        Amount and/or Complexity of Data Reviewed  Labs: ordered. Decision-making details documented in ED Course.    Risk  OTC drugs.  Prescription drug management.             Disposition  Final diagnoses:   Left leg swelling   Left leg pain     Time reflects when diagnosis was documented in both MDM as applicable and the Disposition within this note       Time User Action Codes Description Comment    3/28/2024 10:43 PM Rosmery Tavares Add [M79.89] Left leg swelling     3/28/2024 10:43 PM Rosmery Tavares [M79.605] Left leg pain           ED Disposition       ED Disposition   Discharge    Condition   Stable    Date/Time   Thu Mar 28, 2024 2243    Comment   Elaine Bolaños discharge to home/self care.                   Follow-up Information       Follow up With Specialties Details Why Contact Info    Joseph Benson MD  Call  As needed [address] [place] [address]  [phone]              Patient's Medications   Discharge Prescriptions    No medications on file       No discharge procedures on file.    PDMP Review         Value Time User    PDMP Reviewed  Yes 6/25/2021 10:57 AM Joseph Bearden MD            ED Provider  Electronically Signed  by             Rosmery Tavares MD  03/28/24 1721

## 2024-03-29 NOTE — DISCHARGE INSTRUCTIONS
As we discussed, please call central scheduling at [phone].  I am also giving you a paper prescription for the Eliquis should you have any DVT (blood clot in the leg).  Please take as directed.    Follow-up with your vascular doctor as well as your primary care doctor.    Return to ER if you develop any chest pain or shortness of breath.

## 2024-03-29 NOTE — ED NOTES
Pt/provider aware of BP trends, pt did take losartan today will follow up with pcp regarding blood pressure      Naomi Macias RN  03/28/24 5125

## 2024-04-10 ENCOUNTER — OFFICE VISIT (OUTPATIENT)
Dept: OBGYN CLINIC | Facility: CLINIC | Age: 69
End: 2024-04-10
Payer: COMMERCIAL

## 2024-04-10 ENCOUNTER — APPOINTMENT (OUTPATIENT)
Dept: RADIOLOGY | Facility: CLINIC | Age: 69
End: 2024-04-10
Payer: COMMERCIAL

## 2024-04-10 VITALS
WEIGHT: 205 LBS | BODY MASS INDEX: 32.95 KG/M2 | HEIGHT: 66 IN | DIASTOLIC BLOOD PRESSURE: 85 MMHG | SYSTOLIC BLOOD PRESSURE: 153 MMHG | HEART RATE: 73 BPM

## 2024-04-10 DIAGNOSIS — M77.52 LEFT ANKLE TENDINITIS: Primary | ICD-10-CM

## 2024-04-10 DIAGNOSIS — M25.572 PAIN, JOINT, ANKLE AND FOOT, LEFT: ICD-10-CM

## 2024-04-10 PROCEDURE — 99213 OFFICE O/P EST LOW 20 MIN: CPT | Performed by: ORTHOPAEDIC SURGERY

## 2024-04-10 PROCEDURE — 73610 X-RAY EXAM OF ANKLE: CPT

## 2024-04-10 NOTE — PROGRESS NOTES
Assessment/Plan:  1. Left ankle tendinitis  XR ankle 3+ vw left    Ambulatory referral to Physical Therapy          Elaine has left ankle pain without any recent trauma or injury.  She has been walking a lot and I do think that her ankle pain is consistent with ankle tendinitis.  This causes some inflammation and may be increasing her vascular issues as well.  I recommended topical anti-inflammatories, ice, physical therapy and motion as tolerated.  If the pain persists or worsens we could consider further imaging with MRI if clinically indicated.  Follow-up after therapy if pain persist.      Subjective:   Elaine Bolaños is a 68 y.o. female who presents to the office for evaluation for left-sided ankle pain.  She denies any recent injury or trauma.  She has been having diffuse discomfort throughout her ankle for the last 1 month.  She has a history of ankle issues in the past and has done well with physical therapy but the exercises she learned in the past were not significant helping.  She does have a history of vascular insufficiency and varicose veins in her lower leg.  She recently was evaluated the hospital and had a lower extremity Doppler which did not show any evidence of DVT.  She is having trouble determining if the ankle pain is causing the swelling in her veins or if the veins are causing the ankle pain.  She is planning on having surgery for varicose veins soon.  She feels an aching throbbing pain over the lateral aspect of the ankle that radiates anteriorly.  She denies any warmth erythema in the ankle.      Review of Systems   Constitutional:  Negative for chills, fever and unexpected weight change.   HENT:  Negative for hearing loss, nosebleeds and sore throat.    Eyes:  Negative for pain, redness and visual disturbance.   Respiratory:  Negative for cough, shortness of breath and wheezing.    Cardiovascular:  Negative for chest pain, palpitations and leg swelling.   Gastrointestinal:  Negative for  abdominal pain, nausea and vomiting.   Endocrine: Negative for polydipsia and polyuria.   Genitourinary:  Negative for dysuria and hematuria.   Musculoskeletal:         See HPI   Skin:  Negative for rash and wound.   Neurological:  Negative for dizziness, numbness and headaches.   Psychiatric/Behavioral:  Negative for decreased concentration and suicidal ideas. The patient is not nervous/anxious.          Past Medical History:   Diagnosis Date    BRCA1 negative     BRCA2 negative     Breast cancer (HCC)     History of chemotherapy        Past Surgical History:   Procedure Laterality Date    HYSTERECTOMY      MASTECTOMY Right     1995    MASTECTOMY Right     OOPHORECTOMY      TONSILLECTOMY      TUMOR REMOVAL      mult tumor removals from age of 14 on       Family History   Problem Relation Age of Onset    Brain cancer Mother     Lung cancer Father     Prostate cancer Father     Lung cancer Sister     Ovarian cancer Sister     No Known Problems Maternal Grandmother     No Known Problems Maternal Grandfather     Breast cancer Paternal Grandmother         unkown age    No Known Problems Paternal Grandfather     No Known Problems Brother     No Known Problems Maternal Aunt     No Known Problems Maternal Uncle     No Known Problems Paternal Aunt     No Known Problems Paternal Uncle     ADD / ADHD Neg Hx     Anesthesia problems Neg Hx     Cancer Neg Hx     Clotting disorder Neg Hx     Collagen disease Neg Hx     Diabetes Neg Hx     Dislocations Neg Hx     Learning disabilities Neg Hx     Neurological problems Neg Hx     Osteoporosis Neg Hx     Rheumatologic disease Neg Hx     Scoliosis Neg Hx     Vascular Disease Neg Hx        Social History     Occupational History    Not on file   Tobacco Use    Smoking status: Never    Smokeless tobacco: Never   Vaping Use    Vaping status: Never Used   Substance and Sexual Activity    Alcohol use: No    Drug use: No    Sexual activity: Yes     Partners: Male     Comment: rare          Current Outpatient Medications:     acetaminophen (TYLENOL) 325 mg tablet, Take 2 tablets (650 mg total) by mouth every 6 (six) hours as needed for mild pain, Disp: 100 tablet, Rfl: 5    apixaban (Eliquis) 5 mg, Take 2 tablets (10 mg total) by mouth 2 (two) times a day for 7 days, THEN 1 tablet (5 mg total) 2 (two) times a day for 23 days., Disp: 74 tablet, Rfl: 0    Apple Cider Vinegar 500 MG TABS, Take 500 mg by mouth 2 (two) times a day, Disp: , Rfl:     Ascorbic Acid (VITAMIN C) 500 MG CAPS, Take by mouth, Disp: , Rfl:     Cholecalciferol (Vitamin D3) 125 MCG (5000 UT) TABS, Take 5,000 Units by mouth daily, Disp: , Rfl:     Coenzyme Q10 (COQ-10) 10 MG CAPS, Take by mouth, Disp: , Rfl:     Cyanocobalamin (VITAMIN B 12 PO), Take by mouth, Disp: , Rfl:     diazepam (VALIUM) 5 mg tablet, Take 1 tablet (5 mg total) by mouth every 6 (six) hours as needed for anxiety, Disp: 30 tablet, Rfl: 5    EPINEPHrine (EPIPEN) 0.3 mg/0.3 mL SOAJ, Inject 0.3 mL (0.3 mg total) into a muscle as needed for anaphylaxis, Disp: 1 mL, Rfl: 5    Flax OIL, Take by mouth, Disp: , Rfl:     Iron Combinations (IRON COMPLEX PO), Take by mouth, Disp: , Rfl:     lactobacillus acidophilus, Take by mouth, Disp: , Rfl:     levothyroxine 25 mcg tablet, Take 37.5 mcg by mouth every morning Take 1-1/2 tablets by mouth every morning, Disp: , Rfl:     losartan (COZAAR) 50 mg tablet, Take 50 mg by mouth daily, Disp: , Rfl:     Magnesium Oxide 140 MG CAPS, Take by mouth, Disp: , Rfl:     patient supplied medication, Take 2 tablets by mouth daily in the early morning Super Beets nitric oxide , Disp: , Rfl:     patient supplied medication, neprinol, Disp: , Rfl:     pyridoxine (VITAMIN B6) 100 mg tablet, Take by mouth, Disp: , Rfl:     selenium 200 mcg, Take by mouth, Disp: , Rfl:     SUPER B COMPLEX/C PO, Take by mouth, Disp: , Rfl:     Turmeric 500 MG CAPS, Take by mouth, Disp: , Rfl:     Vitamin E 400 units TABS, Take by mouth, Disp: , Rfl:  "    Allergies   Allergen Reactions    Shellfish-Derived Products - Food Allergy Anaphylaxis    Augmentin [Amoxicillin-Pot Clavulanate]     Codeine Other (See Comments)     \" I can't function \"    Hydrocodone-Acetaminophen Vomiting    Levofloxacin Nausea Only and Vomiting       Objective:  Vitals:    04/10/24 1123   BP: 153/85   Pulse: 73            Left Ankle Exam     Tenderness   The patient is experiencing tenderness in the ATF and lateral malleolus (Anterior tibiotalar joint).   Swelling: mild    Range of Motion   Dorsiflexion:  normal   Plantar flexion:  normal   Eversion:  normal   Inversion:  normal     Muscle Strength   Dorsiflexion:  5/5   Plantar flexion:  5/5   Anterior tibial:  5/5   Posterior tibial:  5/5  Gastrocsoleus:  5/5  Peroneal muscle:  5/5    Other   Erythema: absent  Sensation: normal  Pulse: present          Strength/Myotome Testing     Left Ankle/Foot   Dorsiflexion: 5  Plantar flexion: 5      Physical Exam  Vitals and nursing note reviewed.   Constitutional:       Appearance: Normal appearance. She is well-developed.   HENT:      Head: Normocephalic and atraumatic.      Right Ear: External ear normal.      Left Ear: External ear normal.   Eyes:      General: No scleral icterus.     Extraocular Movements: Extraocular movements intact.      Conjunctiva/sclera: Conjunctivae normal.   Cardiovascular:      Rate and Rhythm: Normal rate.   Pulmonary:      Effort: Pulmonary effort is normal. No respiratory distress.   Musculoskeletal:      Cervical back: Normal range of motion and neck supple.      Comments: See Ortho exam   Skin:     General: Skin is warm and dry.   Neurological:      General: No focal deficit present.      Mental Status: She is alert and oriented to person, place, and time.   Psychiatric:         Behavior: Behavior normal.         I have personally reviewed pertinent films in PACS and my interpretation is as follows:  X-ray of the left ankle demonstrates no evidence of acute " abnormality.  Minimal degenerative changes most significant over the medial aspect of the ankle.      This document was created using speech voice recognition software.   Grammatical errors, random word insertions, pronoun errors, and incomplete sentences are an occasional consequence of this system due to software limitations, ambient noise, and hardware issues.   Any formal questions or concerns about content, text, or information contained within the body of this dictation should be directly addressed to the provider for clarification.

## 2024-05-23 NOTE — PROGRESS NOTES
Assessment/Plan:      Diagnoses and all orders for this visit:    Varicose veins of bilateral lower extremities with pain  -     VAS Lower extremity venous insufficiency duplex, bilateral w/ measurements; Future  -     Compression Stocking          Varicose veins of bilateral lower extremities with pain  68-year-old female here today to discuss varicose veins.  She was seen a couple years ago by Dr. Moore for varicose veins and she was managed conservatively.  She was also seen a few months ago by our vascular AP was also managed conservatively.  Today she is describing primarily right leg symptoms to me she has a history of some left leg swelling that was evaluated and was negative for DVT she was seen by an orthopedic surgeon who who diagnosed her with tendinitis in the left foot.  She does have fairly extensive spider and varicose veins in both of her lower extremities primarily in the lateral thighs and lateral calves and then she has small varicosities and spider veins extensively in both feet.  She reports some achiness in her right thigh as well as her right calf and foot she does report symptoms that are worse with standing on the right leg her left leg has similar symptoms but lesser severity.  She had a venous reflux duplex that was done in in formal way that did show great saphenous insufficiency 2 years ago but not the full reflux insufficiency study.  I discussed with her that her symptoms are not entirely typical for symptomatic varicose veins and there may very well be other etiologies to this at this point I suggested getting a formal reflux duplex and continuing to wear compression stockings.  She tells me that she does have stockings at home they are knee-high which is only type she can tolerate she did request a new prescription which I will give her today and I will have her see me back in 3 months to reassess.  I would be relatively reluctant to proceed with great saphenous closure and  phlebectomies on her I think there would be a high likelihood that she would have minimal to no symptomatic improvement with this.        Subjective:     Patient ID: Elaine Bolaños is a 68 y.o. female.    Per patient, would like to discuss R leg pain. States that she has been experiencing R leg pain and tightness and would like to discuss recommendations.     HPI    Review of Systems   Constitutional: Negative.    HENT: Negative.     Eyes: Negative.    Respiratory: Negative.     Cardiovascular: Negative.    Gastrointestinal: Negative.    Endocrine: Negative.    Genitourinary: Negative.    Musculoskeletal:         R leg pain   Skin: Negative.    Allergic/Immunologic: Negative.    Neurological: Negative.    Hematological: Negative.    Psychiatric/Behavioral: Negative.           I have reviewed the ROS above and made changes as needed.      Objective:     Physical Exam        General  Exam: alert, awake, oriented, no distress, consistent with stated age    Integumentary  Exam: warm, dry, no gross lesions, no bruises and normal color    Head and Neck  Exam: supple, no bruits, trachea midline, no JVD, no mass or palpable nodes      Chest and Lung  Exam: chest normal without deformity, bilaterally expansive, clear to auscultation    Cardiovascular  Exam: regular rate, regular rhythm, no murmurs, no rubs or gallops    Adbomen  Exam: soft, non-tender, non-distended, no pulsatile abdominal masses, no abdominal bruit    Peripheral Vascular  Exam: no clubbing of the digits of the upper extremity, no cyanosis, no edema, both feet are warm, radial pulses 2+ bilaterally, skin well perfused      Varicosities bilateral lateral calf and thigh  Extensive spider and VV in b/l feet    Upper Extremity:  Palpation: Radial pulse- Bilateral 2+    Lower Extremity:  Palpation: Femoral pulse- Bilateral 2+         Dorsalis Pedis - Bilateral 2+         Neurologic  Exam:alert, non-focal, oriented x 3, cranial nerves II-XII grossly  intact        Venous Clinical Severity Scores (VCSS)  Item Absent   (0 points) Mild   (1 point) Moderate   (2 points) Severe   (3 points)   Pain [] None [] Occasional [] Daily [x] Daily limiting   Varicose veins [] None [] Few [] Calf or thigh [x] Calf and thigh   Venous edema [] None [x] Foot and ankle [] Above ankle, below knee [] To knee of above   Skin pigmentation [] None [x] Perimalleolar [] Diffuse, lower 1/3 calf [] Wider, above lower 1/3 calf   Inflammation [] None [x] Perimalleolar [] Diffuse, lower 1/3 calf [] Wider, above lower 1/3 calf   Induration [] None [x] Perimalleolar [] Diffuse, lower 1/3 calf [] Wider, above lower 1/3 calf   No. active ulcers [] None [x] 1 [] 2 [] ?3   Active ulcer size [x] None [] <2 cm [] 2 - 6 cm [] >6 cm   Ulcer duration [x] None [] <3 months [] 3 - 12 months [] >1 year   Compression therapy [] None [] Intermittent [] Most days [x] Fully comply   Total 14          CEAP Clinical Classification  [x] Symptomatic   [] Asymptomatic     [] Class 0 No visible or palpable signs of venous disease   [] Class 1 Telangiectasies or reticular veins   [x] Class 2 Varicose veins; distinguished from reticular veins by a diameter of 3mm or more   [] Class 3 Edema   [] Class 4 Changes in skin and subcutaneous tissue secondary to CVD    [] Class 4a Pigmentation or eczema   [] Class 4b Lipodermatosclerosis or atrophie allison   [] Class 5 Healed venous ulcer   [] Class 6 Active venous ulcer     CEAP 2 LLE  CEAP 2 RLE

## 2024-05-24 ENCOUNTER — OFFICE VISIT (OUTPATIENT)
Dept: VASCULAR SURGERY | Facility: CLINIC | Age: 69
End: 2024-05-24
Payer: COMMERCIAL

## 2024-05-24 VITALS
DIASTOLIC BLOOD PRESSURE: 86 MMHG | SYSTOLIC BLOOD PRESSURE: 144 MMHG | WEIGHT: 204 LBS | BODY MASS INDEX: 32.78 KG/M2 | HEIGHT: 66 IN | HEART RATE: 64 BPM

## 2024-05-24 DIAGNOSIS — I83.813 VARICOSE VEINS OF BILATERAL LOWER EXTREMITIES WITH PAIN: Primary | ICD-10-CM

## 2024-05-24 PROCEDURE — 99215 OFFICE O/P EST HI 40 MIN: CPT | Performed by: SURGERY

## 2024-05-24 NOTE — ASSESSMENT & PLAN NOTE
68-year-old female here today to discuss varicose veins.  She was seen a couple years ago by Dr. Moore for varicose veins and she was managed conservatively.  She was also seen a few months ago by our vascular AP was also managed conservatively.  Today she is describing primarily right leg symptoms to me she has a history of some left leg swelling that was evaluated and was negative for DVT she was seen by an orthopedic surgeon who who diagnosed her with tendinitis in the left foot.  She does have fairly extensive spider and varicose veins in both of her lower extremities primarily in the lateral thighs and lateral calves and then she has small varicosities and spider veins extensively in both feet.  She reports some achiness in her right thigh as well as her right calf and foot she does report symptoms that are worse with standing on the right leg her left leg has similar symptoms but lesser severity.  She had a venous reflux duplex that was done in in formal way that did show great saphenous insufficiency 2 years ago but not the full reflux insufficiency study.  I discussed with her that her symptoms are not entirely typical for symptomatic varicose veins and there may very well be other etiologies to this at this point I suggested getting a formal reflux duplex and continuing to wear compression stockings.  She tells me that she does have stockings at home they are knee-high which is only type she can tolerate she did request a new prescription which I will give her today and I will have her see me back in 3 months to reassess.  I would be relatively reluctant to proceed with great saphenous closure and phlebectomies on her I think there would be a high likelihood that she would have minimal to no symptomatic improvement with this.

## 2024-06-11 RX ORDER — AMLODIPINE BESYLATE 2.5 MG/1
1 TABLET ORAL DAILY
COMMUNITY
Start: 2024-06-10 | End: 2024-06-14

## 2024-06-14 ENCOUNTER — OFFICE VISIT (OUTPATIENT)
Dept: CARDIOLOGY CLINIC | Facility: CLINIC | Age: 69
End: 2024-06-14
Payer: COMMERCIAL

## 2024-06-14 VITALS
DIASTOLIC BLOOD PRESSURE: 90 MMHG | WEIGHT: 205 LBS | HEART RATE: 67 BPM | BODY MASS INDEX: 32.95 KG/M2 | OXYGEN SATURATION: 99 % | SYSTOLIC BLOOD PRESSURE: 138 MMHG | HEIGHT: 66 IN

## 2024-06-14 DIAGNOSIS — I10 ESSENTIAL HYPERTENSION: Primary | ICD-10-CM

## 2024-06-14 DIAGNOSIS — I49.3 PVCS (PREMATURE VENTRICULAR CONTRACTIONS): ICD-10-CM

## 2024-06-14 DIAGNOSIS — I49.8 VENTRICULAR TRIGEMINY: ICD-10-CM

## 2024-06-14 PROCEDURE — 99214 OFFICE O/P EST MOD 30 MIN: CPT | Performed by: INTERNAL MEDICINE

## 2024-06-14 PROCEDURE — 93000 ELECTROCARDIOGRAM COMPLETE: CPT | Performed by: INTERNAL MEDICINE

## 2024-06-14 RX ORDER — LEVOTHYROXINE SODIUM 0.05 MG/1
50 TABLET ORAL EVERY MORNING
COMMUNITY
Start: 2024-05-26

## 2024-06-14 RX ORDER — LOSARTAN POTASSIUM 100 MG/1
100 TABLET ORAL DAILY
Start: 2024-06-14

## 2024-06-14 NOTE — PROGRESS NOTES
Cardiology   Romel Sanchez DO, [facility]  Justin Gerard MD, [facility]  Allan Schneider MD, [facility]  Lesli Ortega MD, [facility]  -------------------------------------------------------------------  [facility]  [address], [address], [address]  [place], [place], [address]  [phone]    Cardiology Follow Up  Elaine Bolaños  1955  [id number]          Assessment/Plan:    1. Essential hypertension    2. PVCs (premature ventricular contractions)    3. Ventricular trigeminy      -Patient with frequent PVCs noted on EKG.  Her previous Holter monitors have shown a 10% PVC burden.  Will repeat Holter monitor.  If necessary, will begin low-dose metoprolol and reduce losartan.  -Blood pressure control with losartan.  -Continue regular exercise.    Interval History:     lEaine Bolaños is 68 y.o. female here for followup of hypertension and PVCs.  The patient has been following with Dr. Schneider.  Last seen 6 months ago.  She denies any chest pain, palpitations or shortness of breath.  She has a history of frequent PVCs.  Her last Holter monitor showed a 10% PVC burden.  She has a history of hypertension.  Blood pressure has been controlled with losartan.  She previously was on amlodipine.  She is very active and does not have any symptoms with exertion.          The following portions of the patient's history were reviewed and updated as appropriate: allergies, current medications, past family history, past medical history, past social history, past surgical history, and problem list.       Current Outpatient Medications:     acetaminophen (TYLENOL) 325 mg tablet, Take 2 tablets (650 mg total) by mouth every 6 (six) hours as needed for mild pain, Disp: 100 tablet, Rfl: 5    Apple Cider Vinegar 500 MG TABS, Take 500 mg by mouth 2 (two) times a day, Disp: , Rfl:     Ascorbic Acid (VITAMIN C) 500 MG CAPS, Take by mouth, Disp: , Rfl:     Cholecalciferol (Vitamin D3) 125 MCG (5000 UT) TABS,  "Take 5,000 Units by mouth daily, Disp: , Rfl:     Coenzyme Q10 (COQ-10) 10 MG CAPS, Take by mouth, Disp: , Rfl:     Cyanocobalamin (VITAMIN B 12 PO), Take by mouth, Disp: , Rfl:     diazepam (VALIUM) 5 mg tablet, Take 1 tablet (5 mg total) by mouth every 6 (six) hours as needed for anxiety, Disp: 30 tablet, Rfl: 5    EPINEPHrine (EPIPEN) 0.3 mg/0.3 mL SOAJ, Inject 0.3 mL (0.3 mg total) into a muscle as needed for anaphylaxis, Disp: 1 mL, Rfl: 5    Flax OIL, Take by mouth, Disp: , Rfl:     Iron Combinations (IRON COMPLEX PO), Take by mouth, Disp: , Rfl:     lactobacillus acidophilus, Take by mouth, Disp: , Rfl:     levothyroxine 50 mcg tablet, Take 50 mcg by mouth every morning, Disp: , Rfl:     losartan (COZAAR) 100 MG tablet, Take 1 tablet (100 mg total) by mouth daily, Disp: , Rfl:     Magnesium Oxide 140 MG CAPS, Take by mouth, Disp: , Rfl:     patient supplied medication, Take 2 tablets by mouth daily in the early morning [employer] nitric oxide , Disp: , Rfl:     patient supplied medication, neprinol, Disp: , Rfl:     pyridoxine (VITAMIN B6) 100 mg tablet, Take by mouth, Disp: , Rfl:     selenium 200 mcg, Take by mouth, Disp: , Rfl:     SUPER B COMPLEX/C PO, Take by mouth, Disp: , Rfl:     Turmeric 500 MG CAPS, Take by mouth, Disp: , Rfl:     Vitamin E 400 units TABS, Take by mouth, Disp: , Rfl:         Review of Systems:  Review of Systems   Respiratory:  Negative for shortness of breath.    Cardiovascular:  Negative for chest pain, palpitations and leg swelling.   Musculoskeletal:  Positive for arthralgias.   All other systems reviewed and are negative.        Physical Exam:  Vitals:  Vitals:    06/14/24 1109   BP: 138/90   BP Location: Left arm   Patient Position: Sitting   Cuff Size: Standard   Pulse: 67   SpO2: 99%   Weight: 93 kg (205 lb)   Height: 5' 6\" (1.676 m)     Physical Exam   Constitutional: She appears healthy. No distress.   Eyes: Pupils are equal, round, and reactive to light. Conjunctivae are " normal.   Neck: No JVD present.   Cardiovascular: Normal rate, regular rhythm and normal heart sounds. Frequent extrasystoles are present. Exam reveals no gallop and no friction rub.   No murmur heard.  Pulmonary/Chest: Effort normal and breath sounds normal. She has no wheezes. She has no rales.   Musculoskeletal:         General: No tenderness, deformity or edema.      Cervical back: Normal range of motion and neck supple.   Neurological: She is alert and oriented to person, place, and time.   Skin: Skin is warm and dry.        Cardiographics:  EKG: Personally reviewed NSR with frequent PVCs  Last known EF: 55%    This note was completed in part utilizing M-Modal Fluency Direct Software.  Grammatical errors, random word insertions, spelling mistakes, and incomplete sentences can be an occasional consequence of this system secondary to software limitations, ambient noise, and hardware issues.  If you have any questions or concerns about the content, text, or information contained within the body of this dictation, please contact the provider for clarification.

## 2024-07-18 ENCOUNTER — HOSPITAL ENCOUNTER (OUTPATIENT)
Dept: NON INVASIVE DIAGNOSTICS | Facility: HOSPITAL | Age: 69
Discharge: HOME | End: 2024-07-18
Attending: INTERNAL MEDICINE
Payer: COMMERCIAL

## 2024-07-18 VITALS
HEIGHT: 66 IN | WEIGHT: 205 LBS | BODY MASS INDEX: 32.95 KG/M2 | HEART RATE: 72 BPM | SYSTOLIC BLOOD PRESSURE: 138 MMHG | DIASTOLIC BLOOD PRESSURE: 74 MMHG

## 2024-07-18 DIAGNOSIS — I49.3 PVCS (PREMATURE VENTRICULAR CONTRACTIONS): ICD-10-CM

## 2024-07-18 LAB
AORTIC ROOT: 3.3 CM
AV REGURGITATION PRESSURE HALF TIME: 570 MS
BSA FOR ECHO PROCEDURE: 2.02 M2
E WAVE DECELERATION TIME: 266 MS
E/A RATIO: 0.86
FRACTIONAL SHORTENING: 27 (ref 28–44)
INTERVENTRICULAR SEPTUM IN DIASTOLE (PARASTERNAL SHORT AXIS VIEW): 1.1 CM
INTERVENTRICULAR SEPTUM: 1.1 CM (ref 0.6–1.1)
LAAS-AP2: 19.5 CM2
LAAS-AP4: 24 CM2
LEFT ATRIUM SIZE: 4.6 CM
LEFT ATRIUM VOLUME (MOD BIPLANE): 69 ML
LEFT ATRIUM VOLUME INDEX (MOD BIPLANE): 34.2 ML/M2
LEFT INTERNAL DIMENSION IN SYSTOLE: 3.6 CM (ref 2.1–4)
LEFT VENTRICULAR INTERNAL DIMENSION IN DIASTOLE: 4.9 CM (ref 3.5–6)
LEFT VENTRICULAR POSTERIOR WALL IN END DIASTOLE: 1.2 CM
LEFT VENTRICULAR STROKE VOLUME: 58 ML
LVSV (TEICH): 58 ML
MV E'TISSUE VEL-LAT: 6 CM/S
MV E'TISSUE VEL-SEP: 5 CM/S
MV PEAK A VEL: 0.77 M/S
MV PEAK E VEL: 66 CM/S
MV STENOSIS PRESSURE HALF TIME: 77 MS
MV VALVE AREA P 1/2 METHOD: 2.86
RA PRESSURE ESTIMATED: 8 MMHG
RIGHT VENTRICLE ID DIMENSION: 3.3 CM
RV PSP: 40 MMHG
SL CV AV DECELERATION TIME RETROGRADE: 1966 MS
SL CV AV PEAK GRADIENT RETROGRADE: 100 MMHG
SL CV LEFT ATRIUM LENGTH A2C: 5.4 CM
SL CV LV EF: 55
SL CV PED ECHO LEFT VENTRICLE DIASTOLIC VOLUME (MOD BIPLANE) 2D: 114 ML
SL CV PED ECHO LEFT VENTRICLE SYSTOLIC VOLUME (MOD BIPLANE) 2D: 56 ML
TR MAX PG: 32 MMHG
TR PEAK VELOCITY: 2.8 M/S
TRICUSPID ANNULAR PLANE SYSTOLIC EXCURSION: 2.4 CM
TRICUSPID VALVE PEAK REGURGITATION VELOCITY: 2.81 M/S

## 2024-07-18 PROCEDURE — 93306 TTE W/DOPPLER COMPLETE: CPT

## 2024-07-18 PROCEDURE — 93306 TTE W/DOPPLER COMPLETE: CPT | Performed by: INTERNAL MEDICINE

## 2024-07-18 PROCEDURE — 93226 XTRNL ECG REC<48 HR SCAN A/R: CPT

## 2024-07-18 PROCEDURE — 93225 XTRNL ECG REC<48 HRS REC: CPT

## 2024-07-19 ENCOUNTER — NURSE TRIAGE (OUTPATIENT)
Age: 69
End: 2024-07-19

## 2024-07-19 ENCOUNTER — TELEPHONE (OUTPATIENT)
Dept: CARDIOLOGY CLINIC | Facility: CLINIC | Age: 69
End: 2024-07-19

## 2024-07-19 NOTE — TELEPHONE ENCOUNTER
Pt returned the call and stated she already s/w staff from the office regarding her results. No further questions.

## 2024-07-19 NOTE — TELEPHONE ENCOUNTER
----- Message from Romel Sanchez DO sent at 7/19/2024 12:39 PM EDT -----  Can you please let the patient know echo was normal

## 2024-07-19 NOTE — TELEPHONE ENCOUNTER
Regarding: Echo Results  ----- Message from Octavia MILLER sent at 7/19/2024  1:49 PM EDT -----  Good afternoon,    Patient called to receive results from echo. Please advise patient with results. Thank you!    Octavia Parker

## 2024-07-19 NOTE — TELEPHONE ENCOUNTER
Called pt, no answer. Left message asking pt to return call.      Saw later that office staff spoke with pt and notified her of echo results at 2:37 pm

## 2024-07-24 ENCOUNTER — TELEPHONE (OUTPATIENT)
Dept: CARDIOLOGY CLINIC | Facility: CLINIC | Age: 69
End: 2024-07-24

## 2024-07-24 DIAGNOSIS — I49.3 PVCS (PREMATURE VENTRICULAR CONTRACTIONS): Primary | ICD-10-CM

## 2024-07-24 PROCEDURE — 93227 XTRNL ECG REC<48 HR R&I: CPT | Performed by: INTERNAL MEDICINE

## 2024-07-24 RX ORDER — METOPROLOL SUCCINATE 25 MG/1
25 TABLET, EXTENDED RELEASE ORAL DAILY
Qty: 30 TABLET | Refills: 3 | Status: SHIPPED | OUTPATIENT
Start: 2024-07-24

## 2024-07-24 NOTE — TELEPHONE ENCOUNTER
----- Message from Romel Sanchez DO sent at 7/24/2024  3:43 PM EDT -----  Can you please let the patient know she did not have a lot of PVCs but she did feel some of them.

## 2024-07-24 NOTE — TELEPHONE ENCOUNTER
"I spoke with patient, made aware of results.   She wanted me to relay to you that at night when unwinding the palpitations \"hit like crazy\" this is 5 nights out of the week.     Do you think that she should be placed on a beta blocker as discussed with her prior? As far as what percentage of beats she was having PVCs.    "

## 2024-07-25 NOTE — TELEPHONE ENCOUNTER
Pt is unsure if she wants to start medication, would like to have a formal conversation with Dr. Sanchez.  Can you please schedule an appointment prior to her initiating medications,   Or DR Sanchez did you want to call patient?

## 2024-08-08 ENCOUNTER — OFFICE VISIT (OUTPATIENT)
Dept: CARDIOLOGY CLINIC | Facility: CLINIC | Age: 69
End: 2024-08-08
Payer: COMMERCIAL

## 2024-08-08 VITALS
WEIGHT: 205 LBS | SYSTOLIC BLOOD PRESSURE: 130 MMHG | DIASTOLIC BLOOD PRESSURE: 90 MMHG | OXYGEN SATURATION: 99 % | BODY MASS INDEX: 32.95 KG/M2 | HEART RATE: 69 BPM | HEIGHT: 66 IN

## 2024-08-08 DIAGNOSIS — I10 ESSENTIAL HYPERTENSION: Primary | ICD-10-CM

## 2024-08-08 DIAGNOSIS — E78.5 DYSLIPIDEMIA: ICD-10-CM

## 2024-08-08 DIAGNOSIS — I49.3 PVCS (PREMATURE VENTRICULAR CONTRACTIONS): ICD-10-CM

## 2024-08-08 PROCEDURE — 99214 OFFICE O/P EST MOD 30 MIN: CPT | Performed by: NURSE PRACTITIONER

## 2024-08-08 RX ORDER — LOSARTAN POTASSIUM 50 MG/1
50 TABLET ORAL DAILY
Qty: 90 TABLET | Refills: 1 | Status: SHIPPED | OUTPATIENT
Start: 2024-08-08

## 2024-08-08 NOTE — PROGRESS NOTES
Progress Note - Cardiology Office  [facility]    Elaine Bolaños 69 y.o. female MRN: [id number]  : 1955  Encounter: [id number]      Assessment:     1. Essential hypertension    2. PVCs (premature ventricular contractions)    3. Dyslipidemia        Discussion Summary and Plan:  1. Hypertension: blood pressures currently stable, patient was on Cozaar 100 mg daily but she is concerned with starting Toprol her blood pressure will become extremely low,    -   this time will decrease Cozaar to 50 mg daily and continue to monitor    -    follow-up in one month for blood pressure and heart rate check.    2. Dyslipidemia: continue exercise and low cholesterol diet    3. PVCs/palpitations: approximately 10% burden PVCs on recent Holter monitor    -   patient will start Toprol-XL 25 mg at bedtime and monitor symptoms      Patient / Caretaker was advised and educated to call our office  immediately if  patient has any new symptoms of chest pain/shortness of breath, near-syncope, syncope, light headedness sustained palpitations  or any other cardiovascular symptoms before their scheduled follow-up appointment.  Office number was provided #[phone].    Please call [phone] if any questions.    Counseling :  A description of the counseling.  Goals and Barriers.  Patient's ability to self care: Yes  Medication side effect reviewed with patient in detail and all their questions answered to their satisfaction.    HPI :     Elaine Bolaños is a 69 y.o. year old female who came for follow up. Patient was seen for complaints of palpitations and had undergone testing. She had 2D echocardiogram performed on 2024 which noted EF of 55% and left atrium was mildly dilated. There is mild tricuspid valve regurgitation. She also had a Holter monitor which noted approximately 10% PVC burden. There was a prescription for Toprol XL 25 mg called to her pharmacy. She was confused about how to take medication  and thrust followed up in our office today.    Discussed purpose of beta blocker and side effects. She is agreeable to start taking Toprol-XL 25 mg at bedtime and we will cut back her Cozaar to 50 mg once a day with monitoring of her blood pressure and symptoms.    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  Negative for activity change and fatigue.   HENT:  Negative for congestion, facial swelling, sinus pressure and trouble swallowing.    Eyes: Negative.  Negative for photophobia and visual disturbance.   Respiratory: Negative.  Negative for chest tightness and shortness of breath.    Cardiovascular:  Positive for palpitations. Negative for chest pain.   Gastrointestinal: Negative.  Negative for abdominal distention, constipation, nausea and vomiting.   Endocrine: Negative.  Negative for polydipsia, polyphagia and polyuria.   Genitourinary: Negative.  Negative for difficulty urinating.   Musculoskeletal: Negative.    Skin: Negative.    Neurological: Negative.  Negative for dizziness, syncope, weakness and light-headedness.   Hematological: Negative.    Psychiatric/Behavioral: Negative.         Historical Information   Past Medical History:   Diagnosis Date    BRCA1 negative     BRCA2 negative     Breast cancer (HCC)     History of chemotherapy      Past Surgical History:   Procedure Laterality Date    HYSTERECTOMY      MASTECTOMY Right     1995    MASTECTOMY Right     OOPHORECTOMY      TONSILLECTOMY      TUMOR REMOVAL      mult tumor removals from age of 14 on     Social History     Substance and Sexual Activity   Alcohol Use No     Social History     Substance and Sexual Activity   Drug Use No     Social History     Tobacco Use   Smoking Status Never   Smokeless Tobacco Never     Family History:   Family History   Problem Relation Age of Onset    Brain cancer Mother     Lung cancer Father     Prostate cancer  "Father     Lung cancer Sister     Ovarian cancer Sister     No Known Problems Maternal Grandmother     No Known Problems Maternal Grandfather     Breast cancer Paternal Grandmother         unkown age    No Known Problems Paternal Grandfather     No Known Problems Brother     No Known Problems Maternal Aunt     No Known Problems Maternal Uncle     No Known Problems Paternal Aunt     No Known Problems Paternal Uncle     ADD / ADHD Neg Hx     Anesthesia problems Neg Hx     Cancer Neg Hx     Clotting disorder Neg Hx     Collagen disease Neg Hx     Diabetes Neg Hx     Dislocations Neg Hx     Learning disabilities Neg Hx     Neurological problems Neg Hx     Osteoporosis Neg Hx     Rheumatologic disease Neg Hx     Scoliosis Neg Hx     Vascular Disease Neg Hx        Meds/Allergies     Allergies   Allergen Reactions    Shellfish-Derived Products - Food Allergy Anaphylaxis    Augmentin [Amoxicillin-Pot Clavulanate]     Codeine Other (See Comments)     \" I can't function \"    Hydrocodone-Acetaminophen Vomiting    Levofloxacin Nausea Only and Vomiting       Current Outpatient Medications:     acetaminophen (TYLENOL) 325 mg tablet, Take 2 tablets (650 mg total) by mouth every 6 (six) hours as needed for mild pain, Disp: 100 tablet, Rfl: 5    Apple Cider Vinegar 500 MG TABS, Take 500 mg by mouth 2 (two) times a day, Disp: , Rfl:     Ascorbic Acid (VITAMIN C) 500 MG CAPS, Take by mouth, Disp: , Rfl:     Cholecalciferol (Vitamin D3) 125 MCG (5000 UT) TABS, Take 5,000 Units by mouth daily, Disp: , Rfl:     Coenzyme Q10 (COQ-10) 10 MG CAPS, Take by mouth, Disp: , Rfl:     Cyanocobalamin (VITAMIN B 12 PO), Take by mouth, Disp: , Rfl:     diazepam (VALIUM) 5 mg tablet, Take 1 tablet (5 mg total) by mouth every 6 (six) hours as needed for anxiety, Disp: 30 tablet, Rfl: 5    EPINEPHrine (EPIPEN) 0.3 mg/0.3 mL SOAJ, Inject 0.3 mL (0.3 mg total) into a muscle as needed for anaphylaxis, Disp: 1 mL, Rfl: 5    Flax OIL, Take by mouth, Disp: , " "Rfl:     Iron Combinations (IRON COMPLEX PO), Take by mouth, Disp: , Rfl:     lactobacillus acidophilus, Take by mouth, Disp: , Rfl:     levothyroxine 50 mcg tablet, Take 50 mcg by mouth every morning, Disp: , Rfl:     losartan (COZAAR) 50 mg tablet, Take 1 tablet (50 mg total) by mouth daily, Disp: 90 tablet, Rfl: 1    Magnesium Oxide 140 MG CAPS, Take by mouth, Disp: , Rfl:     patient supplied medication, Take 2 tablets by mouth daily in the early morning Super Beets nitric oxide , Disp: , Rfl:     patient supplied medication, neprinol, Disp: , Rfl:     pyridoxine (VITAMIN B6) 100 mg tablet, Take by mouth, Disp: , Rfl:     selenium 200 mcg, Take by mouth, Disp: , Rfl:     SUPER B COMPLEX/C PO, Take by mouth, Disp: , Rfl:     Turmeric 500 MG CAPS, Take by mouth, Disp: , Rfl:     Vitamin E 400 units TABS, Take by mouth, Disp: , Rfl:     metoprolol succinate (TOPROL-XL) 25 mg 24 hr tablet, Take 1 tablet (25 mg total) by mouth daily (Patient not taking: Reported on 8/8/2024), Disp: 30 tablet, Rfl: 3    Vitals: Blood pressure 130/90, pulse 69, height 5' 6\" (1.676 m), weight 93 kg (205 lb), SpO2 99%.    Body mass index is 33.09 kg/m².  Wt Readings from Last 3 Encounters:   08/08/24 93 kg (205 lb)   07/18/24 93 kg (205 lb)   06/14/24 93 kg (205 lb)     Vitals:    08/08/24 1348   Weight: 93 kg (205 lb)     BP Readings from Last 3 Encounters:   08/08/24 130/90   07/18/24 138/74   06/14/24 138/90       Physical Exam:  Physical Exam  Vitals and nursing note reviewed.   Constitutional:       General: She is not in acute distress.     Appearance: Normal appearance. She is obese.   HENT:      Right Ear: External ear normal.      Left Ear: External ear normal.   Eyes:      General: No scleral icterus.        Right eye: No discharge.         Left eye: No discharge.   Cardiovascular:      Rate and Rhythm: Normal rate and regular rhythm.      Pulses: Normal pulses.      Heart sounds: Normal heart sounds.   Pulmonary:      Effort: " "Pulmonary effort is normal. No respiratory distress.      Breath sounds: Normal breath sounds.   Abdominal:      General: Bowel sounds are normal. There is no distension.      Palpations: Abdomen is soft.   Musculoskeletal:      Right lower leg: No edema.      Left lower leg: No edema.   Skin:     General: Skin is warm and dry.      Capillary Refill: Capillary refill takes less than 2 seconds.   Neurological:      General: No focal deficit present.      Mental Status: She is alert and oriented to person, place, and time. Mental status is at baseline.   Psychiatric:         Mood and Affect: Mood normal.         Result Date: 2024  Narrative: PT NAME: lEaine Bolaños : 1955  AGE: 69 y.o.  GENDER: female MRN: [id number]  PROCEDURE: Holter monitor - 24 hour INDICATIONS: 24 hour Holter monitor was performed  for evaluation of PVCs Please see narrative summary for detailed report scanned into epic     Impression: Underlying rhythm was sinus with average heart rate of 73 and range of 47 bpm at 5:13 AM to, 121 bpm There were 1099 PVCs out of which 3 were in a triplet, 24 and a couplet, 174 in bigeminy, 113 in trigeminy and the rest were isolated There were no episodes of VT There were 38 PACs out of which 5 were in a run at a rate of 132 bpm. The longest R-R interval was 1.6 seconds There was no SVT, atrial fibrillation or flutter detected Patient reported feeling off-and-on PVCs at night which corresponded to PVCs with some bradycardia on the monitor. Reported feeling some PVCs during the day with light exercise corresponded to normal sinus rhythm Interpreted by: Sara Montalvo MD, FACC \"This note has been constructed using a voice recognition system.Therefore there may be syntax, spelling, and/or grammatical errors. Please call if you have any questions. \"     Echo complete w/ contrast if indicated    Result Date: 2024  Narrative:   Left Ventricle: Left ventricular cavity size is normal. Wall thickness is " "mildly increased. The left ventricular ejection fraction is 55% by visual estimation. Systolic function is normal. Wall motion is normal. Diastolic function is mildly abnormal, consistent with grade I (abnormal) relaxation.  Left atrial filling pressure is normal.   Left Atrium: The atrium is mildly dilated (35-41 mL/m2).   Right Atrium: The atrium is mildly dilated.   Tricuspid Valve: There is mild regurgitation. The right ventricular systolic pressure is normal. The estimated right ventricular systolic pressure is 40.00 mmHg.   Pulmonic Valve: There is mild regurgitation.     Lab Review   Lab Results   Component Value Date    WBC 5.36 03/28/2024    HGB 12.6 03/28/2024    HCT 39.1 03/28/2024    MCV 93 03/28/2024    RDW 13.1 03/28/2024     03/28/2024     BMP:  Lab Results   Component Value Date    SODIUM 137 03/28/2024    K 3.9 03/28/2024     03/28/2024    CO2 27 03/28/2024    ANIONGAP 12 12/05/2013    BUN 19 03/28/2024    CREATININE 0.73 03/28/2024    GLUC 143 (H) 03/28/2024    GLUF 85 08/11/2023    CALCIUM 9.5 03/28/2024    CORRECTEDCA 9.0 06/21/2022    EGFR 84 03/28/2024    MG 2.0 01/31/2021     Troponins:    LFT:  Lab Results   Component Value Date    AST 16 03/28/2024    ALT 14 03/28/2024    ALKPHOS 50 03/28/2024    TP 6.8 03/28/2024    ALB 3.9 03/28/2024      No components found for: \"TSH3\"  Lab Results   Component Value Date    BBK3KYERBOHV 3.800 10/05/2023     No results found for: \"HGBA1C\"  Lipid Profile:   Lab Results   Component Value Date    CHOLESTEROL 221 (H) 08/11/2023    HDL 73 08/11/2023    LDLCALC 139 (H) 08/11/2023    TRIG 44 08/11/2023     Lab Results   Component Value Date    CHOLESTEROL 221 (H) 08/11/2023    CHOLESTEROL 225 (H) 11/28/2022     Lab Results   Component Value Date    TROPONINI <0.02 01/31/2021     No results found for: \"NTBNP\"   Recent Results (from the past 672 hour(s))   Echo complete w/ contrast if indicated    Collection Time: 07/18/24  1:49 PM   Result Value Ref " "Range    AV peak gradient 100 mmHg    Triscuspid Valve Regurgitation Peak Gradient 32.0 mmHg    LA Volume Index (BP) 34.2 mL/m2    MV Peak A Arjun 0.77 m/s    MV stenosis pressure 1/2 time 77 ms    MV Peak E Arjun 66 cm/s    E wave deceleration time 266 ms    E/A ratio 0.86     MV valve area p 1/2 method 2.86     AV regurgitation pressure 1/2 time 570 ms    TR Peak Arjun 2.8 m/s    RVID d 3.3 cm    Tricuspid valve peak regurgitation velocity 2.81 m/s    Left ventricular stroke volume (2D) 58.00 mL    IVSd 1.10 cm    Tricuspid annular plane systolic excursion 2.40 cm    Ao root 3.30 cm    LVPWd 1.20 cm    LA size 4.6 cm    LA volume (BP) 69 mL    FS 27 28 - 44    LVIDS 3.60 cm    IVS 1.1 cm    LVIDd 4.90 cm    LA length (A2C) 5.40 cm    AV Deceleration Time 1,966 ms    LEFT VENTRICLE SYSTOLIC VOLUME (MOD BIPLANE) 2D 56 mL    LV DIASTOLIC VOLUME (MOD BIPLANE) 2D 114 mL    Left Atrium Area-systolic Four Chamber 24 cm2    Left Atrium Area-systolic Apical Two Chamber 19.5 cm2    MV E' Tissue Velocity Lateral 6 cm/s    MV E' Tissue Velocity Septal 5 cm/s    LVSV, 2D 58 mL    BSA 2.02 m2    LV EF 55     Est. RA pres 8.0 mmHg    Right Ventricular Peak Systolic Pressure 40.00 mmHg             Karina KIM  Cardiology        \"This note was completed in part utilizing [employer] direct voice recognition software.   Grammatical errors, random word insertion, spelling mistakes, and incomplete sentences may be an occasional consequence of the system secondary to software limitations, ambient noise and hardware issues.    Please read the chart carefully and recognize, using context, where substitutions have occurred.  If you have any questions or concerns about the context, text or information contained within the body of this dictation, please contact myself, the provider, for further clarification.\"  "

## 2024-08-08 NOTE — PROGRESS NOTES
Progress Note - Cardiology Office  [facility]    Elaine Bolaños 69 y.o. female MRN: [id number]  : 1955  Encounter: [id number]      Assessment:     No diagnosis found.    Discussion Summary and Plan:      Patient / Caretaker was advised and educated to call our office  immediately if  patient has any new symptoms of chest pain/shortness of breath, near-syncope, syncope, light headedness sustained palpitations  or any other cardiovascular symptoms before their scheduled follow-up appointment.  Office number was provided #[phone].  Please call [phone] if any questions.  Counseling :  A description of the counseling.  Goals and Barriers.  Patient's ability to self care: Yes  Medication side effect reviewed with patient in detail and all their questions answered to their satisfaction.    HPI :     Elaine Bolaños is a 69 y.o. year old female who came for follow up. Patient has    Review of Systems    Historical Information   Past Medical History:   Diagnosis Date    BRCA1 negative     BRCA2 negative     Breast cancer (HCC)     History of chemotherapy      Past Surgical History:   Procedure Laterality Date    HYSTERECTOMY      MASTECTOMY Right     1995    MASTECTOMY Right     OOPHORECTOMY      TONSILLECTOMY      TUMOR REMOVAL      mult tumor removals from age of 14 on     Social History     Substance and Sexual Activity   Alcohol Use No     Social History     Substance and Sexual Activity   Drug Use No     Social History     Tobacco Use   Smoking Status Never   Smokeless Tobacco Never     Family History:   Family History   Problem Relation Age of Onset    Brain cancer Mother     Lung cancer Father     Prostate cancer Father     Lung cancer Sister     Ovarian cancer Sister     No Known Problems Maternal Grandmother     No Known Problems Maternal Grandfather     Breast cancer Paternal Grandmother         unkown age    No Known Problems Paternal Grandfather     No Known Problems  "Brother     No Known Problems Maternal Aunt     No Known Problems Maternal Uncle     No Known Problems Paternal Aunt     No Known Problems Paternal Uncle     ADD / ADHD Neg Hx     Anesthesia problems Neg Hx     Cancer Neg Hx     Clotting disorder Neg Hx     Collagen disease Neg Hx     Diabetes Neg Hx     Dislocations Neg Hx     Learning disabilities Neg Hx     Neurological problems Neg Hx     Osteoporosis Neg Hx     Rheumatologic disease Neg Hx     Scoliosis Neg Hx     Vascular Disease Neg Hx        Meds/Allergies     Allergies   Allergen Reactions    Shellfish-Derived Products - Food Allergy Anaphylaxis    Augmentin [Amoxicillin-Pot Clavulanate]     Codeine Other (See Comments)     \" I can't function \"    Hydrocodone-Acetaminophen Vomiting    Levofloxacin Nausea Only and Vomiting       Current Outpatient Medications:     acetaminophen (TYLENOL) 325 mg tablet, Take 2 tablets (650 mg total) by mouth every 6 (six) hours as needed for mild pain, Disp: 100 tablet, Rfl: 5    Apple Cider Vinegar 500 MG TABS, Take 500 mg by mouth 2 (two) times a day, Disp: , Rfl:     Ascorbic Acid (VITAMIN C) 500 MG CAPS, Take by mouth, Disp: , Rfl:     Cholecalciferol (Vitamin D3) 125 MCG (5000 UT) TABS, Take 5,000 Units by mouth daily, Disp: , Rfl:     Coenzyme Q10 (COQ-10) 10 MG CAPS, Take by mouth, Disp: , Rfl:     Cyanocobalamin (VITAMIN B 12 PO), Take by mouth, Disp: , Rfl:     diazepam (VALIUM) 5 mg tablet, Take 1 tablet (5 mg total) by mouth every 6 (six) hours as needed for anxiety, Disp: 30 tablet, Rfl: 5    EPINEPHrine (EPIPEN) 0.3 mg/0.3 mL SOAJ, Inject 0.3 mL (0.3 mg total) into a muscle as needed for anaphylaxis, Disp: 1 mL, Rfl: 5    Flax OIL, Take by mouth, Disp: , Rfl:     Iron Combinations (IRON COMPLEX PO), Take by mouth, Disp: , Rfl:     lactobacillus acidophilus, Take by mouth, Disp: , Rfl:     levothyroxine 50 mcg tablet, Take 50 mcg by mouth every morning, Disp: , Rfl:     losartan (COZAAR) 100 MG tablet, Take 1 " "tablet (100 mg total) by mouth daily, Disp: , Rfl:     Magnesium Oxide 140 MG CAPS, Take by mouth, Disp: , Rfl:     patient supplied medication, Take 2 tablets by mouth daily in the early morning Super Beets nitric oxide , Disp: , Rfl:     patient supplied medication, neprinol, Disp: , Rfl:     pyridoxine (VITAMIN B6) 100 mg tablet, Take by mouth, Disp: , Rfl:     selenium 200 mcg, Take by mouth, Disp: , Rfl:     SUPER B COMPLEX/C PO, Take by mouth, Disp: , Rfl:     Turmeric 500 MG CAPS, Take by mouth, Disp: , Rfl:     Vitamin E 400 units TABS, Take by mouth, Disp: , Rfl:     metoprolol succinate (TOPROL-XL) 25 mg 24 hr tablet, Take 1 tablet (25 mg total) by mouth daily (Patient not taking: Reported on 2024), Disp: 30 tablet, Rfl: 3    Vitals: Blood pressure 130/90, pulse 69, height 5' 6\" (1.676 m), weight 93 kg (205 lb), SpO2 99%.    Body mass index is 33.09 kg/m².  Wt Readings from Last 3 Encounters:   24 93 kg (205 lb)   24 93 kg (205 lb)   24 93 kg (205 lb)     Vitals:    24 1348   Weight: 93 kg (205 lb)     BP Readings from Last 3 Encounters:   24 130/90   24 138/74   24 138/90       Physical Exam:  Physical Exam      Diagnostic Studies Review Cardio:  EKG:      Holter monitor    Result Date: 2024  Narrative: PT NAME: Elaine Bolaños : 1955  AGE: 69 y.o.  GENDER: female MRN: [id number]  PROCEDURE: Holter monitor - 24 hour INDICATIONS: 24 hour Holter monitor was performed  for evaluation of PVCs Please see narrative summary for detailed report scanned into epic     Impression: Underlying rhythm was sinus with average heart rate of 73 and range of 47 bpm at 5:13 AM to, 121 bpm There were 1099 PVCs out of which 3 were in a triplet, 24 and a couplet, 174 in bigeminy, 113 in trigeminy and the rest were isolated There were no episodes of VT There were 38 PACs out of which 5 were in a run at a rate of 132 bpm. The longest R-R interval was 1.6 seconds There was " "no SVT, atrial fibrillation or flutter detected Patient reported feeling off-and-on PVCs at night which corresponded to PVCs with some bradycardia on the monitor. Reported feeling some PVCs during the day with light exercise corresponded to normal sinus rhythm Interpreted by: Sara Montalvo MD, [facility] \"This note has been constructed using a voice recognition system.Therefore there may be syntax, spelling, and/or grammatical errors. Please call if you have any questions. \"     Echo complete w/ contrast if indicated    Result Date: 7/18/2024  Narrative:   Left Ventricle: Left ventricular cavity size is normal. Wall thickness is mildly increased. The left ventricular ejection fraction is 55% by visual estimation. Systolic function is normal. Wall motion is normal. Diastolic function is mildly abnormal, consistent with grade I (abnormal) relaxation.  Left atrial filling pressure is normal.   Left Atrium: The atrium is mildly dilated (35-41 mL/m2).   Right Atrium: The atrium is mildly dilated.   Tricuspid Valve: There is mild regurgitation. The right ventricular systolic pressure is normal. The estimated right ventricular systolic pressure is 40.00 mmHg.   Pulmonic Valve: There is mild regurgitation.         Karina KIM  Cardiology         \"This note was completed in part utilizing [employer] direct voice recognition software.   Grammatical errors, random word insertion, spelling mistakes, and incomplete sentences may be an occasional consequence of the system secondary to software limitations, ambient noise and hardware issues.    Please read the chart carefully and recognize, using context, where substitutions have occurred.  If you have any questions or concerns about the context, text or information contained within the body of this dictation, please contact myself, the provider, for further clarification.\"  "

## 2024-08-12 ENCOUNTER — TELEPHONE (OUTPATIENT)
Age: 69
End: 2024-08-12

## 2024-08-12 NOTE — TELEPHONE ENCOUNTER
Per a article in ophthalmology today all antihypertensive medications have potential to cause macular degeneration. There unfortunately is not an alternative medication for her to take for her PVCs. I would suggest she start the low dose Toprol-XL and we can monitor her vision with her eye doctor. Thanks

## 2024-08-12 NOTE — TELEPHONE ENCOUNTER
Received call from pt.  She calls regarding new medication prescribed by Karina KIM, metoprolol succinate.  Pt states she read that it can cause AMD, and pt states she already has macular degeneration.  She is asking if there is an alternative medication to take or another way to handle what is going on, such as relieve stress.  Pt states she is also going for vascular testing at end of the month.    Please advise.

## 2024-08-12 NOTE — TELEPHONE ENCOUNTER
I spoke with patient, made aware of Karina's message. She will be contacting her retina specialist and contemplating this. She will update our office.

## 2024-08-26 ENCOUNTER — HOSPITAL ENCOUNTER (OUTPATIENT)
Dept: RADIOLOGY | Facility: HOSPITAL | Age: 69
Discharge: HOME | End: 2024-08-26
Attending: SURGERY
Payer: COMMERCIAL

## 2024-08-26 DIAGNOSIS — I83.813 VARICOSE VEINS OF BILATERAL LOWER EXTREMITIES WITH PAIN: ICD-10-CM

## 2024-08-26 PROCEDURE — 93970 EXTREMITY STUDY: CPT

## 2024-08-27 PROCEDURE — 93971 EXTREMITY STUDY: CPT | Performed by: SURGERY

## 2024-08-28 ENCOUNTER — TELEPHONE (OUTPATIENT)
Dept: VASCULAR SURGERY | Facility: CLINIC | Age: 69
End: 2024-08-28

## 2024-08-28 NOTE — TELEPHONE ENCOUNTER
Called pt and lmom to inform them that FE[facility] will not be in the office for their appt and we are calling to reschedule

## 2024-08-29 ENCOUNTER — TELEPHONE (OUTPATIENT)
Age: 69
End: 2024-08-29

## 2024-08-29 NOTE — TELEPHONE ENCOUNTER
I rescheduled the pt's appt to 10/25. Since this is much later than the original appointment, she would like someone to call her with her testing results in the meantime.

## 2024-09-10 ENCOUNTER — OFFICE VISIT (OUTPATIENT)
Dept: CARDIOLOGY CLINIC | Facility: CLINIC | Age: 69
End: 2024-09-10
Payer: COMMERCIAL

## 2024-09-10 VITALS
SYSTOLIC BLOOD PRESSURE: 128 MMHG | WEIGHT: 205 LBS | OXYGEN SATURATION: 99 % | HEART RATE: 66 BPM | HEIGHT: 66 IN | BODY MASS INDEX: 32.95 KG/M2 | DIASTOLIC BLOOD PRESSURE: 82 MMHG

## 2024-09-10 DIAGNOSIS — I10 ESSENTIAL HYPERTENSION: ICD-10-CM

## 2024-09-10 DIAGNOSIS — I49.3 PVCS (PREMATURE VENTRICULAR CONTRACTIONS): Primary | ICD-10-CM

## 2024-09-10 DIAGNOSIS — E78.5 DYSLIPIDEMIA: ICD-10-CM

## 2024-09-10 PROCEDURE — 99213 OFFICE O/P EST LOW 20 MIN: CPT | Performed by: INTERNAL MEDICINE

## 2024-09-10 RX ORDER — AMLODIPINE BESYLATE 2.5 MG/1
2.5 TABLET ORAL DAILY
COMMUNITY
Start: 2024-08-15 | End: 2024-11-13

## 2024-09-10 NOTE — PROGRESS NOTES
Cardiology   Romel Sanchez DO, [facility]  Justin Gerard MD, [facility]  Allan Schneider MD, [facility]  Lesli Ortega MD, [facility]  -------------------------------------------------------------------  [facility]  [address], [address], [address]  [place], [place], [address]  [phone]    Cardiology Follow Up  Elaine Bolaños  1955  [id number]          Assessment/Plan:    1. PVCs (premature ventricular contractions)    2. Essential hypertension    3. Dyslipidemia      -Patient with frequent PVCs noted on EKG.  Her previous Holter monitors have shown a 10% PVC burden but most recent Holter monitor showed 2% PVC burden.  Metoprolol was recommended afterwards but she did not wish to take it.  Blood pressure is controlled with amlodipine and losartan.  Palpitations have been manageable.  -If palpitations increase in frequency or if any symptoms of near syncope or shortness of breath, she should call and can consider switching medication at that time.  -Continue losartan and amlodipine  -Continue regular exercise.  -Follow-up in April 2025    Interval History:     Elaine Bolaños is 69 y.o. female here for followup of hypertension and PVCs.  She was complaining of increased palpitations and had PVCs on last ECG in office.  Holter monitor and echocardiogram were ordered.  Holter monitor showed 2% PVC burden but she did have symptoms during some of the PVCs.  Echocardiogram was normal.  Afterwards, metoprolol was recommended but she did not wish to take it.  She is using losartan and amlodipine and was also started on antianxiety medication by her primary medical doctor.  She has intermittent palpitations but overall is feeling well.  She has no history of syncope or near syncope.  Blood pressure controlled with current regimen.  She is very active and does not have any symptoms with exertion.          The following portions of the patient's history were reviewed and updated as  appropriate: allergies, current medications, past family history, past medical history, past social history, past surgical history, and problem list.       Current Outpatient Medications:     acetaminophen (TYLENOL) 325 mg tablet, Take 2 tablets (650 mg total) by mouth every 6 (six) hours as needed for mild pain, Disp: 100 tablet, Rfl: 5    amLODIPine (NORVASC) 2.5 mg tablet, Take 2.5 mg by mouth daily, Disp: , Rfl:     Apple Cider Vinegar 500 MG TABS, Take 500 mg by mouth 2 (two) times a day, Disp: , Rfl:     Ascorbic Acid (VITAMIN C) 500 MG CAPS, Take by mouth, Disp: , Rfl:     Cholecalciferol (Vitamin D3) 125 MCG (5000 UT) TABS, Take 5,000 Units by mouth daily, Disp: , Rfl:     Coenzyme Q10 (COQ-10) 10 MG CAPS, Take by mouth, Disp: , Rfl:     Cyanocobalamin (VITAMIN B 12 PO), Take by mouth, Disp: , Rfl:     diazepam (VALIUM) 5 mg tablet, Take 1 tablet (5 mg total) by mouth every 6 (six) hours as needed for anxiety, Disp: 30 tablet, Rfl: 5    Flax OIL, Take by mouth, Disp: , Rfl:     Iron Combinations (IRON COMPLEX PO), Take by mouth, Disp: , Rfl:     lactobacillus acidophilus, Take by mouth, Disp: , Rfl:     levothyroxine 50 mcg tablet, Take 50 mcg by mouth every morning, Disp: , Rfl:     losartan (COZAAR) 50 mg tablet, Take 1 tablet (50 mg total) by mouth daily, Disp: 90 tablet, Rfl: 1    Magnesium Oxide 140 MG CAPS, Take by mouth, Disp: , Rfl:     patient supplied medication, Take 2 tablets by mouth daily in the early morning Super Beets nitric oxide , Disp: , Rfl:     patient supplied medication, neprinol, Disp: , Rfl:     pyridoxine (VITAMIN B6) 100 mg tablet, Take by mouth, Disp: , Rfl:     selenium 200 mcg, Take by mouth, Disp: , Rfl:     sertraline (ZOLOFT) 50 mg tablet, Take 50 mg by mouth daily, Disp: , Rfl:     SUPER B COMPLEX/C PO, Take by mouth, Disp: , Rfl:     Turmeric 500 MG CAPS, Take by mouth, Disp: , Rfl:     Vitamin E 400 units TABS, Take by mouth, Disp: , Rfl:     EPINEPHrine (EPIPEN) 0.3  "mg/0.3 mL SOAJ, Inject 0.3 mL (0.3 mg total) into a muscle as needed for anaphylaxis (Patient not taking: Reported on 9/10/2024), Disp: 1 mL, Rfl: 5        Review of Systems:  Review of Systems   Respiratory:  Negative for shortness of breath.    Cardiovascular:  Positive for palpitations. Negative for chest pain and leg swelling.   All other systems reviewed and are negative.        Physical Exam:  Vitals:  Vitals:    09/10/24 1059   BP: 128/82   BP Location: Left arm   Patient Position: Sitting   Cuff Size: Large   Pulse: 66   SpO2: 99%   Weight: 93 kg (205 lb)   Height: 5' 6\" (1.676 m)     Physical Exam   Constitutional: She appears healthy. No distress.   Eyes: Pupils are equal, round, and reactive to light. Conjunctivae are normal.   Neck: No JVD present.   Cardiovascular: Normal rate, regular rhythm and normal heart sounds. Frequent extrasystoles are present. Exam reveals no gallop and no friction rub.   No murmur heard.  Pulmonary/Chest: Effort normal and breath sounds normal. She has no wheezes. She has no rales.   Musculoskeletal:         General: No tenderness, deformity or edema.      Cervical back: Normal range of motion and neck supple.   Neurological: She is alert and oriented to person, place, and time.   Skin: Skin is warm and dry.        Cardiographics:  See above  Last known EF: 55%    This note was completed in part utilizing M-Modal Fluency Direct Software.  Grammatical errors, random word insertions, spelling mistakes, and incomplete sentences can be an occasional consequence of this system secondary to software limitations, ambient noise, and hardware issues.  If you have any questions or concerns about the content, text, or information contained within the body of this dictation, please contact the provider for clarification.      "

## 2024-10-09 ENCOUNTER — OFFICE VISIT (OUTPATIENT)
Dept: OBGYN CLINIC | Facility: CLINIC | Age: 69
End: 2024-10-09
Payer: COMMERCIAL

## 2024-10-09 ENCOUNTER — APPOINTMENT (OUTPATIENT)
Dept: RADIOLOGY | Facility: CLINIC | Age: 69
End: 2024-10-09
Payer: COMMERCIAL

## 2024-10-09 VITALS
WEIGHT: 205 LBS | HEART RATE: 60 BPM | DIASTOLIC BLOOD PRESSURE: 84 MMHG | HEIGHT: 66 IN | BODY MASS INDEX: 32.95 KG/M2 | SYSTOLIC BLOOD PRESSURE: 143 MMHG

## 2024-10-09 DIAGNOSIS — M25.571 RIGHT ANKLE PAIN, UNSPECIFIED CHRONICITY: ICD-10-CM

## 2024-10-09 DIAGNOSIS — S86.819A STRAIN OF CALF MUSCLE, INITIAL ENCOUNTER: ICD-10-CM

## 2024-10-09 DIAGNOSIS — M76.822 TIBIALIS POSTERIOR TENDONITIS, LEFT: Primary | ICD-10-CM

## 2024-10-09 PROCEDURE — 73610 X-RAY EXAM OF ANKLE: CPT

## 2024-10-09 PROCEDURE — 99203 OFFICE O/P NEW LOW 30 MIN: CPT | Performed by: ORTHOPAEDIC SURGERY

## 2024-10-09 NOTE — PROGRESS NOTES
Assessment/Plan:  1. Tibialis posterior tendonitis, left  XR ankle 3+ vw right      2. Strain of calf muscle, initial encounter            Elaine has left-sided foot and ankle pain with discomfort along the posterior tibialis tendon and medial gastroc.  It appears she has strained this area with increased activity over the last week.  I discussed with her that calf pain can often be initial presentation of a DVT but she does not have any other alarming factors.  She was counseled on any developing signs of increased pain or warmth and we will send her directly for a ultrasound of her calf.  She was advised to undergo home exercises at this point for her posterior tibialis tendon and avoid using the nighttime splint.  She agreed with this plan.  Follow-up with me as needed.      Subjective:   Elaine Bolaoñs is a 69 y.o. female who presents to the office for cute onset of left-sided leg and calf and ankle pain.  She denies any injury or trauma but she has been very active doing a lot of work at home and in the yard.  She feels like she may have overdone it.  She feels pain in the medial aspect of the ankle rating up into the calf region.  She denies any swelling or bruising.  She denies any warmth or erythema in this area.  She does have a history of varicosities and has had multiple screenings fo DVT but never a positive DVT. she does have a history of plantar fasciitis in this foot and has been using a plantar fascia nighttime splint.  She does have a history of flatfoot and has arch inserts in her shoes and has been wearing shoes indoors.    Review of Systems   Constitutional:  Negative for chills, fever and unexpected weight change.   HENT:  Negative for hearing loss, nosebleeds and sore throat.    Eyes:  Negative for pain, redness and visual disturbance.   Respiratory:  Negative for cough, shortness of breath and wheezing.    Cardiovascular:  Negative for chest pain, palpitations and leg swelling.    Gastrointestinal:  Negative for abdominal pain, nausea and vomiting.   Endocrine: Negative for polydipsia and polyuria.   Genitourinary:  Negative for dysuria and hematuria.   Musculoskeletal:         See HPI   Skin:  Negative for rash and wound.   Neurological:  Negative for dizziness, numbness and headaches.   Psychiatric/Behavioral:  Negative for decreased concentration and suicidal ideas. The patient is not nervous/anxious.          Past Medical History:   Diagnosis Date    BRCA1 negative     BRCA2 negative     Breast cancer (HCC)     History of chemotherapy        Past Surgical History:   Procedure Laterality Date    HYSTERECTOMY      MASTECTOMY Right     1995    MASTECTOMY Right     OOPHORECTOMY      TONSILLECTOMY      TUMOR REMOVAL      mult tumor removals from age of 14 on       Family History   Problem Relation Age of Onset    Brain cancer Mother     Lung cancer Father     Prostate cancer Father     Lung cancer Sister     Ovarian cancer Sister     No Known Problems Maternal Grandmother     No Known Problems Maternal Grandfather     Breast cancer Paternal Grandmother         unkown age    No Known Problems Paternal Grandfather     No Known Problems Brother     No Known Problems Maternal Aunt     No Known Problems Maternal Uncle     No Known Problems Paternal Aunt     No Known Problems Paternal Uncle     ADD / ADHD Neg Hx     Anesthesia problems Neg Hx     Cancer Neg Hx     Clotting disorder Neg Hx     Collagen disease Neg Hx     Diabetes Neg Hx     Dislocations Neg Hx     Learning disabilities Neg Hx     Neurological problems Neg Hx     Osteoporosis Neg Hx     Rheumatologic disease Neg Hx     Scoliosis Neg Hx     Vascular Disease Neg Hx        Social History     Occupational History    Not on file   Tobacco Use    Smoking status: Never    Smokeless tobacco: Never   Vaping Use    Vaping status: Never Used   Substance and Sexual Activity    Alcohol use: No    Drug use: No    Sexual activity: Yes      Partners: Male     Comment: rare         Current Outpatient Medications:     acetaminophen (TYLENOL) 325 mg tablet, Take 2 tablets (650 mg total) by mouth every 6 (six) hours as needed for mild pain, Disp: 100 tablet, Rfl: 5    amLODIPine (NORVASC) 2.5 mg tablet, Take 2.5 mg by mouth daily, Disp: , Rfl:     Apple Cider Vinegar 500 MG TABS, Take 500 mg by mouth 2 (two) times a day, Disp: , Rfl:     Ascorbic Acid (VITAMIN C) 500 MG CAPS, Take by mouth, Disp: , Rfl:     Cholecalciferol (Vitamin D3) 125 MCG (5000 UT) TABS, Take 5,000 Units by mouth daily, Disp: , Rfl:     Coenzyme Q10 (COQ-10) 10 MG CAPS, Take by mouth, Disp: , Rfl:     Cyanocobalamin (VITAMIN B 12 PO), Take by mouth, Disp: , Rfl:     diazepam (VALIUM) 5 mg tablet, Take 1 tablet (5 mg total) by mouth every 6 (six) hours as needed for anxiety, Disp: 30 tablet, Rfl: 5    EPINEPHrine (EPIPEN) 0.3 mg/0.3 mL SOAJ, Inject 0.3 mL (0.3 mg total) into a muscle as needed for anaphylaxis (Patient not taking: Reported on 9/10/2024), Disp: 1 mL, Rfl: 5    Flax OIL, Take by mouth, Disp: , Rfl:     Iron Combinations (IRON COMPLEX PO), Take by mouth, Disp: , Rfl:     lactobacillus acidophilus, Take by mouth, Disp: , Rfl:     levothyroxine 50 mcg tablet, Take 50 mcg by mouth every morning, Disp: , Rfl:     losartan (COZAAR) 50 mg tablet, Take 1 tablet (50 mg total) by mouth daily, Disp: 90 tablet, Rfl: 1    Magnesium Oxide 140 MG CAPS, Take by mouth, Disp: , Rfl:     patient supplied medication, Take 2 tablets by mouth daily in the early morning Super Beets nitric oxide , Disp: , Rfl:     patient supplied medication, neprinol, Disp: , Rfl:     pyridoxine (VITAMIN B6) 100 mg tablet, Take by mouth, Disp: , Rfl:     selenium 200 mcg, Take by mouth, Disp: , Rfl:     sertraline (ZOLOFT) 50 mg tablet, Take 50 mg by mouth daily, Disp: , Rfl:     SUPER B COMPLEX/C PO, Take by mouth, Disp: , Rfl:     Turmeric 500 MG CAPS, Take by mouth, Disp: , Rfl:     Vitamin E 400 units TABS,  "Take by mouth, Disp: , Rfl:     Allergies   Allergen Reactions    Shellfish-Derived Products - Food Allergy Anaphylaxis    Augmentin [Amoxicillin-Pot Clavulanate]     Codeine Other (See Comments)     \" I can't function \"    Hydrocodone-Acetaminophen Vomiting    Levofloxacin Nausea Only and Vomiting       Objective:  Vitals:    10/09/24 1024   BP: 143/84   Pulse: 60            Right Ankle Exam     Tenderness   Right ankle tenderness location: Tenderness over posterior tibialis tendon just posterior to medial malleolus as well as pinpoint tenderness in her medial calf.  No warmth or erythema.  Swelling: none    Range of Motion   Dorsiflexion:  normal   Plantar flexion:  normal   Eversion:  normal   Inversion:  normal     Muscle Strength   Dorsiflexion:  5/5  Plantar flexion:  5/5  Anterior tibial:  5/5  Posterior tibial:  5/5  Gastrocsoleus:  5/5  Peroneal muscle:  5/5    Tests   Anterior drawer: negative    Other   Erythema: absent  Sensation: normal  Pulse: present           Strength/Myotome Testing     Right Ankle/Foot   Dorsiflexion: 5  Plantar flexion: 5      Physical Exam  Vitals and nursing note reviewed.   Constitutional:       Appearance: Normal appearance. She is well-developed.   HENT:      Head: Normocephalic and atraumatic.      Right Ear: External ear normal.      Left Ear: External ear normal.   Eyes:      General: No scleral icterus.     Extraocular Movements: Extraocular movements intact.      Conjunctiva/sclera: Conjunctivae normal.   Cardiovascular:      Rate and Rhythm: Normal rate.   Pulmonary:      Effort: Pulmonary effort is normal. No respiratory distress.   Musculoskeletal:      Cervical back: Normal range of motion and neck supple.      Comments: See Ortho exam   Skin:     General: Skin is warm and dry.   Neurological:      General: No focal deficit present.      Mental Status: She is alert and oriented to person, place, and time.   Psychiatric:         Behavior: Behavior normal.       I have " personally reviewed pertinent films in PACS and my interpretation is as follows:  Stray of the right ankle demonstrates no evidence of acute fracture or significant degenerative change.      This document was created using speech voice recognition software.   Grammatical errors, random word insertions, pronoun errors, and incomplete sentences are an occasional consequence of this system due to software limitations, ambient noise, and hardware issues.   Any formal questions or concerns about content, text, or information contained within the body of this dictation should be directly addressed to the provider for clarification.

## 2024-10-25 ENCOUNTER — OFFICE VISIT (OUTPATIENT)
Dept: VASCULAR SURGERY | Facility: CLINIC | Age: 69
End: 2024-10-25
Payer: COMMERCIAL

## 2024-10-25 VITALS
DIASTOLIC BLOOD PRESSURE: 70 MMHG | HEIGHT: 66 IN | SYSTOLIC BLOOD PRESSURE: 110 MMHG | WEIGHT: 205 LBS | HEART RATE: 69 BPM | BODY MASS INDEX: 32.95 KG/M2

## 2024-10-25 DIAGNOSIS — I83.813 VARICOSE VEINS OF BILATERAL LOWER EXTREMITIES WITH PAIN: Primary | ICD-10-CM

## 2024-10-25 PROCEDURE — 99215 OFFICE O/P EST HI 40 MIN: CPT | Performed by: SURGERY

## 2024-10-25 NOTE — ASSESSMENT & PLAN NOTE
69-year-old female presents for follow-up for varicose veins.  I saw her several months ago when she was describing symptoms that time that were not convincingly related to her varicosities.  She does have extensive bilateral varicose veins that are pretty evenly distributed in both of her thighs and calves as well as some extensive spider veins in both of her feet.  She has subsequently seen a orthopedist who diagnosed her with what sounds like tendinitis of her Achilles or ankle ligaments.  And since getting physical therapy for this she is much improved.  She still does have some leg heaviness that gets worse at the end of the day but she does not have any focal symptoms related to her varicosities.  I reviewed her results of her lower extremity venous reflux duplex with her and she has bilateral great saphenous vein insufficiency with no DVT given the fact that she still does have some leg heaviness that is worse at the end of the day it would be reasonable to proceed with bilateral great saphenous vein EVLT.  Because her symptoms are really not focal to her varicosities and they are very diffuse I would not proceed with phlebectomies at this time and instead just treat her with EVLT and see how she does after this.  She has been compliant with her stockings and at this time she is not ready to be scheduled for procedure we will schedule her as needed with me and if she changes her mind and wishes to be scheduled she can be scheduled for bilateral great saphenous vein EVLT's without phlebectomies and would not require any clearances.  She will contact us if she wishes to be scheduled.

## 2024-10-25 NOTE — PROGRESS NOTES
Ambulatory Visit  Name: Elaine Bolaños      : 1955      MRN: [id number]  Encounter Provider: Anthony Dow DO  Encounter Date: 10/25/2024   Encounter department: [facility]    Assessment & Plan    Varicose veins of bilateral lower extremities with pain  69-year-old female presents for follow-up for varicose veins.  I saw her several months ago when she was describing symptoms that time that were not convincingly related to her varicosities.  She does have extensive bilateral varicose veins that are pretty evenly distributed in both of her thighs and calves as well as some extensive spider veins in both of her feet.  She has subsequently seen a orthopedist who diagnosed her with what sounds like tendinitis of her Achilles or ankle ligaments.  And since getting physical therapy for this she is much improved.  She still does have some leg heaviness that gets worse at the end of the day but she does not have any focal symptoms related to her varicosities.  I reviewed her results of her lower extremity venous reflux duplex with her and she has bilateral great saphenous vein insufficiency with no DVT given the fact that she still does have some leg heaviness that is worse at the end of the day it would be reasonable to proceed with bilateral great saphenous vein EVLT.  Because her symptoms are really not focal to her varicosities and they are very diffuse I would not proceed with phlebectomies at this time and instead just treat her with EVLT and see how she does after this.  She has been compliant with her stockings and at this time she is not ready to be scheduled for procedure we will schedule her as needed with me and if she changes her mind and wishes to be scheduled she can be scheduled for bilateral great saphenous vein EVLT's without phlebectomies and would not require any clearances.  She will contact us if she wishes to be scheduled.        History of Present Illness     Elaine Bolaños  is a 69 y.o. female     Patient presents for eval of b/l Varicose Veins R>L. Patient reports swelling has improved. Patient does wear compression.     History obtained from : patient  Review of Systems   Constitutional: Negative.    HENT: Negative.     Eyes: Negative.    Respiratory: Negative.     Cardiovascular: Negative.    Gastrointestinal: Negative.    Endocrine: Negative.    Genitourinary: Negative.    Musculoskeletal: Negative.    Skin: Negative.    Allergic/Immunologic: Negative.    Neurological: Negative.    Hematological: Negative.    Psychiatric/Behavioral: Negative.     Medical History Reviewed by provider this encounter:       I have reviewed the ROS above and made changes as needed.        Objective     There were no vitals taken for this visit.    Physical Exam    General  Exam: alert, awake, oriented, no distress, consistent with stated age    Integumentary  Exam: warm, dry, no gross lesions, no bruises and normal color    Head and Neck  Exam: supple, no bruits, trachea midline, no JVD, no mass or palpable nodes    Chest and Lung  Exam: chest normal without deformity, bilaterally expansive, clear to auscultation    Cardiovascular  Exam: regular rate, regular rhythm, no murmurs, no rubs or gallops    Adbomen  Exam: soft, non-tender, non-distended, no pulsatile abdominal masses, no abdominal bruit    Peripheral Vascular  Exam:     Scattered bilateral thigh and calf bilateral varicsoities  Extensive spider veins bilateral feet      Upper Extremity:  Palpation: Radial pulse- Bilateral 2+    Lower Extremity:  Palpation: Femoral pulse- Bilateral 2+         Popliteal pulse - Bilateral 2+        Dorsalis Pedis - Bilateral 2+                Neurologic  Exam:alert, non-focal, oriented x 3, cranial nerves II-XII grossly intact        Venous Clinical Severity Scores (VCSS)  Item Absent   (0 points) Mild   (1 point) Moderate   (2 points) Severe   (3 points)   Pain [] None [] Occasional [] Daily [x] Daily limiting    Varicose veins [] None [] Few [] Calf or thigh [x] Calf and thigh   Venous edema [x] None [] Foot and ankle [] Above ankle, below knee [] To knee of above   Skin pigmentation [x] None [] Perimalleolar [] Diffuse, lower 1/3 calf [] Wider, above lower 1/3 calf   Inflammation [x] None [] Perimalleolar [] Diffuse, lower 1/3 calf [] Wider, above lower 1/3 calf   Induration [x] None [] Perimalleolar [] Diffuse, lower 1/3 calf [] Wider, above lower 1/3 calf   No. active ulcers [x] None [] 1 [] 2 [] >=3   Active ulcer size [x] None [] <2 cm [] 2 - 6 cm [] >6 cm   Ulcer duration [x] None [] <3 months [] 3 - 12 months [] >1 year   Compression therapy [] None [] Intermittent [] Most days [x] Fully comply   Total 9          CEAP Clinical Classification  [x] Symptomatic   [] Asymptomatic     [] Class 0 No visible or palpable signs of venous disease   [] Class 1 Telangiectasies or reticular veins   [x] Class 2 Varicose veins; distinguished from reticular veins by a diameter of 3mm or more   [] Class 3 Edema   [] Class 4 Changes in skin and subcutaneous tissue secondary to CVD    [] Class 4a Pigmentation or eczema   [] Class 4b Lipodermatosclerosis or atrophie allison   [] Class 5 Healed venous ulcer   [] Class 6 Active venous ulcer       RLE CEAP 2  LLE CEAP 2        Administrative Statements   I have spent a total time of 30 minutes in caring for this patient on the day of the visit/encounter including Diagnostic results, Prognosis, Risks and benefits of tx options, Instructions for management, and Importance of tx compliance.

## 2024-12-05 ENCOUNTER — HOSPITAL ENCOUNTER (OUTPATIENT)
Dept: RADIOLOGY | Facility: HOSPITAL | Age: 69
Discharge: HOME | End: 2024-12-05
Payer: COMMERCIAL

## 2024-12-05 VITALS — BODY MASS INDEX: 31.82 KG/M2 | HEIGHT: 66 IN | WEIGHT: 198 LBS

## 2024-12-05 DIAGNOSIS — N64.4 BREAST PAIN: ICD-10-CM

## 2024-12-05 PROCEDURE — G0279 TOMOSYNTHESIS, MAMMO: HCPCS

## 2024-12-05 PROCEDURE — 76642 ULTRASOUND BREAST LIMITED: CPT

## 2024-12-05 PROCEDURE — 77065 DX MAMMO INCL CAD UNI: CPT

## 2025-02-21 ENCOUNTER — HOSPITAL ENCOUNTER (OUTPATIENT)
Dept: RADIOLOGY | Facility: HOSPITAL | Age: 70
Discharge: HOME | End: 2025-02-21
Payer: COMMERCIAL

## 2025-02-21 DIAGNOSIS — N64.4 MASTODYNIA OF LEFT BREAST: ICD-10-CM

## 2025-02-21 DIAGNOSIS — R22.30 AXILLARY FULLNESS: ICD-10-CM

## 2025-02-21 PROCEDURE — C8937 CAD BREAST MRI: HCPCS

## 2025-02-21 PROCEDURE — C8908 MRI W/O FOL W/CONT, BREAST,: HCPCS

## 2025-02-21 PROCEDURE — A9585 GADOBUTROL INJECTION: HCPCS | Performed by: RADIOLOGY

## 2025-02-21 RX ORDER — GADOBUTROL 604.72 MG/ML
9 INJECTION INTRAVENOUS
Status: COMPLETED | OUTPATIENT
Start: 2025-02-21 | End: 2025-02-21

## 2025-02-21 RX ADMIN — GADOBUTROL 9 ML: 604.72 INJECTION INTRAVENOUS at 13:27

## 2025-03-05 ENCOUNTER — OFFICE VISIT (OUTPATIENT)
Dept: OBGYN CLINIC | Facility: CLINIC | Age: 70
End: 2025-03-05
Payer: COMMERCIAL

## 2025-03-05 VITALS — BODY MASS INDEX: 31.82 KG/M2 | WEIGHT: 198 LBS | HEIGHT: 66 IN

## 2025-03-05 DIAGNOSIS — M25.571 PAIN, JOINT, ANKLE AND FOOT, RIGHT: ICD-10-CM

## 2025-03-05 DIAGNOSIS — M76.821 TIBIALIS POSTERIOR TENDINITIS, RIGHT: Primary | ICD-10-CM

## 2025-03-05 PROCEDURE — 99213 OFFICE O/P EST LOW 20 MIN: CPT | Performed by: ORTHOPAEDIC SURGERY

## 2025-03-05 RX ORDER — AMLODIPINE BESYLATE 2.5 MG/1
2.5 TABLET ORAL DAILY
COMMUNITY

## 2025-03-05 NOTE — PROGRESS NOTES
Assessment/Plan:  1. Tibialis posterior tendinitis, right        2. Pain, joint, ankle and foot, left            Elaine has increased discomfort in the right ankle and continues to have severe discomfort and swelling along the posterior tibialis tendon and is not respond to conservative measures.  Further imaging with MRI is warranted at this time.  MRI was ordered she will follow-up in the office after MRI is complete.  We could have her see podiatry if clinically indicated in the future.      Subjective:   Elaine Bolaños is a 69 y.o. female who presents to the office for follow-up for right-sided leg pain.  She was last in the office in October of this year with medial sided right ankle pain and calf pain that presented as posterior tibialis tendinitis.  She has a history of flatfoot and arch inserts used in the past.  At last office visit I recommended home exercises as she did not want to proceed with formal PT.  She states this has not significant helped.  She continues to have sharp stabbing pain to the medial aspect of the foot rating into the plantar surface.      Review of Systems   Constitutional:  Negative for chills, fever and unexpected weight change.   HENT:  Negative for hearing loss, nosebleeds and sore throat.    Eyes:  Negative for pain, redness and visual disturbance.   Respiratory:  Negative for cough, shortness of breath and wheezing.    Cardiovascular:  Negative for chest pain, palpitations and leg swelling.   Gastrointestinal:  Negative for abdominal pain, nausea and vomiting.   Endocrine: Negative for polydipsia and polyuria.   Genitourinary:  Negative for dysuria and hematuria.   Musculoskeletal:         See HPI   Skin:  Negative for rash and wound.   Neurological:  Negative for dizziness, numbness and headaches.   Psychiatric/Behavioral:  Negative for decreased concentration and suicidal ideas. The patient is not nervous/anxious.          Past Medical History:   Diagnosis Date    BRCA1  negative     BRCA2 negative     Breast cancer (HCC)     right    History of chemotherapy        Past Surgical History:   Procedure Laterality Date    HYSTERECTOMY      MASTECTOMY Right     1995    MASTECTOMY Right     OOPHORECTOMY      TONSILLECTOMY      TUMOR REMOVAL      mult tumor removals from age of 14 on       Family History   Problem Relation Age of Onset    Brain cancer Mother     Lung cancer Father     Prostate cancer Father     Lung cancer Sister     Ovarian cancer Sister     No Known Problems Maternal Grandmother     No Known Problems Maternal Grandfather     Breast cancer Paternal Grandmother         unkown age    No Known Problems Paternal Grandfather     No Known Problems Brother     No Known Problems Maternal Aunt     No Known Problems Maternal Uncle     No Known Problems Paternal Aunt     No Known Problems Paternal Uncle     ADD / ADHD Neg Hx     Anesthesia problems Neg Hx     Cancer Neg Hx     Clotting disorder Neg Hx     Collagen disease Neg Hx     Diabetes Neg Hx     Dislocations Neg Hx     Learning disabilities Neg Hx     Neurological problems Neg Hx     Osteoporosis Neg Hx     Rheumatologic disease Neg Hx     Scoliosis Neg Hx     Vascular Disease Neg Hx        Social History     Occupational History    Not on file   Tobacco Use    Smoking status: Never    Smokeless tobacco: Never   Vaping Use    Vaping status: Never Used   Substance and Sexual Activity    Alcohol use: No    Drug use: No    Sexual activity: Yes     Partners: Male     Comment: rare         Current Outpatient Medications:     acetaminophen (TYLENOL) 325 mg tablet, Take 2 tablets (650 mg total) by mouth every 6 (six) hours as needed for mild pain, Disp: 100 tablet, Rfl: 5    amLODIPine (NORVASC) 2.5 mg tablet, Take 2.5 mg by mouth daily, Disp: , Rfl:     Apple Cider Vinegar 500 MG TABS, Take 500 mg by mouth 2 (two) times a day, Disp: , Rfl:     Ascorbic Acid (VITAMIN C) 500 MG CAPS, Take by mouth, Disp: , Rfl:     Cholecalciferol  "(Vitamin D3) 125 MCG (5000 UT) TABS, Take 5,000 Units by mouth daily, Disp: , Rfl:     Coenzyme Q10 (COQ-10) 10 MG CAPS, Take by mouth, Disp: , Rfl:     Cyanocobalamin (VITAMIN B 12 PO), Take by mouth, Disp: , Rfl:     diazepam (VALIUM) 5 mg tablet, Take 1 tablet (5 mg total) by mouth every 6 (six) hours as needed for anxiety, Disp: 30 tablet, Rfl: 5    EPINEPHrine (EPIPEN) 0.3 mg/0.3 mL SOAJ, Inject 0.3 mL (0.3 mg total) into a muscle as needed for anaphylaxis (Patient not taking: Reported on 9/10/2024), Disp: 1 mL, Rfl: 5    Flax OIL, Take by mouth, Disp: , Rfl:     Iron Combinations (IRON COMPLEX PO), Take by mouth, Disp: , Rfl:     lactobacillus acidophilus, Take by mouth, Disp: , Rfl:     levothyroxine 50 mcg tablet, Take 50 mcg by mouth every morning, Disp: , Rfl:     losartan (COZAAR) 50 mg tablet, Take 1 tablet (50 mg total) by mouth daily, Disp: 90 tablet, Rfl: 1    Magnesium Oxide 140 MG CAPS, Take by mouth, Disp: , Rfl:     patient supplied medication, Take 2 tablets by mouth daily in the early morning Super Beets nitric oxide , Disp: , Rfl:     patient supplied medication, neprinol, Disp: , Rfl:     pyridoxine (VITAMIN B6) 100 mg tablet, Take by mouth, Disp: , Rfl:     selenium 200 mcg, Take by mouth, Disp: , Rfl:     sertraline (ZOLOFT) 50 mg tablet, Take 50 mg by mouth daily, Disp: , Rfl:     SUPER B COMPLEX/C PO, Take by mouth, Disp: , Rfl:     Turmeric 500 MG CAPS, Take by mouth, Disp: , Rfl:     Vitamin E 400 units TABS, Take by mouth, Disp: , Rfl:     Allergies   Allergen Reactions    Shellfish-Derived Products - Food Allergy Anaphylaxis    Augmentin [Amoxicillin-Pot Clavulanate]     Codeine Other (See Comments)     \" I can't function \"    Hydrocodone-Acetaminophen Vomiting    Levofloxacin Nausea Only and Vomiting       Objective:  There were no vitals filed for this visit.         Right Ankle Exam     Tenderness   Right ankle tenderness location: Medial sided ankle pain along posterior tibialis " tendon.  Swelling: mild    Range of Motion   Dorsiflexion:  normal   Plantar flexion:  normal   Eversion:  normal   Inversion:  normal     Muscle Strength   Dorsiflexion:  5/5  Plantar flexion:  5/5  Anterior tibial:  5/5  Posterior tibial:  4/5  Gastrocsoleus:  5/5    Other   Erythema: absent  Sensation: normal  Pulse: present     Comments:  Significant pes planus deformity          Strength/Myotome Testing     Right Ankle/Foot   Dorsiflexion: 5  Plantar flexion: 5      Physical Exam  Vitals and nursing note reviewed.   Constitutional:       Appearance: Normal appearance. She is well-developed.   HENT:      Head: Normocephalic and atraumatic.      Right Ear: External ear normal.      Left Ear: External ear normal.   Eyes:      General: No scleral icterus.     Extraocular Movements: Extraocular movements intact.      Conjunctiva/sclera: Conjunctivae normal.   Cardiovascular:      Rate and Rhythm: Normal rate.   Pulmonary:      Effort: Pulmonary effort is normal. No respiratory distress.   Musculoskeletal:      Cervical back: Normal range of motion and neck supple.      Comments: See Ortho exam   Skin:     General: Skin is warm and dry.   Neurological:      General: No focal deficit present.      Mental Status: She is alert and oriented to person, place, and time.   Psychiatric:         Behavior: Behavior normal.               This document was created using speech voice recognition software.   Grammatical errors, random word insertions, pronoun errors, and incomplete sentences are an occasional consequence of this system due to software limitations, ambient noise, and hardware issues.   Any formal questions or concerns about content, text, or information contained within the body of this dictation should be directly addressed to the provider for clarification.

## 2025-03-20 ENCOUNTER — HOSPITAL ENCOUNTER (OUTPATIENT)
Dept: RADIOLOGY | Facility: HOSPITAL | Age: 70
Discharge: HOME | End: 2025-03-20
Attending: ORTHOPAEDIC SURGERY
Payer: COMMERCIAL

## 2025-03-20 DIAGNOSIS — M25.571 PAIN, JOINT, ANKLE AND FOOT, RIGHT: ICD-10-CM

## 2025-03-20 DIAGNOSIS — M76.821 TIBIALIS POSTERIOR TENDINITIS, RIGHT: ICD-10-CM

## 2025-03-20 PROCEDURE — 73721 MRI JNT OF LWR EXTRE W/O DYE: CPT

## 2025-03-26 ENCOUNTER — OFFICE VISIT (OUTPATIENT)
Dept: OBGYN CLINIC | Facility: CLINIC | Age: 70
End: 2025-03-26
Payer: COMMERCIAL

## 2025-03-26 VITALS — HEIGHT: 66 IN | WEIGHT: 198 LBS | BODY MASS INDEX: 31.82 KG/M2

## 2025-03-26 DIAGNOSIS — M25.571 PAIN, JOINT, ANKLE AND FOOT, RIGHT: ICD-10-CM

## 2025-03-26 DIAGNOSIS — S86.301A PERONEAL TENDON INJURY, RIGHT, INITIAL ENCOUNTER: Primary | ICD-10-CM
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
## 2025-03-26 PROCEDURE — 99213 OFFICE O/P EST LOW 20 MIN: CPT | Performed by: ORTHOPAEDIC SURGERY

## 2025-03-26 NOTE — PROGRESS NOTES
Assessment/Plan:  1. Peroneal tendon injury, right, initial encounter        2. Repetitive stress injury        3. Pain, joint, ankle and foot, right            Elaine has right-sided foot pain and an MRI demonstrating a calcaneal stress injury as well as a peroneal split tear laterally.  I recommended a short course of immobilization in a cam walker boot for the next 4 to 6 weeks.  I did give her the option of seeing a foot and ankle specialist to consider surgery however I do think a lot of her pain may be bony in origin.  She will follow-up in 4 to 6 weeks for repeat evaluation.    Subjective:   Elaine Bolaños is a 69 y.o. female who presents to the office for follow-up for right-sided foot and ankle pain.  She has been feeling ongoing discomfort in the right ankle since October of last year with pain of the calf into the medial and lateral aspect of the ankle.  She had previously undergone physical therapy in the past.  When pain persisted we sent her for an MRI and she returns for results today.  She was doing a lot of walking on the treadmill but now feels like the pain is persistent even with stopping the treadmill.  She does have some swelling of the foot and ankle at night      Review of Systems   Constitutional:  Negative for chills, fever and unexpected weight change.   HENT:  Negative for hearing loss, nosebleeds and sore throat.    Eyes:  Negative for pain, redness and visual disturbance.   Respiratory:  Negative for cough, shortness of breath and wheezing.    Cardiovascular:  Negative for chest pain, palpitations and leg swelling.   Gastrointestinal:  Negative for abdominal pain, nausea and vomiting.   Endocrine: Negative for polydipsia and polyuria.   Genitourinary:  Negative for dysuria and hematuria.   Musculoskeletal:         See HPI   Skin:  Negative for rash and wound.   Neurological:  Negative for dizziness, numbness and headaches.   Psychiatric/Behavioral:  Negative for decreased concentration  and suicidal ideas. The patient is not nervous/anxious.          Past Medical History:   Diagnosis Date    BRCA1 negative     BRCA2 negative     Breast cancer (HCC)     right    History of chemotherapy        Past Surgical History:   Procedure Laterality Date    HYSTERECTOMY      MASTECTOMY Right     1995    MASTECTOMY Right     OOPHORECTOMY      TONSILLECTOMY      TUMOR REMOVAL      mult tumor removals from age of 14 on       Family History   Problem Relation Age of Onset    Brain cancer Mother     Lung cancer Father     Prostate cancer Father     Lung cancer Sister     Ovarian cancer Sister     No Known Problems Maternal Grandmother     No Known Problems Maternal Grandfather     Breast cancer Paternal Grandmother         unkown age    No Known Problems Paternal Grandfather     No Known Problems Brother     No Known Problems Maternal Aunt     No Known Problems Maternal Uncle     No Known Problems Paternal Aunt     No Known Problems Paternal Uncle     ADD / ADHD Neg Hx     Anesthesia problems Neg Hx     Cancer Neg Hx     Clotting disorder Neg Hx     Collagen disease Neg Hx     Diabetes Neg Hx     Dislocations Neg Hx     Learning disabilities Neg Hx     Neurological problems Neg Hx     Osteoporosis Neg Hx     Rheumatologic disease Neg Hx     Scoliosis Neg Hx     Vascular Disease Neg Hx        Social History     Occupational History    Not on file   Tobacco Use    Smoking status: Never    Smokeless tobacco: Never   Vaping Use    Vaping status: Never Used   Substance and Sexual Activity    Alcohol use: No    Drug use: No    Sexual activity: Yes     Partners: Male     Comment: rare         Current Outpatient Medications:     acetaminophen (TYLENOL) 325 mg tablet, Take 2 tablets (650 mg total) by mouth every 6 (six) hours as needed for mild pain, Disp: 100 tablet, Rfl: 5    amLODIPine (NORVASC) 2.5 mg tablet, Take 2.5 mg by mouth daily, Disp: , Rfl:     Apple Cider Vinegar 500 MG TABS, Take 500 mg by mouth 2 (two) times  "a day, Disp: , Rfl:     Ascorbic Acid (VITAMIN C) 500 MG CAPS, Take by mouth, Disp: , Rfl:     Cholecalciferol (Vitamin D3) 125 MCG (5000 UT) TABS, Take 5,000 Units by mouth daily, Disp: , Rfl:     Coenzyme Q10 (COQ-10) 10 MG CAPS, Take by mouth, Disp: , Rfl:     Cyanocobalamin (VITAMIN B 12 PO), Take by mouth, Disp: , Rfl:     diazepam (VALIUM) 5 mg tablet, Take 1 tablet (5 mg total) by mouth every 6 (six) hours as needed for anxiety, Disp: 30 tablet, Rfl: 5    EPINEPHrine (EPIPEN) 0.3 mg/0.3 mL SOAJ, Inject 0.3 mL (0.3 mg total) into a muscle as needed for anaphylaxis (Patient not taking: Reported on 9/10/2024), Disp: 1 mL, Rfl: 5    Flax OIL, Take by mouth, Disp: , Rfl:     Iron Combinations (IRON COMPLEX PO), Take by mouth, Disp: , Rfl:     lactobacillus acidophilus, Take by mouth, Disp: , Rfl:     levothyroxine 50 mcg tablet, Take 50 mcg by mouth every morning, Disp: , Rfl:     losartan (COZAAR) 50 mg tablet, Take 1 tablet (50 mg total) by mouth daily, Disp: 90 tablet, Rfl: 1    Magnesium Oxide 140 MG CAPS, Take by mouth, Disp: , Rfl:     patient supplied medication, Take 2 tablets by mouth daily in the early morning Super Beets nitric oxide , Disp: , Rfl:     patient supplied medication, neprinol, Disp: , Rfl:     pyridoxine (VITAMIN B6) 100 mg tablet, Take by mouth, Disp: , Rfl:     selenium 200 mcg, Take by mouth, Disp: , Rfl:     sertraline (ZOLOFT) 50 mg tablet, Take 50 mg by mouth daily, Disp: , Rfl:     SUPER B COMPLEX/C PO, Take by mouth, Disp: , Rfl:     Turmeric 500 MG CAPS, Take by mouth, Disp: , Rfl:     Vitamin E 400 units TABS, Take by mouth, Disp: , Rfl:     Allergies   Allergen Reactions    Shellfish-Derived Products - Food Allergy Anaphylaxis    Augmentin [Amoxicillin-Pot Clavulanate]     Codeine Other (See Comments)     \" I can't function \"    Hydrocodone-Acetaminophen Vomiting    Levofloxacin Nausea Only and Vomiting       Objective:  There were no vitals filed for this visit.         Right " Ankle Exam     Tenderness   Right ankle tenderness location: Peroneal tendon laterally.  Calcaneus and anterior talus.  Swelling: none    Range of Motion   Dorsiflexion:  normal   Plantar flexion:  normal   Eversion:  normal   Inversion:  normal     Other   Erythema: absent  Sensation: normal  Pulse: present             Physical Exam  Vitals and nursing note reviewed.   Constitutional:       Appearance: Normal appearance. She is well-developed.   HENT:      Head: Normocephalic and atraumatic.      Right Ear: External ear normal.      Left Ear: External ear normal.   Eyes:      General: No scleral icterus.     Extraocular Movements: Extraocular movements intact.      Conjunctiva/sclera: Conjunctivae normal.   Cardiovascular:      Rate and Rhythm: Normal rate.   Pulmonary:      Effort: Pulmonary effort is normal. No respiratory distress.   Musculoskeletal:      Cervical back: Normal range of motion and neck supple.      Comments: See Ortho exam   Skin:     General: Skin is warm and dry.   Neurological:      General: No focal deficit present.      Mental Status: She is alert and oriented to person, place, and time.   Psychiatric:         Behavior: Behavior normal.         I have personally reviewed pertinent films in PACS and my interpretation is as follows:  MRI of the right foot demonstrates a longitudinal peroneal split tear, increased patchy bony edema in the anterior calcaneus concerning for stress injury      This document was created using speech voice recognition software.   Grammatical errors, random word insertions, pronoun errors, and incomplete sentences are an occasional consequence of this system due to software limitations, ambient noise, and hardware issues.   Any formal questions or concerns about content, text, or information contained within the body of this dictation should be directly addressed to the provider for clarification.

## 2025-04-24 ENCOUNTER — OFFICE VISIT (OUTPATIENT)
Dept: CARDIOLOGY CLINIC | Facility: CLINIC | Age: 70
End: 2025-04-24
Payer: COMMERCIAL

## 2025-04-24 VITALS
SYSTOLIC BLOOD PRESSURE: 130 MMHG | HEIGHT: 66 IN | WEIGHT: 202 LBS | BODY MASS INDEX: 32.47 KG/M2 | HEART RATE: 69 BPM | OXYGEN SATURATION: 98 % | DIASTOLIC BLOOD PRESSURE: 80 MMHG

## 2025-04-24 DIAGNOSIS — I49.8 VENTRICULAR TRIGEMINY: ICD-10-CM

## 2025-04-24 DIAGNOSIS — I10 ESSENTIAL HYPERTENSION: ICD-10-CM

## 2025-04-24 DIAGNOSIS — I49.3 PVCS (PREMATURE VENTRICULAR CONTRACTIONS): Primary | ICD-10-CM

## 2025-04-24 DIAGNOSIS — E78.5 DYSLIPIDEMIA: ICD-10-CM

## 2025-04-24 PROCEDURE — 99214 OFFICE O/P EST MOD 30 MIN: CPT | Performed by: INTERNAL MEDICINE

## 2025-04-24 PROCEDURE — 93000 ELECTROCARDIOGRAM COMPLETE: CPT | Performed by: INTERNAL MEDICINE

## 2025-04-24 NOTE — PROGRESS NOTES
Cardiology   Romel Sanchez DO, [facility]  Justin Gerard MD, [facility]  Allan Schneider MD, [facility]  Lesli Ortega MD, [facility]  -------------------------------------------------------------------  [facility]  [address], [address], [address]  [place], [place], [address]  [phone]    Cardiology Follow Up  Elaine Bolaños  1955  [id number]          Assessment/Plan:    1. PVCs (premature ventricular contractions)    2. Essential hypertension    3. Dyslipidemia    4. Ventricular trigeminy      -Patient with frequent PVCs noted on EKG.  Her previous Holter monitors have shown a 10% PVC burden but most recent Holter monitor showed 2% PVC burden.  Metoprolol was recommended afterwards but she did not wish to take it.  Blood pressure is controlled with amlodipine and losartan.  Palpitations have been manageable.  -If palpitations increase in frequency or if any symptoms of near syncope or shortness of breath, she should call and can consider switching medication at that time.  -Continue losartan and amlodipine  -Continue regular exercise.  -Follow-up in April 2026    Interval History:     Elaine Bolaños is 69 y.o. female here for followup of hypertension and PVCs.  She was complaining of increased palpitations and had PVCs on last ECG in office.  Holter monitor and echocardiogram were ordered.  Holter monitor showed 2% PVC burden but she did have symptoms during some of the PVCs.  Echocardiogram was normal.  Afterwards, metoprolol was recommended but she did not wish to take it.  She is using losartan and amlodipine and was also started on antianxiety medication by her primary medical doctor.  Palpitations have improved since then.    She has intermittent palpitations but overall is feeling well.  She has no history of syncope or near syncope.  Blood pressure controlled with current regimen.  She is very active and does not have any symptoms with exertion. Shg hurt her right  foot and cannot walk.          The following portions of the patient's history were reviewed and updated as appropriate: allergies, current medications, past family history, past medical history, past social history, past surgical history, and problem list.       Current Outpatient Medications:     acetaminophen (TYLENOL) 325 mg tablet, Take 2 tablets (650 mg total) by mouth every 6 (six) hours as needed for mild pain, Disp: 100 tablet, Rfl: 5    amLODIPine (NORVASC) 2.5 mg tablet, Take 2.5 mg by mouth daily, Disp: , Rfl:     Apple Cider Vinegar 500 MG TABS, Take 500 mg by mouth 2 (two) times a day, Disp: , Rfl:     Ascorbic Acid (VITAMIN C) 500 MG CAPS, Take by mouth, Disp: , Rfl:     Cholecalciferol (Vitamin D3) 125 MCG (5000 UT) TABS, Take 5,000 Units by mouth daily, Disp: , Rfl:     Coenzyme Q10 (COQ-10) 10 MG CAPS, Take by mouth, Disp: , Rfl:     Cyanocobalamin (VITAMIN B 12 PO), Take by mouth, Disp: , Rfl:     diazepam (VALIUM) 5 mg tablet, Take 1 tablet (5 mg total) by mouth every 6 (six) hours as needed for anxiety, Disp: 30 tablet, Rfl: 5    Flax OIL, Take by mouth, Disp: , Rfl:     Iron Combinations (IRON COMPLEX PO), Take by mouth, Disp: , Rfl:     lactobacillus acidophilus, Take by mouth, Disp: , Rfl:     levothyroxine 50 mcg tablet, Take 50 mcg by mouth every morning, Disp: , Rfl:     losartan (COZAAR) 50 mg tablet, Take 1 tablet (50 mg total) by mouth daily, Disp: 90 tablet, Rfl: 1    Magnesium Oxide 140 MG CAPS, Take by mouth, Disp: , Rfl:     patient supplied medication, Take 2 tablets by mouth daily in the early morning Super Beets nitric oxide , Disp: , Rfl:     patient supplied medication, neprinol, Disp: , Rfl:     pyridoxine (VITAMIN B6) 100 mg tablet, Take by mouth, Disp: , Rfl:     selenium 200 mcg, Take by mouth, Disp: , Rfl:     SUPER B COMPLEX/C PO, Take by mouth, Disp: , Rfl:     Turmeric 500 MG CAPS, Take by mouth, Disp: , Rfl:     Vitamin E 400 units TABS, Take by mouth, Disp: , Rfl:      "EPINEPHrine (EPIPEN) 0.3 mg/0.3 mL SOAJ, Inject 0.3 mL (0.3 mg total) into a muscle as needed for anaphylaxis (Patient not taking: Reported on 9/10/2024), Disp: 1 mL, Rfl: 5    sertraline (ZOLOFT) 50 mg tablet, Take 50 mg by mouth daily, Disp: , Rfl:         Review of Systems:  Review of Systems   Respiratory:  Negative for shortness of breath.    Cardiovascular:  Positive for palpitations. Negative for chest pain and leg swelling.   All other systems reviewed and are negative.        Physical Exam:  Vitals:  Vitals:    04/24/25 1426   BP: 130/80   BP Location: Left arm   Patient Position: Sitting   Cuff Size: Large   Pulse: 69   SpO2: 98%   Weight: 91.6 kg (202 lb)   Height: 5' 6\" (1.676 m)     Physical Exam   Constitutional: She appears healthy. No distress.   Eyes: Pupils are equal, round, and reactive to light. Conjunctivae are normal.   Neck: No JVD present.   Cardiovascular: Normal rate, regular rhythm and normal heart sounds. Exam reveals no gallop and no friction rub.   No murmur heard.  Pulmonary/Chest: Effort normal and breath sounds normal. She has no wheezes. She has no rales.   Musculoskeletal:         General: No tenderness, deformity or edema.      Cervical back: Normal range of motion and neck supple.   Neurological: She is alert and oriented to person, place, and time.   Skin: Skin is warm and dry.        Cardiographics:  See above  Last known EF: 55%    This note was completed in part utilizing M-Modal Fluency Direct Software.  Grammatical errors, random word insertions, spelling mistakes, and incomplete sentences can be an occasional consequence of this system secondary to software limitations, ambient noise, and hardware issues.  If you have any questions or concerns about the content, text, or information contained within the body of this dictation, please contact the provider for clarification.      "

## 2025-04-30 ENCOUNTER — OFFICE VISIT (OUTPATIENT)
Dept: OBGYN CLINIC | Facility: CLINIC | Age: 70
End: 2025-04-30
Payer: COMMERCIAL

## 2025-04-30 VITALS — HEIGHT: 66 IN | BODY MASS INDEX: 32.47 KG/M2 | WEIGHT: 202 LBS
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
## 2025-04-30 DIAGNOSIS — S86.301A PERONEAL TENDON INJURY, RIGHT, INITIAL ENCOUNTER: Primary | ICD-10-CM

## 2025-04-30 PROCEDURE — 99213 OFFICE O/P EST LOW 20 MIN: CPT | Performed by: ORTHOPAEDIC SURGERY

## 2025-04-30 NOTE — PROGRESS NOTES
Patient Name: Elaine Bolaños      : 1955       MRN: [id number]   Encounter Provider: Edward Riley DO   Encounter Date: 25  Encounter department: [facility]         Assessment & Plan  Peroneal tendon injury, right, initial encounter  Elaine is doing well today and has improvement in her medial sided ankle pain and no longer has any pain on palpation of the midfoot or calcaneus.  It appears the stress injury has significantly improved.  Most of her pain today seems lateral and along the peroneal split tear.  I recommended weaning from the cam walker boot at this time and beginning formal physical therapy.  A prescription was provided for her today for PT.  Follow-up in 6 weeks for repeat evaluation.  we could consider surgical consultation for the peroneal split tear if pain persist.  Orders:    Ambulatory referral to Physical Therapy; Future    Repetitive stress injury             Follow-up:  Return in about 6 weeks (around 2025) for after trial of physical therapy, Recheck.     _____________________________________________________  CHIEF COMPLAINT:  Chief Complaint   Patient presents with    Right Ankle - Follow-up         SUBJECTIVE:  Elaine Bolaños is a 69 y.o. female who presents to the office for follow-up for a right ankle injury.She was last in the office 5 weeks ago with right-sided ankle pain beginning in October of last year.  She had an MRI demonstrating a stress reaction in the calcaneus as well as peroneal split tear.  She was in a cam walker boot for the last 5 weeks and reports significant improvement following the use of the boot.  She feels no pain in the medial aspect of the foot any longer and no pain in the calcaneus region.  She still has lateral ankle pain along the peroneal tendon location.  She is nervous to walk without the boot.    PAST MEDICAL HISTORY:  Past Medical History:   Diagnosis Date    BRCA1 negative     BRCA2 negative      Breast cancer (HCC)     right    History of chemotherapy        PAST SURGICAL HISTORY:  Past Surgical History:   Procedure Laterality Date    HYSTERECTOMY      MASTECTOMY Right     1995    MASTECTOMY Right     OOPHORECTOMY      TONSILLECTOMY      TUMOR REMOVAL      mult tumor removals from age of 14 on       FAMILY HISTORY:  Family History   Problem Relation Age of Onset    Brain cancer Mother     Lung cancer Father     Prostate cancer Father     Lung cancer Sister     Ovarian cancer Sister     No Known Problems Maternal Grandmother     No Known Problems Maternal Grandfather     Breast cancer Paternal Grandmother         unkown age    No Known Problems Paternal Grandfather     No Known Problems Brother     No Known Problems Maternal Aunt     No Known Problems Maternal Uncle     No Known Problems Paternal Aunt     No Known Problems Paternal Uncle     ADD / ADHD Neg Hx     Anesthesia problems Neg Hx     Cancer Neg Hx     Clotting disorder Neg Hx     Collagen disease Neg Hx     Diabetes Neg Hx     Dislocations Neg Hx     Learning disabilities Neg Hx     Neurological problems Neg Hx     Osteoporosis Neg Hx     Rheumatologic disease Neg Hx     Scoliosis Neg Hx     Vascular Disease Neg Hx        SOCIAL HISTORY:  Social History     Tobacco Use    Smoking status: Never    Smokeless tobacco: Never   Vaping Use    Vaping status: Never Used   Substance Use Topics    Alcohol use: No    Drug use: No       MEDICATIONS:    Current Outpatient Medications:     acetaminophen (TYLENOL) 325 mg tablet, Take 2 tablets (650 mg total) by mouth every 6 (six) hours as needed for mild pain, Disp: 100 tablet, Rfl: 5    amLODIPine (NORVASC) 2.5 mg tablet, Take 2.5 mg by mouth daily, Disp: , Rfl:     Apple Cider Vinegar 500 MG TABS, Take 500 mg by mouth 2 (two) times a day, Disp: , Rfl:     Ascorbic Acid (VITAMIN C) 500 MG CAPS, Take by mouth, Disp: , Rfl:     Cholecalciferol (Vitamin D3) 125 MCG (5000 UT) TABS, Take 5,000 Units by mouth daily,  "Disp: , Rfl:     Coenzyme Q10 (COQ-10) 10 MG CAPS, Take by mouth, Disp: , Rfl:     Cyanocobalamin (VITAMIN B 12 PO), Take by mouth, Disp: , Rfl:     diazepam (VALIUM) 5 mg tablet, Take 1 tablet (5 mg total) by mouth every 6 (six) hours as needed for anxiety, Disp: 30 tablet, Rfl: 5    EPINEPHrine (EPIPEN) 0.3 mg/0.3 mL SOAJ, Inject 0.3 mL (0.3 mg total) into a muscle as needed for anaphylaxis (Patient not taking: Reported on 9/10/2024), Disp: 1 mL, Rfl: 5    Flax OIL, Take by mouth, Disp: , Rfl:     Iron Combinations (IRON COMPLEX PO), Take by mouth, Disp: , Rfl:     lactobacillus acidophilus, Take by mouth, Disp: , Rfl:     levothyroxine 50 mcg tablet, Take 50 mcg by mouth every morning, Disp: , Rfl:     losartan (COZAAR) 50 mg tablet, Take 1 tablet (50 mg total) by mouth daily, Disp: 90 tablet, Rfl: 1    Magnesium Oxide 140 MG CAPS, Take by mouth, Disp: , Rfl:     patient supplied medication, Take 2 tablets by mouth daily in the early morning Super Beets nitric oxide , Disp: , Rfl:     patient supplied medication, neprinol, Disp: , Rfl:     pyridoxine (VITAMIN B6) 100 mg tablet, Take by mouth, Disp: , Rfl:     selenium 200 mcg, Take by mouth, Disp: , Rfl:     sertraline (ZOLOFT) 50 mg tablet, Take 50 mg by mouth daily, Disp: , Rfl:     SUPER B COMPLEX/C PO, Take by mouth, Disp: , Rfl:     Turmeric 500 MG CAPS, Take by mouth, Disp: , Rfl:     Vitamin E 400 units TABS, Take by mouth, Disp: , Rfl:     ALLERGIES:  Allergies   Allergen Reactions    Shellfish-Derived Products - Food Allergy Anaphylaxis    Augmentin [Amoxicillin-Pot Clavulanate]     Codeine Other (See Comments)     \" I can't function \"    Hydrocodone-Acetaminophen Vomiting    Levofloxacin Nausea Only and Vomiting         _____________________________________________________  Review of Systems     Right Ankle Exam     Tenderness   Right ankle tenderness location: Peroneal tendon just posterior to lateral malleolus.  Swelling: mild    Range of Motion "   Dorsiflexion:  normal   Plantar flexion:  normal   Eversion:  normal   Inversion:  normal     Muscle Strength   Dorsiflexion:  5/5  Plantar flexion:  5/5  Anterior tibial:  5/5  Posterior tibial:  5/5  Gastrocsoleus:  5/5  Peroneal muscle:  4/5    Other   Erythema: absent  Sensation: normal  Pulse: present              Physical Exam  Vitals and nursing note reviewed.   Constitutional:       Appearance: Normal appearance. She is well-developed.   HENT:      Head: Normocephalic and atraumatic.      Right Ear: External ear normal.      Left Ear: External ear normal.   Eyes:      General: No scleral icterus.     Extraocular Movements: Extraocular movements intact.      Conjunctiva/sclera: Conjunctivae normal.   Cardiovascular:      Rate and Rhythm: Normal rate.   Pulmonary:      Effort: Pulmonary effort is normal. No respiratory distress.   Musculoskeletal:      Cervical back: Normal range of motion and neck supple.      Comments: See Ortho exam   Skin:     General: Skin is warm and dry.   Neurological:      General: No focal deficit present.      Mental Status: She is alert and oriented to person, place, and time.   Psychiatric:         Behavior: Behavior normal.        _____________________________________________________  STUDIES REVIEWED:  I personally reviewed the pertinent images and my independent interpretation is as follows:      PROCEDURES PERFORMED:  No Procedures performed today      This document was created using speech voice recognition software.   Grammatical errors, random word insertions, pronoun errors, and incomplete sentences are an occasional consequence of this system due to software limitations, ambient noise, and hardware issues.   Any formal questions or concerns about content, text, or information contained within the body of this dictation should be directly addressed to the provider for clarification.

## 2025-04-30 NOTE — ASSESSMENT & PLAN NOTE
Elaine is doing well today and has improvement in her medial sided ankle pain and no longer has any pain on palpation of the midfoot or calcaneus.  It appears the stress injury has significantly improved.  Most of her pain today seems lateral and along the peroneal split tear.  I recommended weaning from the cam walker boot at this time and beginning formal physical therapy.  A prescription was provided for her today for PT.  Follow-up in 6 weeks for repeat evaluation.  we could consider surgical consultation for the peroneal split tear if pain persist.  Orders:    Ambulatory referral to Physical Therapy; Future

## 2025-06-18 ENCOUNTER — OFFICE VISIT (OUTPATIENT)
Dept: OBGYN CLINIC | Facility: CLINIC | Age: 70
End: 2025-06-18
Payer: COMMERCIAL

## 2025-06-18 VITALS — HEIGHT: 66 IN | WEIGHT: 202 LBS | BODY MASS INDEX: 32.47 KG/M2

## 2025-06-18 DIAGNOSIS — S86.301A PERONEAL TENDON INJURY, RIGHT, INITIAL ENCOUNTER: Primary | ICD-10-CM

## 2025-06-18 PROCEDURE — 99213 OFFICE O/P EST LOW 20 MIN: CPT | Performed by: ORTHOPAEDIC SURGERY

## 2025-06-18 NOTE — PROGRESS NOTES
"Patient Name: Elaine Bolaños      : 1955       MRN: [id number]   Encounter Provider: Edward Riley DO   Encounter Date: 25  Encounter department: [facility] [place]         Assessment & Plan  Peroneal tendon injury, right, initial encounter  Elaine has persistent pain in the right ankle however is not as severe as she was last office visit.  She has no reproducible pain over the peroneal tendon and appropriate range of motion and strength.  I discussed continuing physical therapy or having a consultation with a foot surgeon to discuss tendon repair.  She did not declined any surgical referral at this time would like to continue with PT.  She will follow-up with me after therapy in the next 6 weeks if pain continues.    Orders:    Ambulatory referral to Physical Therapy; Future           Follow-up:  No follow-ups on file.     _____________________________________________________  CHIEF COMPLAINT:  Chief Complaint   Patient presents with    Right Ankle - Follow-up       Height 5' 6\" (1.676 m), weight 91.6 kg (202 lb).         SUBJECTIVE:  Elaine Bolaños is a 69 y.o. female who presents to the office for follow-up for peroneal split tear in the right ankle.  At last visit she had recovered from the stress injury and was in a cam walker boot followed by increased physical therapy for the last 6 weeks.  She reports improvement in her pain but did have a setback with a recent fall in the garden.  She continues to feel some pain in the ankle but not as severe.  She denies any swelling or range of motion issues.    PAST MEDICAL HISTORY:  Past Medical History[1]    PAST SURGICAL HISTORY:  Past Surgical History[2]    FAMILY HISTORY:  Family History[3]    SOCIAL HISTORY:  Social History[4]    MEDICATIONS:  Current Medications[5]    ALLERGIES:  Allergies[6]      _____________________________________________________  Review of Systems     Right Ankle Exam     Tenderness   Right ankle " tenderness location: Mild tenderness over the anterior right ankle and ATFL region.  No peroneal tendon tenderness.  Swelling: none    Range of Motion   Dorsiflexion:  normal   Plantar flexion:  normal   Eversion:  normal   Inversion:  normal     Muscle Strength   Dorsiflexion:  5/5  Plantar flexion:  5/5  Anterior tibial:  5/5  Posterior tibial:  5/5  Gastrocsoleus:  5/5  Peroneal muscle:  5/5    Other   Erythema: absent  Sensation: normal  Pulse: present              Physical Exam  Vitals and nursing note reviewed.   Constitutional:       Appearance: Normal appearance. She is well-developed.   HENT:      Head: Normocephalic and atraumatic.      Right Ear: External ear normal.      Left Ear: External ear normal.     Eyes:      General: No scleral icterus.     Extraocular Movements: Extraocular movements intact.      Conjunctiva/sclera: Conjunctivae normal.       Cardiovascular:      Rate and Rhythm: Normal rate.   Pulmonary:      Effort: Pulmonary effort is normal. No respiratory distress.     Musculoskeletal:      Cervical back: Normal range of motion and neck supple.      Comments: See Ortho exam     Skin:     General: Skin is warm and dry.     Neurological:      General: No focal deficit present.      Mental Status: She is alert and oriented to person, place, and time.     Psychiatric:         Behavior: Behavior normal.        _____________________________________________________  STUDIES REVIEWED:  I personally reviewed the pertinent images and my independent interpretation is as follows:    None today  PROCEDURES PERFORMED:  Procedures        This document was created using speech voice recognition software.   Grammatical errors, random word insertions, pronoun errors, and incomplete sentences are an occasional consequence of this system due to software limitations, ambient noise, and hardware issues.   Any formal questions or concerns about content, text, or information contained within the body of this  dictation should be directly addressed to the provider for clarification.         [1]   Past Medical History:  Diagnosis Date    BRCA1 negative     BRCA2 negative     Breast cancer (HCC)     right    History of chemotherapy    [2]   Past Surgical History:  Procedure Laterality Date    HYSTERECTOMY      MASTECTOMY Right     1995    MASTECTOMY Right     OOPHORECTOMY      TONSILLECTOMY      TUMOR REMOVAL      mult tumor removals from age of 14 on   [3]   Family History  Problem Relation Name Age of Onset    Brain cancer Mother      Lung cancer Father      Prostate cancer Father      Lung cancer Sister      Ovarian cancer Sister      No Known Problems Maternal Grandmother      No Known Problems Maternal Grandfather      Breast cancer Paternal Grandmother          unkown age    No Known Problems Paternal Grandfather      No Known Problems Brother      No Known Problems Maternal Aunt      No Known Problems Maternal Uncle      No Known Problems Paternal Aunt      No Known Problems Paternal Uncle      ADD / ADHD Neg Hx      Anesthesia problems Neg Hx      Cancer Neg Hx      Clotting disorder Neg Hx      Collagen disease Neg Hx      Diabetes Neg Hx      Dislocations Neg Hx      Learning disabilities Neg Hx      Neurological problems Neg Hx      Osteoporosis Neg Hx      Rheumatologic disease Neg Hx      Scoliosis Neg Hx      Vascular Disease Neg Hx     [4]   Social History  Tobacco Use    Smoking status: Never    Smokeless tobacco: Never   Vaping Use    Vaping status: Never Used   Substance Use Topics    Alcohol use: No    Drug use: No   [5]   Current Outpatient Medications:     acetaminophen (TYLENOL) 325 mg tablet, Take 2 tablets (650 mg total) by mouth every 6 (six) hours as needed for mild pain, Disp: 100 tablet, Rfl: 5    amLODIPine (NORVASC) 2.5 mg tablet, Take 2.5 mg by mouth daily, Disp: , Rfl:     Apple Cider Vinegar 500 MG TABS, Take 500 mg by mouth 2 (two) times a day, Disp: , Rfl:     Ascorbic Acid (VITAMIN C) 500  "MG CAPS, Take by mouth, Disp: , Rfl:     Cholecalciferol (Vitamin D3) 125 MCG (5000 UT) TABS, Take 5,000 Units by mouth daily, Disp: , Rfl:     Coenzyme Q10 (COQ-10) 10 MG CAPS, Take by mouth, Disp: , Rfl:     Cyanocobalamin (VITAMIN B 12 PO), Take by mouth, Disp: , Rfl:     diazepam (VALIUM) 5 mg tablet, Take 1 tablet (5 mg total) by mouth every 6 (six) hours as needed for anxiety, Disp: 30 tablet, Rfl: 5    EPINEPHrine (EPIPEN) 0.3 mg/0.3 mL SOAJ, Inject 0.3 mL (0.3 mg total) into a muscle as needed for anaphylaxis (Patient not taking: Reported on 9/10/2024), Disp: 1 mL, Rfl: 5    Flax OIL, Take by mouth, Disp: , Rfl:     Iron Combinations (IRON COMPLEX PO), Take by mouth, Disp: , Rfl:     lactobacillus acidophilus, Take by mouth, Disp: , Rfl:     levothyroxine 50 mcg tablet, Take 50 mcg by mouth every morning, Disp: , Rfl:     losartan (COZAAR) 50 mg tablet, Take 1 tablet (50 mg total) by mouth daily, Disp: 90 tablet, Rfl: 1    Magnesium Oxide 140 MG CAPS, Take by mouth, Disp: , Rfl:     patient supplied medication, Take 2 tablets by mouth daily in the early morning Super Beets nitric oxide , Disp: , Rfl:     patient supplied medication, neprinol, Disp: , Rfl:     pyridoxine (VITAMIN B6) 100 mg tablet, Take by mouth, Disp: , Rfl:     selenium 200 mcg, Take by mouth, Disp: , Rfl:     sertraline (ZOLOFT) 50 mg tablet, Take 50 mg by mouth daily, Disp: , Rfl:     SUPER B COMPLEX/C PO, Take by mouth, Disp: , Rfl:     Turmeric 500 MG CAPS, Take by mouth, Disp: , Rfl:     Vitamin E 400 units TABS, Take by mouth, Disp: , Rfl:   [6]   Allergies  Allergen Reactions    Shellfish-Derived Products - Food Allergy Anaphylaxis    Augmentin [Amoxicillin-Pot Clavulanate]     Codeine Other (See Comments)     \" I can't function \"    Hydrocodone-Acetaminophen Vomiting    Levofloxacin Nausea Only and Vomiting     "

## 2025-06-18 NOTE — ASSESSMENT & PLAN NOTE
Elaine has persistent pain in the right ankle however is not as severe as she was last office visit.  She has no reproducible pain over the peroneal tendon and appropriate range of motion and strength.  I discussed continuing physical therapy or having a consultation with a foot surgeon to discuss tendon repair.  She did not declined any surgical referral at this time would like to continue with PT.  She will follow-up with me after therapy in the next 6 weeks if pain continues.    Orders:    Ambulatory referral to Physical Therapy; Future